# Patient Record
Sex: MALE | Race: BLACK OR AFRICAN AMERICAN | NOT HISPANIC OR LATINO | Employment: FULL TIME | URBAN - METROPOLITAN AREA
[De-identification: names, ages, dates, MRNs, and addresses within clinical notes are randomized per-mention and may not be internally consistent; named-entity substitution may affect disease eponyms.]

---

## 2017-03-29 RX ORDER — GLYBURIDE 5 MG/1
TABLET ORAL
Qty: 180 TABLET | Refills: 0 | Status: SHIPPED | OUTPATIENT
Start: 2017-03-29 | End: 2017-08-23

## 2017-03-29 RX ORDER — METFORMIN HYDROCHLORIDE 1000 MG/1
TABLET ORAL
Qty: 180 TABLET | Refills: 0 | Status: SHIPPED | OUTPATIENT
Start: 2017-03-29 | End: 2017-10-13 | Stop reason: SDUPTHER

## 2017-03-29 NOTE — TELEPHONE ENCOUNTER
Please call pt. He is overdue for follow up visit and lab work for his diabetes. Labs are already ordered ([facility]) - please schedule labs and appt. Refills sent. Thanks!

## 2017-08-23 ENCOUNTER — OFFICE VISIT (OUTPATIENT)
Dept: FAMILY MEDICINE | Facility: CLINIC | Age: 62
End: 2017-08-23
Payer: MEDICARE

## 2017-08-23 ENCOUNTER — HOSPITAL ENCOUNTER (OUTPATIENT)
Dept: RADIOLOGY | Facility: CLINIC | Age: 62
Discharge: HOME | End: 2017-08-23
Attending: NURSE PRACTITIONER
Payer: MEDICARE

## 2017-08-23 VITALS
TEMPERATURE: 99 F | DIASTOLIC BLOOD PRESSURE: 60 MMHG | HEIGHT: 67 IN | SYSTOLIC BLOOD PRESSURE: 148 MMHG | WEIGHT: 248.88 LBS | HEART RATE: 75 BPM | BODY MASS INDEX: 39.06 KG/M2

## 2017-08-23 DIAGNOSIS — M25.511 BILATERAL SHOULDER PAIN, UNSPECIFIED CHRONICITY: Primary | ICD-10-CM

## 2017-08-23 DIAGNOSIS — M25.511 BILATERAL SHOULDER PAIN, UNSPECIFIED CHRONICITY: ICD-10-CM

## 2017-08-23 DIAGNOSIS — I10 UNCONTROLLED HYPERTENSION: ICD-10-CM

## 2017-08-23 DIAGNOSIS — G62.9 NEUROPATHY: ICD-10-CM

## 2017-08-23 DIAGNOSIS — M25.512 BILATERAL SHOULDER PAIN, UNSPECIFIED CHRONICITY: Primary | ICD-10-CM

## 2017-08-23 DIAGNOSIS — M25.512 BILATERAL SHOULDER PAIN, UNSPECIFIED CHRONICITY: ICD-10-CM

## 2017-08-23 PROCEDURE — 3078F DIAST BP <80 MM HG: CPT | Mod: ,,, | Performed by: NURSE PRACTITIONER

## 2017-08-23 PROCEDURE — 99204 OFFICE O/P NEW MOD 45 MIN: CPT | Mod: S$PBB,,, | Performed by: NURSE PRACTITIONER

## 2017-08-23 PROCEDURE — 73030 X-RAY EXAM OF SHOULDER: CPT | Mod: TC,50,PO

## 2017-08-23 PROCEDURE — 99999 PR PBB SHADOW E&M-EST. PATIENT-LVL V: CPT | Mod: PBBFAC,,, | Performed by: NURSE PRACTITIONER

## 2017-08-23 PROCEDURE — 3077F SYST BP >= 140 MM HG: CPT | Mod: ,,, | Performed by: NURSE PRACTITIONER

## 2017-08-23 PROCEDURE — 73030 X-RAY EXAM OF SHOULDER: CPT | Mod: 26,50,S$GLB, | Performed by: RADIOLOGY

## 2017-08-23 RX ORDER — LOVASTATIN 40 MG/1
40 TABLET ORAL NIGHTLY
COMMUNITY
End: 2018-11-14 | Stop reason: SDUPTHER

## 2017-08-23 RX ORDER — POTASSIUM CHLORIDE 750 MG/1
10 TABLET, EXTENDED RELEASE ORAL DAILY
COMMUNITY
End: 2017-09-07 | Stop reason: ALTCHOICE

## 2017-08-23 RX ORDER — NYSTATIN 100000 U/G
CREAM TOPICAL 2 TIMES DAILY PRN
COMMUNITY
End: 2019-01-16

## 2017-08-23 RX ORDER — AMITRIPTYLINE HYDROCHLORIDE 10 MG/1
10 TABLET, FILM COATED ORAL NIGHTLY PRN
COMMUNITY
End: 2019-02-22

## 2017-08-23 RX ORDER — GABAPENTIN 300 MG/1
300 CAPSULE ORAL 2 TIMES DAILY
COMMUNITY
End: 2017-08-23 | Stop reason: DRUGHIGH

## 2017-08-23 RX ORDER — DICLOFENAC SODIUM 10 MG/G
2 GEL TOPICAL 4 TIMES DAILY PRN
Qty: 100 G | Refills: 3 | Status: SHIPPED | OUTPATIENT
Start: 2017-08-23 | End: 2018-11-14

## 2017-08-23 RX ORDER — GABAPENTIN 100 MG/1
100 CAPSULE ORAL 3 TIMES DAILY
Qty: 90 CAPSULE | Refills: 11 | Status: SHIPPED | OUTPATIENT
Start: 2017-08-23 | End: 2019-02-22 | Stop reason: SDUPTHER

## 2017-08-23 RX ORDER — FLUCONAZOLE 150 MG/1
150 TABLET ORAL
COMMUNITY
End: 2019-01-16

## 2017-08-23 RX ORDER — HYDROCORTISONE 25 MG/G
CREAM TOPICAL 2 TIMES DAILY
COMMUNITY
End: 2018-11-14

## 2017-08-23 RX ORDER — FUROSEMIDE 40 MG/1
40 TABLET ORAL DAILY
COMMUNITY
End: 2017-09-07 | Stop reason: ALTCHOICE

## 2017-08-23 NOTE — PROGRESS NOTES
"Subjective:       Patient ID: Kilo Lam Sr. is a 61 y.o. male.    Chief Complaint: Shoulder Pain (right more then left)    Mr. Lam presents to the clinic today for complaint of bilateral shoulder pain which began one month ago.  Pain began in the right shoulder, and after favoring the left shoulder for weeks he began having left shoulder pain as well.  He has history of left shoulder surgery although he is unsure exactly what type.  He does have bilateral hand numbness and states this is chronic due to diabetic neuropathy.  He took gabapentin 300 mg but this caused too much drowsiness to he stopped.  Blood pressure is uncontrolled and he states he has had high blood pressure since age 15.  He states it is "always high".  Denies ever seeing a nephrologist.      Shoulder Pain    The pain is present in the left shoulder and right shoulder. This is a new problem. The current episode started 1 to 4 weeks ago. There has been no history of extremity trauma. The problem occurs daily. The problem has been gradually worsening. The pain is at a severity of 8/10. Associated symptoms include a limited range of motion and numbness (both hands and legs (states due to diabetic neuropathy)). Pertinent negatives include no fever, headaches, inability to bear weight, joint swelling, stiffness, tingling or visual symptoms. He has tried heat, cold and acetaminophen for the symptoms. The treatment provided no relief. His past medical history is significant for diabetes.     Review of Systems   Constitutional: Negative for fever.   Respiratory: Negative for cough and shortness of breath.    Cardiovascular: Negative for chest pain.   Musculoskeletal: Positive for arthralgias and neck pain. Negative for back pain, joint swelling, neck stiffness and stiffness.   Neurological: Positive for numbness (both hands and legs (states due to diabetic neuropathy)). Negative for tingling, weakness and headaches.       Objective:      Physical " Exam   Constitutional: He is oriented to person, place, and time. He appears well-developed and well-nourished. No distress.   HENT:   Head: Normocephalic and atraumatic.   Right Ear: External ear normal.   Left Ear: External ear normal.   Mouth/Throat: Oropharynx is clear and moist. No oropharyngeal exudate.   Eyes: Pupils are equal, round, and reactive to light. Right eye exhibits no discharge. Left eye exhibits no discharge.   Neck: Neck supple.   Cardiovascular: Normal rate.    Pulmonary/Chest: Effort normal.   Abdominal: Soft.   Musculoskeletal:        Right shoulder: He exhibits decreased range of motion and tenderness. He exhibits no bony tenderness, no swelling, no crepitus and normal strength.        Left shoulder: He exhibits tenderness. He exhibits normal range of motion, no bony tenderness and no swelling.   TTP bilateral subacromial   Neurological: He is alert and oriented to person, place, and time. Coordination normal.   Skin: Skin is warm and dry.   Psychiatric: He has a normal mood and affect. His behavior is normal. Thought content normal.   Vitals reviewed.          Current Outpatient Prescriptions:     alprazolam (XANAX) 0.5 MG tablet, Take 0.5 mg by mouth daily as needed. , Disp: , Rfl:     amitriptyline (ELAVIL) 10 MG tablet, Take 10 mg by mouth nightly as needed for Insomnia., Disp: , Rfl:     amlodipine (NORVASC) 10 MG tablet, TAKE 1 TABLET ONE TIME DAILY, Disp: 90 tablet, Rfl: 3    aspirin (ECOTRIN) 81 MG EC tablet, Take 81 mg by mouth once daily., Disp: , Rfl:     fluconazole (DIFLUCAN) 150 MG Tab, Take 150 mg by mouth once daily., Disp: , Rfl:     furosemide (LASIX) 40 MG tablet, Take 40 mg by mouth once daily., Disp: , Rfl:     hydrocortisone 2.5 % cream, Apply topically 2 (two) times daily., Disp: , Rfl:     lisinopril (PRINIVIL,ZESTRIL) 40 MG tablet, TAKE 1 TABLET EVERY DAY, Disp: 90 tablet, Rfl: 3    lovastatin (MEVACOR) 40 MG tablet, Take 40 mg by mouth every evening., Disp:  , Rfl:     metformin (GLUCOPHAGE) 1000 MG tablet, TAKE 1 TABLET TWICE DAILY WITH MEALS, Disp: 180 tablet, Rfl: 0    nystatin (MYCOSTATIN) cream, Apply topically 2 (two) times daily., Disp: , Rfl:     potassium chloride (KLOR-CON) 10 MEQ TbSR, Take 10 mEq by mouth once daily., Disp: , Rfl:     spironolactone (ALDACTONE) 25 MG tablet, Take 1 tablet (25 mg total) by mouth once daily., Disp: 30 tablet, Rfl: 11    triamcinolone acetonide 0.1% (KENALOG) 0.1 % cream, , Disp: , Rfl:     diclofenac sodium 1 % Gel, Apply 2 g topically 4 (four) times daily as needed., Disp: 100 g, Rfl: 3    gabapentin (NEURONTIN) 100 MG capsule, Take 1 capsule (100 mg total) by mouth 3 (three) times daily., Disp: 90 capsule, Rfl: 11  Assessment:       1. Bilateral shoulder pain, unspecified chronicity    2. Uncontrolled hypertension    3. Neuropathy        Plan:       Bilateral shoulder pain, unspecified chronicity  Ortho referral if no improvement in 4-6 weeks.  -     diclofenac sodium 1 % Gel; Apply 2 g topically 4 (four) times daily as needed.  Dispense: 100 g; Refill: 3  -     X-Ray Shoulder 2 or More views Bilateral; Future; Expected date: 08/23/2017  -     Ambulatory Referral to Physical/Occupational Therapy    Uncontrolled hypertension  -     Ambulatory referral to Nephrology    Neuropathy  Decrease dosage.  -     gabapentin (NEURONTIN) 100 MG capsule; Take 1 capsule (100 mg total) by mouth 3 (three) times daily.  Dispense: 90 capsule; Refill: 11

## 2017-08-24 ENCOUNTER — TELEPHONE (OUTPATIENT)
Dept: FAMILY MEDICINE | Facility: CLINIC | Age: 62
End: 2017-08-24

## 2017-08-24 NOTE — TELEPHONE ENCOUNTER
----- Message from Lisbeth Pastrana sent at 8/24/2017  2:59 PM CDT -----  Contact: self [phone]  He is calling to follow up on the order to [facility].  They have not received it as yet.  Their phone number is [phone], he does not have the fax nu,jaleel. Please send it.  Thank you!

## 2017-09-07 ENCOUNTER — OFFICE VISIT (OUTPATIENT)
Dept: NEPHROLOGY | Facility: CLINIC | Age: 62
End: 2017-09-07
Payer: MEDICARE

## 2017-09-07 VITALS
HEART RATE: 70 BPM | OXYGEN SATURATION: 97 % | DIASTOLIC BLOOD PRESSURE: 72 MMHG | WEIGHT: 249.81 LBS | SYSTOLIC BLOOD PRESSURE: 156 MMHG | BODY MASS INDEX: 39.12 KG/M2

## 2017-09-07 DIAGNOSIS — I10 ESSENTIAL HYPERTENSION: ICD-10-CM

## 2017-09-07 PROCEDURE — 3078F DIAST BP <80 MM HG: CPT | Mod: ,,, | Performed by: INTERNAL MEDICINE

## 2017-09-07 PROCEDURE — 99204 OFFICE O/P NEW MOD 45 MIN: CPT | Mod: S$PBB,,, | Performed by: INTERNAL MEDICINE

## 2017-09-07 PROCEDURE — 3077F SYST BP >= 140 MM HG: CPT | Mod: ,,, | Performed by: INTERNAL MEDICINE

## 2017-09-07 PROCEDURE — 99999 PR PBB SHADOW E&M-EST. PATIENT-LVL III: CPT | Mod: PBBFAC,,, | Performed by: INTERNAL MEDICINE

## 2017-09-07 PROCEDURE — 99213 OFFICE O/P EST LOW 20 MIN: CPT | Mod: PBBFAC,PO | Performed by: INTERNAL MEDICINE

## 2017-09-07 RX ORDER — TRIAMTERENE AND HYDROCHLOROTHIAZIDE 37.5; 25 MG/1; MG/1
1 CAPSULE ORAL
COMMUNITY
End: 2017-09-07

## 2017-09-07 RX ORDER — SPIRONOLACTONE 50 MG/1
50 TABLET, FILM COATED ORAL DAILY
Qty: 30 TABLET | Refills: 6 | Status: SHIPPED | OUTPATIENT
Start: 2017-09-07 | End: 2018-11-14

## 2017-09-07 RX ORDER — GLYBURIDE 5 MG/1
5 TABLET ORAL
COMMUNITY
End: 2017-10-13 | Stop reason: SDUPTHER

## 2017-09-07 RX ORDER — GABAPENTIN 300 MG/1
300 CAPSULE ORAL
COMMUNITY
End: 2017-09-07

## 2017-09-07 RX ORDER — CETIRIZINE HYDROCHLORIDE 10 MG/1
10 TABLET ORAL
COMMUNITY
End: 2017-09-07

## 2017-09-07 RX ORDER — METHOCARBAMOL 500 MG/1
500 TABLET, FILM COATED ORAL DAILY
COMMUNITY
End: 2017-09-07

## 2017-09-07 RX ORDER — LISINOPRIL 40 MG/1
40 TABLET ORAL
COMMUNITY
End: 2017-09-07

## 2017-09-07 RX ORDER — AMLODIPINE BESYLATE 10 MG/1
10 TABLET ORAL
COMMUNITY
End: 2017-09-07

## 2017-09-07 RX ORDER — METFORMIN HYDROCHLORIDE 1000 MG/1
1000 TABLET ORAL
COMMUNITY
End: 2017-09-07

## 2017-09-07 NOTE — PROGRESS NOTES
Subjective:       Patient ID: Kilo Lam Sr. is a 61 y.o. Black or  male who presents for new patient evaluation for hypertension.    Kilo Lam Sr. is referred by Tonya Murray MD to be evaluated for hypertension.  He has had hypertension since age 15 and has had diabetes for about 23 years.  He is on amlodipine 10 mg, furosemide 40 mg and lisinopril 40 mg.  He also is taking spironolactone 25 mg.  He reports he does not like taking the lasix as it causes frequency and nocturia.      Review of Systems   Constitutional: Negative for appetite change, chills and fever.   Eyes: Negative for visual disturbance.   Respiratory: Negative for cough and shortness of breath.    Cardiovascular: Positive for leg swelling. Negative for chest pain.   Gastrointestinal: Negative for diarrhea, nausea and vomiting.   Genitourinary: Positive for frequency. Negative for difficulty urinating, dysuria and hematuria.   Musculoskeletal: Negative for myalgias.   Skin: Negative for rash.   Neurological: Negative for headaches.   Psychiatric/Behavioral: Negative for sleep disturbance.       The past medical, family and social histories were reviewed for this encounter.     Past Medical History:   Diagnosis Date    Anxiety     Depression     Diabetes mellitus type II     Fatty liver     HTN (hypertension)      Past Surgical History:   Procedure Laterality Date    CARPAL TUNNEL RELEASE Left 11/2011    no relief of hand symptoms    COLONOSCOPY  2004    normal, repeat in 7 yrs    INCISIONAL HERNIA REPAIR  12/2012    inguinal    SHOULDER SURGERY Left      Social History     Social History    Marital status:      Spouse name: N/A    Number of children: N/A    Years of education: N/A     Occupational History    Not on file.     Social History Main Topics    Smoking status: Former Smoker     Quit date: 1/28/2009    Smokeless tobacco: Never Used    Alcohol use No    Drug use: No    Sexual  activity: Not on file     Other Topics Concern    Not on file     Social History Narrative        Retired .     Current Outpatient Prescriptions   Medication Sig    alprazolam (XANAX) 0.5 MG tablet Take 0.5 mg by mouth daily as needed.     amitriptyline (ELAVIL) 10 MG tablet Take 10 mg by mouth nightly as needed for Insomnia.    amlodipine (NORVASC) 10 MG tablet TAKE 1 TABLET ONE TIME DAILY    aspirin (ECOTRIN) 81 MG EC tablet Take 81 mg by mouth once daily.    glyBURIDE (DIABETA) 5 MG tablet Take 5 mg by mouth daily with breakfast.     lisinopril (PRINIVIL,ZESTRIL) 40 MG tablet TAKE 1 TABLET EVERY DAY    lovastatin (MEVACOR) 40 MG tablet Take 40 mg by mouth every evening.    metformin (GLUCOPHAGE) 1000 MG tablet TAKE 1 TABLET TWICE DAILY WITH MEALS    spironolactone (ALDACTONE) 50 MG tablet Take 1 tablet (50 mg total) by mouth once daily.    diclofenac sodium 1 % Gel Apply 2 g topically 4 (four) times daily as needed.    fluconazole (DIFLUCAN) 150 MG Tab Take 150 mg by mouth as needed.     gabapentin (NEURONTIN) 100 MG capsule Take 1 capsule (100 mg total) by mouth 3 (three) times daily.    hydrocortisone 2.5 % cream Apply topically 2 (two) times daily.    nystatin (MYCOSTATIN) cream Apply topically 2 (two) times daily as needed.     triamcinolone acetonide 0.1% (KENALOG) 0.1 % cream      No current facility-administered medications for this visit.        BP (!) 156/72   Pulse 70   Wt 113.3 kg (249 lb 12.5 oz)   SpO2 97%   BMI 39.12 kg/m²     Objective:      Physical Exam   Constitutional: He is oriented to person, place, and time. He appears well-developed and well-nourished. No distress.   HENT:   Head: Normocephalic and atraumatic.   Eyes: Conjunctivae are normal.   Neck: Neck supple. No JVD present.   Cardiovascular: Normal rate, regular rhythm and normal heart sounds.  Exam reveals no gallop and no friction rub.    No murmur heard.  Pulmonary/Chest: Effort normal and breath  sounds normal. No respiratory distress. He has no wheezes. He has no rales.   Abdominal: Soft. Bowel sounds are normal. He exhibits no distension (obese). There is no tenderness.   Musculoskeletal: He exhibits no edema.   Neurological: He is alert and oriented to person, place, and time.   Skin: Skin is warm and dry. No rash noted.   Psychiatric: He has a normal mood and affect.   Vitals reviewed.      Assessment:       1. Essential hypertension        Plan:   Return to clinic in 1 year.  Stop Kcl and lasix.  Increase aldactone to 50 mg daily.  He will contact me in 1 to 2 weeks with his blood pressure control.  Please have patient follow-up with your PCP or Endocrinology regarding the control and management of diabetes.

## 2017-09-07 NOTE — LETTER
September 7, 2017      [facility]  [address] [facility] [address]           [facility]  [address] [facility] [address] [place] [address]  Phone: [phone]          Patient: Kilo Lam Sr.   MR Number: [id number]   YOB: 1955   Date of Visit: 9/7/2017       Dear [facility]ris Hutson:    Thank you for referring Kilo Lam to me for evaluation. Attached you will find relevant portions of my assessment and plan of care.    If you have questions, please do not hesitate to call me. I look forward to following Kilo Lam along with you.    Sincerely,    Esau Cowan MD    Enclosure  CC:  No Recipients    If you would like to receive this communication electronically, please contact [email or web address] or [phone] to request more information on [employer] Link access.    For providers and/or their staff who would like to refer a patient to [facility], please contact us through our one-stop-shop provider referral line, [facility], at [phone].    If you feel you have received this communication in error or would no longer like to receive these types of communications, please e-mail [email or web address]

## 2017-10-10 ENCOUNTER — PATIENT OUTREACH (OUTPATIENT)
Dept: ADMINISTRATIVE | Facility: HOSPITAL | Age: 62
End: 2017-10-10

## 2017-10-10 DIAGNOSIS — Z13.5 SCREENING FOR DIABETIC RETINOPATHY: Primary | ICD-10-CM

## 2017-10-10 NOTE — PROGRESS NOTES
"Spoke with patient RE:scheduling physical w/Dr. Murray. He stated that he has nothing against Dr. Murray, but he is "old fashioned" and would feel more comfortable with a male PCP. I scheduled him to see Dr. Richardson and explained to him that I want him to be seen so we can make sure his DM & HTN are addressed and that Dr. Richardson can assist him in choosing an appropriate PCP to establish care with.   "

## 2017-10-13 RX ORDER — GLYBURIDE 5 MG/1
TABLET ORAL
Qty: 180 TABLET | Refills: 0 | Status: SHIPPED | OUTPATIENT
Start: 2017-10-13 | End: 2018-01-24 | Stop reason: SDUPTHER

## 2017-10-13 RX ORDER — METFORMIN HYDROCHLORIDE 1000 MG/1
TABLET ORAL
Qty: 180 TABLET | Refills: 0 | Status: SHIPPED | OUTPATIENT
Start: 2017-10-13 | End: 2018-11-14 | Stop reason: SDUPTHER

## 2018-01-24 RX ORDER — GLYBURIDE 5 MG/1
TABLET ORAL
Qty: 180 TABLET | Refills: 0 | Status: SHIPPED | OUTPATIENT
Start: 2018-01-24 | End: 2018-11-14 | Stop reason: SDUPTHER

## 2018-03-28 DIAGNOSIS — E11.9 TYPE 2 DIABETES MELLITUS WITHOUT COMPLICATION: ICD-10-CM

## 2018-04-09 ENCOUNTER — TELEPHONE (OUTPATIENT)
Dept: FAMILY MEDICINE | Facility: CLINIC | Age: 63
End: 2018-04-09

## 2018-04-09 NOTE — TELEPHONE ENCOUNTER
Not PT continuation request or note received. Called [facility] and was informed that the therapist had not finished her note yet and the request would be faxed when complete. Left voicemail to inform patient of status.

## 2018-04-09 NOTE — TELEPHONE ENCOUNTER
----- Message from Bipin José sent at 4/9/2018  1:11 PM CDT -----  Contact: same  Patient called in and wanted to see if [facility] would put in new orders for his physical therapy so he can continue.  Patient stated he uses [facility].  Patient stated [facility] was supposed to be faxing over request this morning.  Patient call back number is [phone]

## 2018-04-20 DIAGNOSIS — E11.9 TYPE 2 DIABETES MELLITUS WITHOUT COMPLICATION: ICD-10-CM

## 2018-06-25 ENCOUNTER — PATIENT OUTREACH (OUTPATIENT)
Dept: ADMINISTRATIVE | Facility: HOSPITAL | Age: 63
End: 2018-06-25

## 2018-06-25 NOTE — PROGRESS NOTES
Pt due for annual PCP appointment and labs.  Contacted pt and attempted to make an appointment with Dr. Murray.  Pt stated he would prefer a male provider.  Informed him I would have the appointment team return call to set appointment with a male provider.  Patient verbalized understanding.

## 2018-06-26 ENCOUNTER — DOCUMENTATION ONLY (OUTPATIENT)
Dept: ADMINISTRATIVE | Facility: HOSPITAL | Age: 63
End: 2018-06-26

## 2018-06-26 NOTE — PROGRESS NOTES
Pt due for annual PCP appointment and labs.  Contacted pt and attempted to make an appointment with Dr. Murray.  Pt stated he would prefer a male provider.  Informed him I would have the appointment team return call to set appointment with a male provider.  Patient verbalized understanding.     Will notify appointment team.

## 2018-11-13 NOTE — PROGRESS NOTES
"ANNUAL VISIT NOTE     PRESENTING HISTORY     Reason for Visit:  Annual visit.    No chief complaint on file.      History of Present Illness & ROS: Mr. Kilo Lam Sr. is a 62 y.o. male.  Mr. Lam is here today for Preventative Healthcare and to establish with a new Provider.   Presents today with request to have Medications refilled. States, "have been doctor / clinic hopping over the past 2 years; got depressed about some things, including the death of my daughter from leukemia". He does not endorse any SI or HI.   He has been prescribed medications from different clinics for mgt of his blood pressure and diabetes. He also reports that stopped coming to [facility] due to a non verbalized reason that occurred in the dept of Psychology. He presents today "because I need to take better care of myself if I want to see my grandkids grow up".       Review of Systems:  Eyes: denies visual changes at this time denies floaters   ENT: no nasal congestion or sore throat  Respiratory: no cough or shorness of breath  Cardiovascular: no chest pain or palpitations  Gastrointestinal: no nausea or vomiting, no abdominal pain or change in bowel habits  Genitourinary: no hematuria or dysuria; denies frequency  Hematologic/Lymphatic: no easy bruising or lymphadenopathy  Musculoskeletal: no arthralgias or myalgias  Neurological: no seizures or tremors  Endocrine: no heat or cold intolerance    PAST HISTORY:     Past Medical History:   Diagnosis Date    Anxiety     Carpal tunnel syndrome, bilateral 4/9/2013    S/p Lt Carpal tunnel release 11/2011     Depression     Diabetes mellitus type II     Fatty liver     HTN (hypertension)        Past Surgical History:   Procedure Laterality Date    CARPAL TUNNEL RELEASE Left 11/2011    no relief of hand symptoms    COLONOSCOPY  2004    normal, repeat in 7 yrs    INCISIONAL HERNIA REPAIR  12/2012    inguinal    REPAIR, HERNIA, INGUINAL, WITHOUT HISTORY OF PRIOR REPAIR, AGE 3 YEARS OR " OLDER Left 2012    Performed by Martinez Jones Jr., [facility] OR [facility]    SHOULDER SURGERY Left        Family History   Problem Relation Age of Onset    Diabetes Mother     Diabetes Father     Heart failure Father     Cancer Daughter 14        Leukemia, passed    Cancer Other         niece, Lung cancer    Liver disease Neg Hx     Colon cancer Neg Hx        Social History     Socioeconomic History    Marital status:      Spouse name: Not on file    Number of children: Not on file    Years of education: Not on file    Highest education level: Not on file   Social Needs    Financial resource strain: Not on file    Food insecurity - worry: Not on file    Food insecurity - inability: Not on file    Transportation needs - medical: Not on file    Transportation needs - non-medical: Not on file   Occupational History    Not on file   Tobacco Use    Smoking status: Former Smoker     Last attempt to quit: 2009     Years since quittin.7    Smokeless tobacco: Never Used   Substance and Sexual Activity    Alcohol use: No    Drug use: No    Sexual activity: Not on file   Other Topics Concern    Not on file   Social History Narrative        Retired .       MEDICATIONS & ALLERGIES:     Current Outpatient Medications on File Prior to Visit   Medication Sig Dispense Refill    alprazolam (XANAX) 0.5 MG tablet Take 0.5 mg by mouth daily as needed.       amitriptyline (ELAVIL) 10 MG tablet Take 10 mg by mouth nightly as needed for Insomnia.      amlodipine (NORVASC) 10 MG tablet TAKE 1 TABLET ONE TIME DAILY 90 tablet 3    aspirin (ECOTRIN) 81 MG EC tablet Take 81 mg by mouth once daily.      diclofenac sodium 1 % Gel Apply 2 g topically 4 (four) times daily as needed. 100 g 3    fluconazole (DIFLUCAN) 150 MG Tab Take 150 mg by mouth as needed.       gabapentin (NEURONTIN) 100 MG capsule Take 1 capsule (100 mg total) by mouth 3 (three) times daily. 90 capsule 11     glyBURIDE (DIABETA) 5 MG tablet TAKE 1 TABLET TWICE DAILY WITH MEALS 180 tablet 0    hydrocortisone 2.5 % cream Apply topically 2 (two) times daily.      lisinopril (PRINIVIL,ZESTRIL) 40 MG tablet TAKE 1 TABLET EVERY DAY 90 tablet 3    lovastatin (MEVACOR) 40 MG tablet Take 40 mg by mouth every evening.      metformin (GLUCOPHAGE) 1000 MG tablet TAKE 1 TABLET TWICE DAILY WITH MEALS 180 tablet 0    nystatin (MYCOSTATIN) cream Apply topically 2 (two) times daily as needed.       spironolactone (ALDACTONE) 50 MG tablet Take 1 tablet (50 mg total) by mouth once daily. 30 tablet 6    triamcinolone acetonide 0.1% (KENALOG) 0.1 % cream        No current facility-administered medications on file prior to visit.         Review of patient's allergies indicates:   Allergen Reactions    No known drug allergies        Medications Reconciliation:   I have reconciled the patient's home medications and discharge medications with the patient/family. I have updated all changes.  Refer to After-Visit Medication List.    OBJECTIVE:     Vital Signs:  There were no vitals filed for this visit.  Wt Readings from Last 1 Encounters:   09/07/17 1429 113.3 kg (249 lb 12.5 oz)     There is no height or weight on file to calculate BMI.     Wt Readings from Last 3 Encounters:   11/14/18 112.1 kg (247 lb 2.2 oz)   09/07/17 113.3 kg (249 lb 12.5 oz)   08/23/17 112.9 kg (248 lb 14.4 oz)     Temp Readings from Last 3 Encounters:   08/23/17 98.8 °F (37.1 °C) (Oral)   02/01/13 98.7 °F (37.1 °C)   01/28/13 98.9 °F (37.2 °C)     BP Readings from Last 3 Encounters:   11/14/18 (!) 160/72   09/07/17 (!) 156/72   08/23/17 (!) 148/60     Pulse Readings from Last 3 Encounters:   11/14/18 67   09/07/17 70   08/23/17 75         Physical Exam:  General: Well developed, well nourished. No distress.  HEENT: Head is normocephalic, atraumatic; ears are normal.   Eyes: Clear conjunctiva.  Neck: Supple, symmetrical neck; trachea midline.  Lungs: Clear to  auscultation bilaterally and normal respiratory effort.  Cardiovascular: Heart with regular rate and rhythm. No murmurs, gallops or rubs  Extremities: No LE edema. Pulses 2+ and symmetric.   Abdomen: Abdomen is soft, non-tender non-distended with normal bowel sounds.  Skin: Skin color, texture, turgor normal. No rashes.  Musculoskeletal: Normal gait.   Lymph Nodes: No cervical or supraclavicular adenopathy.  Neurologic: Normal strength and tone. No focal numbness or weakness.   Psychiatric: Not depressed.      Laboratory  Lab Results   Component Value Date    WBC 7.07 12/04/2014    HGB 13.3 (L) 12/04/2014    HCT 41.9 12/04/2014     12/04/2014    CHOL 223 (H) 01/28/2016    TRIG 162 (H) 01/28/2016    HDL 61 01/28/2016    ALT 21 01/28/2016    AST 17 01/28/2016     08/02/2016    K 4.5 08/02/2016     08/02/2016    CREATININE 1.1 08/02/2016    BUN 17 08/02/2016    CO2 27 08/02/2016    TSH 1.329 12/04/2014    PSA 0.45 05/29/2015    INR 0.9 01/30/2013    HGBA1C 11.2 (H) 06/11/2016       ASSESSMENT & PLAN:       Preventative Healthcare / Wellness, Annual Visit:   Labs:   A1c  CMP  CBC  Lipid Panel  PSA    Screenings:   C Scope (scheduled)        Diabetes Mellitus (II):   (? Why not on insulin therapy for mgt in the setting of chronically uncontrolled DM)  A1C: 2016: 11.2%, reflective of POOR OP control   Home Range: (not checking)   ` continue Diabeta 5/5  ` continue Metformin 1000/1000  ` amb referral to Podiatry  ` amb referral to Optometry  ` amb referral to Endocrine  - message has been sent to YANDY Ulloa RN, [place] to schedule patient an apt with her.   *On ACE I therapy  (refills sent)  *On Statin therapy   *Concern: taking HCTZ (prescribed by an outside clinic provider) + Aldactone + max dose Lisinopril = check renal markers today (seen by Dr. MARA Cowan in 2016, with recs to follow up in 1-2 weeks from the visit, but pt failed to follow up); will reconsider when labs have been received.          Hypertension, Essential:   *Goal :< 130/90  Today: 160/72 (uncontrolled)  ` continue Norvasc (refills sent)  ` continue Lisinopril (refills sent)  ` Aldactone 50 (no refills; awaiting labs)  ` HCTZ 50 (received at an outside clinic)        Hyperlipidemia:   Lab Results   Component Value Date    CHOL 223 (H) 01/28/2016    CHOL 217 (H) 12/04/2014    CHOL 190 09/15/2011     Lab Results   Component Value Date    HDL 61 01/28/2016    HDL 55 12/04/2014    HDL 53 09/15/2011     Lab Results   Component Value Date    LDLCALC 129.6 01/28/2016    LDLCALC 126.2 12/04/2014    LDLCALC 114.6 09/15/2011     Lab Results   Component Value Date    TRIG 162 (H) 01/28/2016    TRIG 179 (H) 12/04/2014    TRIG 112 09/15/2011     Lab Results   Component Value Date    CHOLHDL 27.4 01/28/2016    CHOLHDL 25.3 12/04/2014    CHOLHDL 27.9 09/15/2011   - continue Mevacor therapy  - check Lipid Profile panel today         Depressive Disorder:   ` amb refer to Psych (patient provided with the number and is amendable to the plan)  *No SI or HI      Immunizations:  TD: today  Flu Vaccine: today     *Suggest follow up with PCP (Dr. BURAK Fortune) in 3 months. Sooner if indicated.        Medication List           Accurate as of 11/14/18 10:41 AM. If you have any questions, ask your nurse or doctor.               START taking these medications    hydroCHLOROthiazide 25 MG tablet  Commonly known as:  HYDRODIURIL  Take 2 tablets (50 mg total) by mouth once daily.  Started by:  ARABELLA Michaels     meloxicam 15 MG tablet  Commonly known as:  MOBIC  Take 1 tablet (15 mg total) by mouth once daily.  Started by:  ARABELLA Michaels        CHANGE how you take these medications    amLODIPine 10 MG tablet  Commonly known as:  NORVASC  Take 1 tablet (10 mg total) by mouth once daily.  What changed:  See the new instructions.  Changed by:  ARABELLA Michaels        CONTINUE taking these medications    ALPRAZolam 0.5 MG tablet  Commonly  known as:  XANAX     amitriptyline 10 MG tablet  Commonly known as:  ELAVIL     aspirin 81 MG EC tablet  Commonly known as:  ECOTRIN     fluconazole 150 MG Tab  Commonly known as:  DIFLUCAN     gabapentin 100 MG capsule  Commonly known as:  NEURONTIN  Take 1 capsule (100 mg total) by mouth 3 (three) times daily.     glyBURIDE 5 MG tablet  Commonly known as:  DIABETA  Take 1 tablet (5 mg total) by mouth 2 (two) times daily with meals.     lisinopril 40 MG tablet  Commonly known as:  PRINIVIL,ZESTRIL  Take 1 tablet (40 mg total) by mouth once daily.     lovastatin 40 MG tablet  Commonly known as:  MEVACOR  Take 1 tablet (40 mg total) by mouth every evening.     metFORMIN 1000 MG tablet  Commonly known as:  GLUCOPHAGE  Take 1 tablet (1,000 mg total) by mouth 2 (two) times daily with meals.     nystatin cream  Commonly known as:  MYCOSTATIN     spironolactone 50 MG tablet  Commonly known as:  ALDACTONE  Take 1 tablet (50 mg total) by mouth once daily.     triamcinolone acetonide 0.1% 0.1 % cream  Commonly known as:  KENALOG        STOP taking these medications    diclofenac sodium 1 % Gel  Commonly known as:  VOLTAREN  Stopped by:  ARABELLA Michaels     hydrocortisone 2.5 % cream  Stopped by:  ARABELLA Michaels           Where to Get Your Medications      These medications were sent to [facility] [address] [place] [id number]    Phone:  [phone]   · amLODIPine 10 MG tablet  · glyBURIDE 5 MG tablet  · lisinopril 40 MG tablet  · lovastatin 40 MG tablet  · metFORMIN 1000 MG tablet     Information about where to get these medications is not yet available    Ask your nurse or doctor about these medications  · hydroCHLOROthiazide 25 MG tablet  · meloxicam 15 MG tablet         Signing Physician:  ARABELLA Michaels

## 2018-11-14 ENCOUNTER — LAB VISIT (OUTPATIENT)
Dept: LAB | Facility: HOSPITAL | Age: 63
End: 2018-11-14
Payer: MEDICARE

## 2018-11-14 ENCOUNTER — IMMUNIZATION (OUTPATIENT)
Dept: INTERNAL MEDICINE | Facility: CLINIC | Age: 63
End: 2018-11-14
Payer: MEDICARE

## 2018-11-14 ENCOUNTER — OFFICE VISIT (OUTPATIENT)
Dept: INTERNAL MEDICINE | Facility: CLINIC | Age: 63
End: 2018-11-14
Payer: MEDICARE

## 2018-11-14 ENCOUNTER — TELEPHONE (OUTPATIENT)
Dept: INTERNAL MEDICINE | Facility: CLINIC | Age: 63
End: 2018-11-14

## 2018-11-14 VITALS
HEIGHT: 67 IN | BODY MASS INDEX: 38.79 KG/M2 | WEIGHT: 247.13 LBS | OXYGEN SATURATION: 98 % | HEART RATE: 67 BPM | SYSTOLIC BLOOD PRESSURE: 160 MMHG | DIASTOLIC BLOOD PRESSURE: 72 MMHG

## 2018-11-14 DIAGNOSIS — I10 ESSENTIAL HYPERTENSION: ICD-10-CM

## 2018-11-14 DIAGNOSIS — E78.5 HYPERLIPIDEMIA, UNSPECIFIED HYPERLIPIDEMIA TYPE: ICD-10-CM

## 2018-11-14 DIAGNOSIS — F32.A DEPRESSION, UNSPECIFIED DEPRESSION TYPE: ICD-10-CM

## 2018-11-14 DIAGNOSIS — G62.9 NEUROPATHY: ICD-10-CM

## 2018-11-14 LAB
ALBUMIN SERPL BCP-MCNC: 3.8 G/DL
ALP SERPL-CCNC: 53 U/L
ALT SERPL W/O P-5'-P-CCNC: 24 U/L
ANION GAP SERPL CALC-SCNC: 9 MMOL/L
AST SERPL-CCNC: 22 U/L
BASOPHILS # BLD AUTO: 0.02 K/UL
BASOPHILS NFR BLD: 0.2 %
BILIRUB SERPL-MCNC: 0.4 MG/DL
BUN SERPL-MCNC: 14 MG/DL
CALCIUM SERPL-MCNC: 10 MG/DL
CHLORIDE SERPL-SCNC: 102 MMOL/L
CHOLEST SERPL-MCNC: 156 MG/DL
CHOLEST/HDLC SERPL: 3.5 {RATIO}
CO2 SERPL-SCNC: 28 MMOL/L
CREAT SERPL-MCNC: 0.9 MG/DL
DIFFERENTIAL METHOD: ABNORMAL
EOSINOPHIL # BLD AUTO: 0.2 K/UL
EOSINOPHIL NFR BLD: 2.8 %
ERYTHROCYTE [DISTWIDTH] IN BLOOD BY AUTOMATED COUNT: 16.8 %
EST. GFR  (AFRICAN AMERICAN): >60 ML/MIN/1.73 M^2
EST. GFR  (NON AFRICAN AMERICAN): >60 ML/MIN/1.73 M^2
ESTIMATED AVG GLUCOSE: 295 MG/DL
GLUCOSE SERPL-MCNC: 231 MG/DL
HBA1C MFR BLD HPLC: 11.9 %
HCT VFR BLD AUTO: 42.1 %
HDLC SERPL-MCNC: 45 MG/DL
HDLC SERPL: 28.8 %
HGB BLD-MCNC: 12.7 G/DL
LDLC SERPL CALC-MCNC: 75.4 MG/DL
LYMPHOCYTES # BLD AUTO: 2.5 K/UL
LYMPHOCYTES NFR BLD: 30.4 %
MCH RBC QN AUTO: 21.5 PG
MCHC RBC AUTO-ENTMCNC: 30.2 G/DL
MCV RBC AUTO: 71 FL
MONOCYTES # BLD AUTO: 0.6 K/UL
MONOCYTES NFR BLD: 7.4 %
NEUTROPHILS # BLD AUTO: 4.8 K/UL
NEUTROPHILS NFR BLD: 58.8 %
NONHDLC SERPL-MCNC: 111 MG/DL
NRBC BLD-RTO: 0 /100 WBC
PLATELET # BLD AUTO: 257 K/UL
PMV BLD AUTO: 11.8 FL
POTASSIUM SERPL-SCNC: 4.5 MMOL/L
PROT SERPL-MCNC: 7.3 G/DL
RBC # BLD AUTO: 5.91 M/UL
SODIUM SERPL-SCNC: 139 MMOL/L
TRIGL SERPL-MCNC: 178 MG/DL
WBC # BLD AUTO: 8.1 K/UL

## 2018-11-14 PROCEDURE — 80061 LIPID PANEL: CPT

## 2018-11-14 PROCEDURE — 85025 COMPLETE CBC W/AUTO DIFF WBC: CPT

## 2018-11-14 PROCEDURE — 99214 OFFICE O/P EST MOD 30 MIN: CPT | Mod: S$PBB,,, | Performed by: NURSE PRACTITIONER

## 2018-11-14 PROCEDURE — 83036 HEMOGLOBIN GLYCOSYLATED A1C: CPT

## 2018-11-14 PROCEDURE — 99999 PR PBB SHADOW E&M-EST. PATIENT-LVL V: CPT | Mod: PBBFAC,,, | Performed by: NURSE PRACTITIONER

## 2018-11-14 PROCEDURE — 90686 IIV4 VACC NO PRSV 0.5 ML IM: CPT | Mod: PBBFAC

## 2018-11-14 PROCEDURE — 36415 COLL VENOUS BLD VENIPUNCTURE: CPT

## 2018-11-14 PROCEDURE — 99215 OFFICE O/P EST HI 40 MIN: CPT | Mod: PBBFAC | Performed by: NURSE PRACTITIONER

## 2018-11-14 PROCEDURE — 80053 COMPREHEN METABOLIC PANEL: CPT

## 2018-11-14 RX ORDER — METFORMIN HYDROCHLORIDE 1000 MG/1
1000 TABLET ORAL 2 TIMES DAILY WITH MEALS
Qty: 180 TABLET | Refills: 3 | Status: SHIPPED | OUTPATIENT
Start: 2018-11-14 | End: 2019-06-14 | Stop reason: SDUPTHER

## 2018-11-14 RX ORDER — MELOXICAM 15 MG/1
15 TABLET ORAL DAILY
Qty: 30 TABLET | Refills: 0
Start: 2018-11-14 | End: 2019-02-22

## 2018-11-14 RX ORDER — HYDROCHLOROTHIAZIDE 25 MG/1
50 TABLET ORAL DAILY
Qty: 30 TABLET | Refills: 11
Start: 2018-11-14 | End: 2018-11-14

## 2018-11-14 RX ORDER — LOVASTATIN 40 MG/1
40 TABLET ORAL NIGHTLY
Qty: 90 TABLET | Refills: 1 | Status: SHIPPED | OUTPATIENT
Start: 2018-11-14 | End: 2019-02-22

## 2018-11-14 RX ORDER — LISINOPRIL 40 MG/1
40 TABLET ORAL DAILY
Qty: 90 TABLET | Refills: 3 | Status: SHIPPED | OUTPATIENT
Start: 2018-11-14 | End: 2019-02-22

## 2018-11-14 RX ORDER — AMLODIPINE BESYLATE 10 MG/1
10 TABLET ORAL DAILY
Qty: 90 TABLET | Refills: 3 | Status: SHIPPED | OUTPATIENT
Start: 2018-11-14 | End: 2019-11-25 | Stop reason: SDUPTHER

## 2018-11-14 RX ORDER — GLYBURIDE 5 MG/1
5 TABLET ORAL 2 TIMES DAILY WITH MEALS
Qty: 180 TABLET | Refills: 3 | Status: SHIPPED | OUTPATIENT
Start: 2018-11-14 | End: 2019-02-22

## 2018-11-14 RX ORDER — HYDROCHLOROTHIAZIDE 25 MG/1
25 TABLET ORAL DAILY
Qty: 90 TABLET | Refills: 2 | Status: SHIPPED | OUTPATIENT
Start: 2018-11-14 | End: 2019-02-22

## 2018-11-14 NOTE — TELEPHONE ENCOUNTER
CMP  Sodium   Date Value Ref Range Status   11/14/2018 139 136 - 145 mmol/L Final     Potassium   Date Value Ref Range Status   11/14/2018 4.5 3.5 - 5.1 mmol/L Final     Chloride   Date Value Ref Range Status   11/14/2018 102 95 - 110 mmol/L Final     CO2   Date Value Ref Range Status   11/14/2018 28 23 - 29 mmol/L Final     Glucose   Date Value Ref Range Status   11/14/2018 231 (H) 70 - 110 mg/dL Final     BUN, Bld   Date Value Ref Range Status   11/14/2018 14 8 - 23 mg/dL Final     Creatinine   Date Value Ref Range Status   11/14/2018 0.9 0.5 - 1.4 mg/dL Final   05/08/2012 0.9 0.2 - 1.4 mg/dl Final     Calcium   Date Value Ref Range Status   11/14/2018 10.0 8.7 - 10.5 mg/dL Final   05/08/2012 10.0 8.6 - 10.2 mg/dl Final     Total Protein   Date Value Ref Range Status   11/14/2018 7.3 6.0 - 8.4 g/dL Final     Albumin   Date Value Ref Range Status   11/14/2018 3.8 3.5 - 5.2 g/dL Final     Total Bilirubin   Date Value Ref Range Status   11/14/2018 0.4 0.1 - 1.0 mg/dL Final     Comment:     For infants and newborns, interpretation of results should be based  on gestational age, weight and in agreement with clinical  observations.  Premature Infant recommended reference ranges:  Up to 24 hours.............<8.0 mg/dL  Up to 48 hours............<12.0 mg/dL  3-5 days..................<15.0 mg/dL  6-29 days.................<15.0 mg/dL       Alkaline Phosphatase   Date Value Ref Range Status   11/14/2018 53 (L) 55 - 135 U/L Final   05/08/2012 60 23 - 119 UNIT/L Final     AST   Date Value Ref Range Status   11/14/2018 22 10 - 40 U/L Final   05/08/2012 15 10 - 34 UNIT/L Final     ALT   Date Value Ref Range Status   11/14/2018 24 10 - 44 U/L Final     Anion Gap   Date Value Ref Range Status   11/14/2018 9 8 - 16 mmol/L Final   05/08/2012 10 5 - 15 meq/L Final     eGFR if    Date Value Ref Range Status   11/14/2018 >60 >60 mL/min/1.73 m^2 Final     eGFR if non    Date Value Ref Range Status    11/14/2018 >60 >60 mL/min/1.73 m^2 Final     Comment:     Calculation used to obtain the estimated glomerular filtration  rate (eGFR) is the CKD-EPI equation.        Hypertension Mgt:   ` Continue Norvasc 10  ` Continue Lisinopril 40  ` Continue HCTZ (decrease dose to 25 vs 50 ) daily   ` Discontinue the Aldactone        SDJ

## 2018-11-15 ENCOUNTER — TELEPHONE (OUTPATIENT)
Dept: INTERNAL MEDICINE | Facility: CLINIC | Age: 63
End: 2018-11-15

## 2018-11-15 RX ORDER — FERROUS SULFATE 325(65) MG
325 TABLET ORAL
Refills: 0 | COMMUNITY
Start: 2018-11-15 | End: 2021-09-30

## 2018-11-15 RX ORDER — LANCETS
EACH MISCELLANEOUS
Qty: 100 EACH | Refills: 6 | Status: SHIPPED | OUTPATIENT
Start: 2018-11-15 | End: 2019-02-22

## 2018-11-15 RX ORDER — INSULIN PUMP SYRINGE, 3 ML
EACH MISCELLANEOUS
Qty: 1 EACH | Refills: 0 | Status: SHIPPED | OUTPATIENT
Start: 2018-11-15 | End: 2019-02-22

## 2018-11-15 NOTE — TELEPHONE ENCOUNTER
..Patient referred by Mayela Sharma [facility] for  Health coaching (Diabetes, lifestyle changes).  Called patient and gave an explanation about Health Coaching program and invited participation.   Patient states he would like to participate.  Set appointment for 11/19/18 at 1300.   Gave direct contact number to call if he/ she has any questions [phone].   Jordana Gonzalez RN  Health

## 2018-11-15 NOTE — TELEPHONE ENCOUNTER
Lab Results   Component Value Date    CHOL 156 11/14/2018    CHOL 223 (H) 01/28/2016    CHOL 217 (H) 12/04/2014     Lab Results   Component Value Date    HDL 45 11/14/2018    HDL 61 01/28/2016    HDL 55 12/04/2014     Lab Results   Component Value Date    LDLCALC 75.4 11/14/2018    LDLCALC 129.6 01/28/2016    LDLCALC 126.2 12/04/2014     Lab Results   Component Value Date    TRIG 178 (H) 11/14/2018    TRIG 162 (H) 01/28/2016    TRIG 179 (H) 12/04/2014     Lab Results   Component Value Date    CHOLHDL 28.8 11/14/2018    CHOLHDL 27.4 01/28/2016    CHOLHDL 25.3 12/04/2014         CMP  Sodium   Date Value Ref Range Status   11/14/2018 139 136 - 145 mmol/L Final     Potassium   Date Value Ref Range Status   11/14/2018 4.5 3.5 - 5.1 mmol/L Final     Chloride   Date Value Ref Range Status   11/14/2018 102 95 - 110 mmol/L Final     CO2   Date Value Ref Range Status   11/14/2018 28 23 - 29 mmol/L Final     Glucose   Date Value Ref Range Status   11/14/2018 231 (H) 70 - 110 mg/dL Final     BUN, Bld   Date Value Ref Range Status   11/14/2018 14 8 - 23 mg/dL Final     Creatinine   Date Value Ref Range Status   11/14/2018 0.9 0.5 - 1.4 mg/dL Final   05/08/2012 0.9 0.2 - 1.4 mg/dl Final     Calcium   Date Value Ref Range Status   11/14/2018 10.0 8.7 - 10.5 mg/dL Final   05/08/2012 10.0 8.6 - 10.2 mg/dl Final     Total Protein   Date Value Ref Range Status   11/14/2018 7.3 6.0 - 8.4 g/dL Final     Albumin   Date Value Ref Range Status   11/14/2018 3.8 3.5 - 5.2 g/dL Final     Total Bilirubin   Date Value Ref Range Status   11/14/2018 0.4 0.1 - 1.0 mg/dL Final     Comment:     For infants and newborns, interpretation of results should be based  on gestational age, weight and in agreement with clinical  observations.  Premature Infant recommended reference ranges:  Up to 24 hours.............<8.0 mg/dL  Up to 48 hours............<12.0 mg/dL  3-5 days..................<15.0 mg/dL  6-29 days.................<15.0 mg/dL       Alkaline  "Phosphatase   Date Value Ref Range Status   11/14/2018 53 (L) 55 - 135 U/L Final   05/08/2012 60 23 - 119 UNIT/L Final     AST   Date Value Ref Range Status   11/14/2018 22 10 - 40 U/L Final   05/08/2012 15 10 - 34 UNIT/L Final     ALT   Date Value Ref Range Status   11/14/2018 24 10 - 44 U/L Final     Anion Gap   Date Value Ref Range Status   11/14/2018 9 8 - 16 mmol/L Final   05/08/2012 10 5 - 15 meq/L Final     eGFR if    Date Value Ref Range Status   11/14/2018 >60 >60 mL/min/1.73 m^2 Final     eGFR if non    Date Value Ref Range Status   11/14/2018 >60 >60 mL/min/1.73 m^2 Final     Comment:     Calculation used to obtain the estimated glomerular filtration  rate (eGFR) is the CKD-EPI equation.      *Continue Mevacor 40 nightly       CBC  Lab Results   Component Value Date    WBC 8.10 11/14/2018    HGB 12.7 (L) 11/14/2018    HCT 42.1 11/14/2018    MCV 71 (L) 11/14/2018     11/14/2018   *Microcytic Anemia  ` referral has been placed for Colonoscopy and # to call and schedule.   ` start Iron supplements (ferrous sulfate 325 daily)      A1c: 11.9 (<< 11.2); chronically uncontrolled for the past 3 years (range: 9.6-11.9)  ` scheduled follow up with Endocrine (apt 1/2019); needs to start insulin; in opposition; has been on Diabeta (5/5) and Metformin (1000 / 1000) for years and has not been compliant with follow ups. He is "new" to this team of providers.   Have scheduled with Diabetic Educator for 12/7.     *Will schedule to follow up with our team of providers in 1 month (w/ Dr. BURAK Fortune). He has been instructed to bring his diabetic logs.         SDJ  "

## 2018-11-15 NOTE — TELEPHONE ENCOUNTER
----- Message from ARABELLA Rico sent at 11/14/2018 11:03 AM CST -----  Manuel Silveira,   Can you please get this gentleman in with you timely?   Saw him today in clinic to [facility] care. Please reference my note. Needs lots of education, diabetic machine and supplies (gave him one, but not sure if he can sustain the cost of supplies with this one and his current insurance).    Thanks!  Mayela

## 2018-11-19 ENCOUNTER — TELEPHONE (OUTPATIENT)
Dept: PODIATRY | Facility: CLINIC | Age: 63
End: 2018-11-19

## 2018-11-19 ENCOUNTER — CLINICAL SUPPORT (OUTPATIENT)
Dept: INTERNAL MEDICINE | Facility: CLINIC | Age: 63
End: 2018-11-19
Payer: MEDICARE

## 2018-11-19 ENCOUNTER — OFFICE VISIT (OUTPATIENT)
Dept: PODIATRY | Facility: CLINIC | Age: 63
End: 2018-11-19
Payer: MEDICARE

## 2018-11-19 VITALS
WEIGHT: 243 LBS | HEIGHT: 67 IN | DIASTOLIC BLOOD PRESSURE: 67 MMHG | BODY MASS INDEX: 38.14 KG/M2 | RESPIRATION RATE: 19 BRPM | SYSTOLIC BLOOD PRESSURE: 151 MMHG | HEART RATE: 55 BPM

## 2018-11-19 VITALS — WEIGHT: 243.38 LBS | BODY MASS INDEX: 38.12 KG/M2

## 2018-11-19 DIAGNOSIS — B35.1 ONYCHOMYCOSIS DUE TO DERMATOPHYTE: ICD-10-CM

## 2018-11-19 DIAGNOSIS — L84 CORN OR CALLUS: ICD-10-CM

## 2018-11-19 PROCEDURE — 99999 PR PBB SHADOW E&M-EST. PATIENT-LVL I: CPT | Mod: PBBFAC,,,

## 2018-11-19 PROCEDURE — 11055 PARING/CUTG B9 HYPRKER LES 1: CPT | Mod: Q9,59,PBBFAC | Performed by: PODIATRIST

## 2018-11-19 PROCEDURE — 99999 PR PBB SHADOW E&M-EST. PATIENT-LVL III: CPT | Mod: PBBFAC,,, | Performed by: PODIATRIST

## 2018-11-19 PROCEDURE — 11721 DEBRIDE NAIL 6 OR MORE: CPT | Mod: Q9,59,S$PBB, | Performed by: PODIATRIST

## 2018-11-19 PROCEDURE — 99211 OFF/OP EST MAY X REQ PHY/QHP: CPT | Mod: PBBFAC,25,27

## 2018-11-19 PROCEDURE — 99213 OFFICE O/P EST LOW 20 MIN: CPT | Mod: S$PBB,25,, | Performed by: PODIATRIST

## 2018-11-19 PROCEDURE — 99213 OFFICE O/P EST LOW 20 MIN: CPT | Mod: PBBFAC,25 | Performed by: PODIATRIST

## 2018-11-19 PROCEDURE — 11055 PARING/CUTG B9 HYPRKER LES 1: CPT | Mod: Q9,S$PBB,, | Performed by: PODIATRIST

## 2018-11-19 PROCEDURE — 11721 DEBRIDE NAIL 6 OR MORE: CPT | Mod: Q9,PBBFAC,59 | Performed by: PODIATRIST

## 2018-11-19 RX ORDER — TRIAMTERENE AND HYDROCHLOROTHIAZIDE 37.5; 25 MG/1; MG/1
1 CAPSULE ORAL
COMMUNITY
End: 2019-02-22

## 2018-11-19 RX ORDER — METHOCARBAMOL 500 MG/1
500 TABLET, FILM COATED ORAL
COMMUNITY
End: 2019-02-22

## 2018-11-19 RX ORDER — CETIRIZINE HYDROCHLORIDE 10 MG/1
10 TABLET ORAL
COMMUNITY
End: 2019-02-22

## 2018-11-19 RX ORDER — FLUTICASONE PROPIONATE 50 MCG
1 SPRAY, SUSPENSION (ML) NASAL
COMMUNITY

## 2018-11-19 NOTE — TELEPHONE ENCOUNTER
----- Message from Franny Monsalve sent at 11/19/2018  3:06 PM CST -----  Contact: Jordana Mota left his internal medicine appt and now  on the way

## 2018-11-19 NOTE — PROGRESS NOTES
Subjective:      Patient ID: Kilo Lam Sr. is a 62 y.o. male.    Chief Complaint: PCP (Mayela Nj, ARABELLA 11/14/18); Diabetic Foot Exam (CORN ); Foot Problem (numbness, tingling ); Foot Pain (ARCH left foot ); and Nail Care    Kilo is a 62 y.o. male who presents to the clinic for evaluation and treatment of high risk feet. Kilo has a past medical history of Anxiety, Carpal tunnel syndrome, bilateral (4/9/2013), Depression, Diabetes mellitus type II, Fatty liver, and HTN (hypertension). The patient's chief complaint is long, thick toenails. This patient has documented high risk feet requiring routine maintenance secondary to diabetes mellitis and those secondary complications of diabetes, as mentioned..    PCP: Gwyn Fortune MD    Date Last Seen by PCP:   Chief Complaint   Patient presents with    PCP     Mayela Nj, ARABELLA 11/14/18    Diabetic Foot Exam     CORN     Foot Problem     numbness, tingling     Foot Pain     ARCH left foot     Nail Care           Hemoglobin A1C   Date Value Ref Range Status   11/14/2018 11.9 (H) 4.0 - 5.6 % Final     Comment:     ADA Screening Guidelines:  5.7-6.4%  Consistent with prediabetes  >or=6.5%  Consistent with diabetes  High levels of fetal hemoglobin interfere with the HbA1C  assay. Heterozygous hemoglobin variants (HbS, HgC, etc)do  not significantly interfere with this assay.   However, presence of multiple variants may affect accuracy.     06/11/2016 11.2 (H) 4.5 - 6.2 % Final   01/28/2016 9.6 (H) 4.5 - 6.2 % Final       Review of Systems   Constitution: Negative for chills, decreased appetite and fever.   Cardiovascular: Negative for leg swelling.   Skin: Positive for dry skin and nail changes.   Musculoskeletal: Positive for joint pain. Negative for arthritis, joint swelling and myalgias.   Gastrointestinal: Negative for nausea and vomiting.   Neurological: Negative for loss of balance, numbness and paresthesias.           Objective:     "   Vitals:    11/19/18 1530   BP: (!) 151/67   Pulse: (!) 55   Resp: 19   Weight: 110.2 kg (243 lb)   Height: 5' 7" (1.702 m)   PainSc:   3   PainLoc: Foot        Physical Exam   Constitutional: He is oriented to person, place, and time. He appears well-developed and well-nourished.   Cardiovascular:   Pulses:       Dorsalis pedis pulses are 2+ on the right side, and 2+ on the left side.        Posterior tibial pulses are 1+ on the right side, and 1+ on the left side.   Dorsalis pedis and posterior tibial pulses are decreased Bilaterally. Toes are cool to touch. Feet are warm proximally.There is decreased digital hair. Skin is atrophic, slightly hyperpigmented, and mildly edematous       Musculoskeletal: Normal range of motion. He exhibits no edema or tenderness.   Adequate joint range of motion without pain, limitation, nor crepitation Bilateral feet and ankle joints. Muscle strength is 5/5 in all groups bilaterally.         Neurological: He is alert and oriented to person, place, and time.   [place]-Jennifer 5.07 monofilamant testing is diminished Shiva feet. Sharp/dull sensation diminished Bilaterally. Light touch absent Bilaterally.       Skin: Skin is warm, dry and intact. No ecchymosis and no lesion noted. No erythema.   Nails x 10  are elongated by  2-4mm's, thickened by 2-3 mm's, dystrophic, and are darkened in  coloration . Xerosis Bilaterally. No open lesions noted.    Hyperkeratotic tissue noted to distal L 2nd toe      Psychiatric: He has a normal mood and affect. His behavior is normal.             Assessment:       Encounter Diagnoses   Name Primary?    Diabetes mellitus with neurological manifestations, uncontrolled Yes    Corn or callus     Onychomycosis due to dermatophyte          Plan:       Kilo was seen today for pcp, diabetic foot exam, foot problem, foot pain and nail care.    Diagnoses and all orders for this visit:    Diabetes mellitus with neurological manifestations, " uncontrolled    Corn or callus    Onychomycosis due to dermatophyte      I counseled the patient on his conditions, their implications and medical management.        - Shoe inspection. Diabetic Foot Education. Patient reminded of the importance of good nutrition and blood sugar control to help prevent podiatric complications of diabetes. Patient instructed on proper foot hygeine. We discussed wearing proper shoe gear, daily foot inspections, never walking without protective shoe gear, never putting sharp instruments to feet, routine podiatric nail visits every 2-3 months.      - With patient's permission, nails were aggressively reduced and debrided x 10 to their soft tissue attachment mechanically and with electric , removing all offending nail and debris. Patient relates relief following the procedure. He will continue to monitor the areas daily, inspect his feet, wear protective shoe gear when ambulatory, moisturizer to maintain skin integrity and follow in this office in approximately 2-3 months, sooner p.r.n.    - After cleansing the  area w/ alcohol prep pad the above mentioned hyperkeratosis was trimmed utilizing No 15 scapel, to a smooth base with out incident. Patient tolerated this  well and reported comfort to the area of distal L 2nd toe

## 2018-11-19 NOTE — LETTER
November 19, 2018      Mayela Nj, FNP  [address]  [facility] [address]           [facility] - Podiatry  [address]  [facility] [address]  Phone: [phone]          Patient: Kilo Lam Sr.   MR Number: [id number]   YOB: 1955   Date of Visit: 11/19/2018       Dear Mayela Nj:    Thank you for referring Kilo Lam to me for evaluation. Attached you will find relevant portions of my assessment and plan of care.    If you have questions, please do not hesitate to call me. I look forward to following Kilo Lam along with you.    Sincerely,    Deana Rubio, RASHAAD    Enclosure  CC:  No Recipients    If you would like to receive this communication electronically, please contact [email or web address] or [phone] to request more information on [facility] [employer] access.    For providers and/or their staff who would like to refer a patient to [facility], please contact us through our one-stop-shop provider referral line, [facility], at [phone].    If you feel you have received this communication in error or would no longer like to receive these types of communications, please e-mail [email or web address]

## 2018-11-19 NOTE — PROGRESS NOTES
Date:    11/19/18                  Time: 1300  Initial Health  Contact - [x]Clinic visit  []  Phone Visit  ASSEMENT: Information obtained through combination of chart review prior to seeing patient, patient self-report.   Primary Referral diagnosis/reason for referral:    Diabetes       (See physician progress note for complete list of diagnoses.)  PCP:   Dr. Fortune/Mayela Muniz     Referent :  [x] PCP       [] Transition Navigator    []  E.BURAK    []  APRN  []  Other:     Supports:    Wife      Preferred Learning Style  (may be a combination)  [x]  Visual (pictures/ video)     [x] Auditory/ Listening   [x]  Reading    []  Hands-on/ Demonstration   []  1:1    []  Group        []  Alone       []  No preference   [x]  Combination  []  Other:         Presenting issues from patients point of view:  [x]  Improve nutrition/increase healthy choices.                    []  Improve /maintain current functioning.  [x]  Obtain and maintain healthy blood pressure.                  []  Stop smoking.  [x]  Improve weight management.                                       [x]  Lower cholesterol.  [x]  Improve blood glucose management.                            []  Improve breathing.  [x]  Increase energy level.                                                      []  Increase/maintain independence.   [x]  Improve sleep.                                                                [x]  Decrease stress.  [x]  Improve pain management.                                             [x]  Improve mood.  []  Other:                  Pt. Education Level:     12+ 2     Disease specific education services attended:  Yes Diabetes       Patient Employed:  [x]yes    [] No  Where __[employer]___  Days and Hours:    Part time            Current Self-help Behaviors  []  Checks glucose level        times a day.  [x]  Tries to modify meals so more healthy.  []  Exercises by        []  Takes BP        times a        (insert frequency)  [x]  Weighs self    weekly     (how often)  [x]  Takes all medications as prescribed.  []  Rarely misses health care appointments.  []  Sleeps 8 hours without waking more than twice.  []  Consistently wears/uses functioning aids as recommended  (ie:  Contacts [] , glasses [] , hearing  Aid [] , prosthesis [] ,  Walker [] ,  Wheelchair []  etc.)  [x]  Regularly engages in stress management activities I.e.:  [x]  Quit smoking 10 yrs ago 1 pack a day smoke x 8 yrs  []  Purposefully educates self about health issues.  [x]  Pt did bring glucose log with him/her.  []  Other  (explain)           []  Comments:       []  Reported Blood Sugar Readings:          Health screenings   Last PCP visit:  11/14/18                                                       PSA:    5/29/15       Colon: 2004 order placed   Lipids: 11/14/18   CMP: 11/14/18   HgA1C:11/14/18   Urine Microalbumin-Creatinine:             Eye Exam:   8/18/16  Scheduled 12/10/18   Foot Exam:  6/27/16   Scheduled 11/19/18     Strengths:  [x]  Confident                       []  Determined/persistent           [x]  Enthusiastic         [x]  Good problem solving           []  Good self-control                   [x]  Hard working        []  Hopeful/optimistic                  [x]  Intelligent                                [x]  Self aware  []  Creative                                 [x]  Flexible          [x]  Sense of humor                     [x]  Open/willing          [x]  Spiritual                                  [x] Values health    [x]  Successful overcoming difficult challenges in the past.     []  Pos support network  [x]   Health literate (The degree to which individuals have the capacity to obtain, process, and    understand basic health information and services needed to make appropriate health decisions.- Dept. of Health and Human services.)  []   Other Comments:             Change Markers  Scale 0-10 with 10 representing most  Ready 0 least ready, 10 most important 0 least important 10 most confident 0 least confident.     [] NA at this time.   Readiness: 9    ;  Importance: 10    ;  Confidence:  8      INTERVENTIONS:  [x] Given an explanation about health coaching program and invited participation.  [x] Listened reflectively; provided support, encouragement, and validation; respected  and maintained patient self-direction; explored pros/cons of change; personalized health risks of maintaining the status quo; and facilitated recognition of discrepancy between current behaviors and patients goals and values.  [x] Assessed stage of change and employed appropriate motivational interviewing strategies to facilitate movement toward the next stage of change and healthy behavior modification.  [x] Normalized occurrence of relapse, helped patient recognize outcome of new self-learning as a result of the relapse such as triggers, and helped focus on factors to reduce chances of returning to previous behaviors .  [x] Used readiness scale ratings to guide assessment of importance and confidence of successfully reaching goals.  [x] Assisted patient to develop goal, action plan, and address potential barriers to goal     achievement.  [] Patient was given a new (insert glucose meter or other supplies), taught to use, and      successfully demonstrated ability to carry out essential steps of process.    [] Rx for ( monitor/supplies, pen needles, etc.) was obtained.  [x] Provided educational review, written materials/handouts (Meal Planning Counting Carbs, Healthy Plate, 10 Rules for Eating Safely for Life, 10 Tips to Cutting Your Cravings, Probiotics).   [x] Topics discussed/provided:    GI of foods and how it affects your metabolism and helps you burn fat, carb counting    [x] Facilitated completion of needed exams and screenings by reviewing Diabetic/Health Maintenance Report Card with patient.  [x] Provided pt with my business card.  [x] Informed  of/reviewed how to access health  and after hours assistance.  [x] Discussed, planned, and scheduled future contacts.  [x]Challenges/Barriers identified discussed as identified by patient.  [x] Needs identified by this Health :    Education and support     [] Other:            For additional care management support patient was referred to:        []  Community resources for                        []  Medication assistance programs               []  Dietician              []  Diabetes  [place]                                        []  Mental health services                           []                  []  Diabetes [place]                                              []  Home health services                                []  Complex case management              []  PCP/covering provider due to                 []  Emergency Dept.                                  []  GYN              []  Optometrist/ Ophthalmologist                          []  Podiatrist                                                      [x]  N/A        []  Other:           RESPONSE/IMPRESSION  Behavior is consistent with the following stage of change:  []  Pre-contemplation  []  Contemplation  [x]  Preparation  []  Action  []  Maintenance  []  Relapse    Level of participation:  []  Refused to participate  []  Guarded / resistant  []  Passive participant  [x]  Active participant/interactive  [x]  Interested/receptive  [x]  Self-motivated  []  Other          Goal developed by patient today:   Patient took goal sheet home and will bring back with him at next visit.    Plan (needed actions to accomplish goal):          [x] Pt will work on action plan as stated above.        [x] Pt will follow up with Health  on   11/27/18 @ 230pm     [x] Health  will remain available.  [x] Health  will maintain regular contacts with patient to educate, support, encourage; help problem-solve; assist with development of  self-advocacy/increasing independent health management; and provide resources as needed.  [] Pt has declined Health  Program at this time. Provided pt with Health  Program information should they decide to participate at a later time.        [] Pt to facilitate contact with Health        [] Health  to facilitate contact with patient.        [] Other:          Notes:   Met with patient he is interested in losing weight and getting his blood sugar levels down.  His highest weight was 312 in 2005 and lost down to 240's.   His best reasons are to improve overall health, get off medications and it effects his sex life.   His last A1c was 11.9 -11/14/18. His average CBD 7 day is 215. He quit smoking 10 yrs ago.   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   Goals set by patient and HC will continue to work with patient to assist to reach his goals.    Best Method of Contact:  [x] email:   [] Phone:   [] Mail  [] Office     Jordana Gonzalez RN HC

## 2019-01-15 ENCOUNTER — TELEPHONE (OUTPATIENT)
Dept: INTERNAL MEDICINE | Facility: CLINIC | Age: 64
End: 2019-01-15

## 2019-01-15 NOTE — PROGRESS NOTES
"INTERNAL MEDICINE CLINIC - SAME DAY APPOINTMENT  Progress Note    PRESENTING HISTORY     PCP: Gwyn Fortune MD  Chief Complaint/Reason for Visit:   No chief complaint on file.      History of Present Illness & ROS : Mr. Kilo Lam Sr. is a 63 y.o. male.    Mr. Lam is here today for an  visit.   Seen in November 2018 to .   Here today with complaints of "yeast infection to bilateral groins, below scrotum and to tip of penis".   Has been trialing Nizoral cream, but not improving. Has reportedly been going on the past "week".   This is a recurrent issue that has been going on for years for this gentleman. He understands that he needs to gain better control of his diabetes mgt, remains opposed to starting insulin therapy, despite the negative outcomes.       Review of Systems:  Eyes: denies visual changes at this time denies floaters   ENT: no nasal congestion or sore throat  Respiratory: no cough or shorness of breath  Cardiovascular: no chest pain or palpitations  Gastrointestinal: no nausea or vomiting, no abdominal pain or change in bowel habits  Genitourinary: no hematuria or dysuria; denies frequency  Hematologic/Lymphatic: no easy bruising or lymphadenopathy  Musculoskeletal: no arthralgias or myalgias  Neurological: no seizures or tremors  Endocrine: no heat or cold intolerance      PAST HISTORY:     Past Medical History:   Diagnosis Date    Anxiety     Carpal tunnel syndrome, bilateral 4/9/2013    S/p Lt Carpal tunnel release 11/2011     Chronic LBP 2/9/2015    Depression     Diabetes mellitus type II     DJD (degenerative joint disease) of lumbar spine 2/9/2015    Essential hypertension 11/6/2012    Fatty liver     HTN (hypertension)     Morbid obesity 4/9/2013    BMI= 41.2 on 04/09/2013     Type 2 diabetes mellitus with diabetic neuropathy, without long-term current use of insulin 12/5/2014       Past Surgical History:   Procedure Laterality Date    CARPAL TUNNEL RELEASE Left 11/2011    " no relief of hand symptoms    COLONOSCOPY      normal, repeat in 7 yrs    INCISIONAL HERNIA REPAIR  2012    inguinal    REPAIR, HERNIA, INGUINAL, WITHOUT HISTORY OF PRIOR REPAIR, AGE 3 YEARS OR OLDER Left 2012    Performed by [facility] Jr[facility] at [facility] OR [address] [place] [address]    SHOULDER SURGERY Left        Family History   Problem Relation Age of Onset    Diabetes Mother     Diabetes Father     Heart failure Father     Cancer Daughter 14        Leukemia, passed    Cancer Other         niece, Lung cancer    Liver disease Neg Hx     Colon cancer Neg Hx        Social History     Socioeconomic History    Marital status:      Spouse name: Not on file    Number of children: Not on file    Years of education: Not on file    Highest education level: Not on file   Social Needs    Financial resource strain: Not on file    Food insecurity - worry: Not on file    Food insecurity - inability: Not on file    Transportation needs - medical: Not on file    Transportation needs - non-medical: Not on file   Occupational History    Not on file   Tobacco Use    Smoking status: Former Smoker     Last attempt to quit: 2009     Years since quittin.9    Smokeless tobacco: Never Used   Substance and Sexual Activity    Alcohol use: No    Drug use: No    Sexual activity: Not on file   Other Topics Concern    Not on file   Social History Narrative        Retired .       MEDICATIONS & ALLERGIES:     Current Outpatient Medications on File Prior to Visit   Medication Sig Dispense Refill    alprazolam (XANAX) 0.5 MG tablet Take 0.5 mg by mouth daily as needed.       amitriptyline (ELAVIL) 10 MG tablet Take 10 mg by mouth nightly as needed for Insomnia.      amLODIPine (NORVASC) 10 MG tablet Take 1 tablet (10 mg total) by mouth once daily. 90 tablet 3    aspirin (ECOTRIN) 81 MG EC tablet Take 81 mg by mouth once daily.      blood sugar diagnostic Strp To check BG 3 times daily,  to use with insurance preferred meter 100 strip 6    blood-glucose meter kit To check BG 3 times daily, to use with insurance preferred meter 1 each 0    cetirizine (ZYRTEC) 10 MG tablet Take 10 mg by mouth.      ferrous sulfate (FEOSOL) 325 mg (65 mg iron) Tab tablet Take 1 tablet (325 mg total) by mouth daily with breakfast.  0    fluconazole (DIFLUCAN) 150 MG Tab Take 150 mg by mouth as needed.       fluticasone (FLONASE) 50 mcg/actuation nasal spray 1 spray by Nasal route.      gabapentin (NEURONTIN) 100 MG capsule Take 1 capsule (100 mg total) by mouth 3 (three) times daily. 90 capsule 11    glyBURIDE (DIABETA) 5 MG tablet Take 1 tablet (5 mg total) by mouth 2 (two) times daily with meals. 180 tablet 3    hydroCHLOROthiazide (HYDRODIURIL) 25 MG tablet Take 1 tablet (25 mg total) by mouth once daily. 90 tablet 2    lancets Misc To check BG 3 times daily, to use with insurance preferred meter 100 each 6    lisinopril (PRINIVIL,ZESTRIL) 40 MG tablet Take 1 tablet (40 mg total) by mouth once daily. 90 tablet 3    lovastatin (MEVACOR) 40 MG tablet Take 1 tablet (40 mg total) by mouth every evening. 90 tablet 1    meloxicam (MOBIC) 15 MG tablet Take 1 tablet (15 mg total) by mouth once daily. 30 tablet 0    metFORMIN (GLUCOPHAGE) 1000 MG tablet Take 1 tablet (1,000 mg total) by mouth 2 (two) times daily with meals. 180 tablet 3    methocarbamol (ROBAXIN) 500 MG Tab Take 500 mg by mouth.      nystatin (MYCOSTATIN) cream Apply topically 2 (two) times daily as needed.       triamcinolone acetonide 0.1% (KENALOG) 0.1 % cream       triamterene-hydrochlorothiazide 37.5-25 mg (DYAZIDE) 37.5-25 mg per capsule Take 1 capsule by mouth.       No current facility-administered medications on file prior to visit.         Review of patient's allergies indicates:   Allergen Reactions    No known drug allergies        Medications Reconciliation:   I have reconciled the patient's home medications with the  patient/family. I have updated all changes.  Refer to After-Visit Medication List.    OBJECTIVE:     Vital Signs:  There were no vitals filed for this visit.  Wt Readings from Last 1 Encounters:   11/19/18 1530 110.2 kg (243 lb)     There is no height or weight on file to calculate BMI.     Wt Readings from Last 3 Encounters:   01/16/19 109.9 kg (242 lb 4.6 oz)   11/19/18 110.2 kg (243 lb)   11/19/18 110.4 kg (243 lb 6.2 oz)     Temp Readings from Last 3 Encounters:   08/23/17 98.8 °F (37.1 °C) (Oral)   02/01/13 98.7 °F (37.1 °C)   01/28/13 98.9 °F (37.2 °C)     BP Readings from Last 3 Encounters:   01/16/19 126/74   11/19/18 (!) 151/67   11/14/18 (!) 160/72     Pulse Readings from Last 3 Encounters:   01/16/19 61   11/19/18 (!) 55   11/14/18 67          Physical Exam:  General: Well developed, well nourished. No distress.  HEENT: Head is normocephalic, atraumatic; ears are normal.   Eyes: Clear conjunctiva.  Neck: Supple, symmetrical neck; trachea midline.  Lungs: Clear to auscultation bilaterally and normal respiratory effort.  Cardiovascular: Heart with regular rate and rhythm. No murmurs, gallops or rubs  Extremities: No LE edema. Pulses 2+ and symmetric.   Abdomen: Abdomen is soft, non-tender non-distended with normal bowel sounds.  Skin: Skin color, texture, turgor normal. No rashes.  + erythema and satellite lesions to bilateral groin region and perineum (patent is very modest about having provider exam)  Musculoskeletal: Normal gait.   Lymph Nodes: No cervical or supraclavicular adenopathy.  Neurologic: Normal strength and tone. No focal numbness or weakness.   Psychiatric: Not depressed.        Laboratory  Lab Results   Component Value Date    WBC 8.10 11/14/2018    HGB 12.7 (L) 11/14/2018    HCT 42.1 11/14/2018     11/14/2018    CHOL 156 11/14/2018    TRIG 178 (H) 11/14/2018    HDL 45 11/14/2018    ALT 24 11/14/2018    AST 22 11/14/2018     11/14/2018    K 4.5 11/14/2018     11/14/2018     CREATININE 0.9 11/14/2018    BUN 14 11/14/2018    CO2 28 11/14/2018    TSH 1.329 12/04/2014    PSA 0.45 05/29/2015    INR 0.9 01/30/2013    HGBA1C 11.9 (H) 11/14/2018         ASSESSMENT & PLAN:       Mr. Lam is here today for an UC visit.       Intertrigo / Candidal Cruris:  ` Ketaconazole 2% topically x 4 weeks  ` Diflucan 150 mg tab weekly x 4 weeks    *Discussed the causes and preventative measures to Candidal infections.   Suggested that he wears boxers over briefs over her the next 4 weeks, keep area clean and dry and may apply Zeasorb powder after personal PM care at night, and apply Ketaconazole 2% cream upon awakening in the morning, for the next 4 weeks.   If continues to persist, without improvement, will need to be referred to Dermatology.       *HAve discussed with Mr. Lam in length again, control of Diabetes is imperative. Noted that he is scheduled to be seen by Dr. Nava on 1/22. Have reiterated that he needs to be commenced to Insulin therapy at this time. He is still in opposition despite the negative outcomes.         Pre-Exisiting Medical Issues:     Chronically uncontrolled Diabetes Mellitus, II (for the past 3 years):   POCT in clinic today:   *A1c: 11.9 % from 11/14/2018  Only on Diabeta and Metformin  Needs to be on Insulin; *Have discussed with Mr. Lam in length again, control of Diabetes is imperative. Noted that he is scheduled to be seen by Dr. Nava on 1/22. Have reiterated that he needs to be commenced to Insulin therapy at this time.   When seen, he was arranged follow ups, and didn't keep them....(Diabetic Educator, Collaborator, Optometry. No showed; noted the rescheduleds)  ` apt with Diabetic Educator 1/23  ` apt with Endocrine 1/22  ` apt with Podiatry 2/28        Hypertension:   *Goal: < 130/90  Today: 126/74 (controlled)   ` continue Norvasc, Aldactone, HCTZ and Lisinopril (consider changing to ARB on next clinic visit)          Future Appointments   Date Time Provider  Department Center   1/16/2019  9:00 AM ARABELLA Rico [facility] IM [address]   1/22/2019 10:00 AM Lida Nava MD [facility] ENDODIA [address]   1/23/2019 10:30 AM DIABETES EDUCATOR, INT MED 2 [facility] INTLDIA [address]   2/21/2019  9:00 AM Deana Rubio DPM [facility] POD [address]   2/28/2019  9:00 AM Deana Rubio DPM [facility] POD [address]       After Visit Medication List :     Medication List           Accurate as of 1/15/19 12:50 PM. If you have any questions, ask your nurse or doctor.               CONTINUE taking these medications    ALPRAZolam 0.5 MG tablet  Commonly known as:  XANAX     amitriptyline 10 MG tablet  Commonly known as:  ELAVIL     amLODIPine 10 MG tablet  Commonly known as:  NORVASC  Take 1 tablet (10 mg total) by mouth once daily.     aspirin 81 MG EC tablet  Commonly known as:  ECOTRIN     blood sugar diagnostic Strp  To check BG 3 times daily, to use with insurance preferred meter     blood-glucose meter kit  To check BG 3 times daily, to use with insurance preferred meter     cetirizine 10 MG tablet  Commonly known as:  ZYRTEC     ferrous sulfate 325 mg (65 mg iron) Tab tablet  Commonly known as:  FEOSOL  Take 1 tablet (325 mg total) by mouth daily with breakfast.     fluconazole 150 MG Tab  Commonly known as:  DIFLUCAN     fluticasone 50 mcg/actuation nasal spray  Commonly known as:  FLONASE     gabapentin 100 MG capsule  Commonly known as:  NEURONTIN  Take 1 capsule (100 mg total) by mouth 3 (three) times daily.     glyBURIDE 5 MG tablet  Commonly known as:  DIABETA  Take 1 tablet (5 mg total) by mouth 2 (two) times daily with meals.     hydroCHLOROthiazide 25 MG tablet  Commonly known as:  HYDRODIURIL  Take 1 tablet (25 mg total) by mouth once daily.     lancets Misc  To check BG 3 times daily, to use with insurance preferred meter     lisinopril 40 MG tablet  Commonly known as:  PRINIVIL,ZESTRIL  Take 1 tablet (40 mg total) by mouth once daily.     lovastatin 40 MG  tablet  Commonly known as:  MEVACOR  Take 1 tablet (40 mg total) by mouth every evening.     meloxicam 15 MG tablet  Commonly known as:  MOBIC  Take 1 tablet (15 mg total) by mouth once daily.     metFORMIN 1000 MG tablet  Commonly known as:  GLUCOPHAGE  Take 1 tablet (1,000 mg total) by mouth 2 (two) times daily with meals.     methocarbamol 500 MG Tab  Commonly known as:  ROBAXIN     nystatin cream  Commonly known as:  MYCOSTATIN     triamcinolone acetonide 0.1% 0.1 % cream  Commonly known as:  KENALOG     triamterene-hydrochlorothiazide 37.5-25 mg 37.5-25 mg per capsule  Commonly known as:  DYAZIDE            Signing Physician:  ARABELLA Michaels

## 2019-01-15 NOTE — TELEPHONE ENCOUNTER
ashley     Spoke with pt. Pt states     he is having a yeast infection. Pt stated he took the medication but its not working.

## 2019-01-16 ENCOUNTER — OFFICE VISIT (OUTPATIENT)
Dept: INTERNAL MEDICINE | Facility: CLINIC | Age: 64
End: 2019-01-16
Payer: MEDICARE

## 2019-01-16 VITALS
HEART RATE: 61 BPM | BODY MASS INDEX: 38.03 KG/M2 | SYSTOLIC BLOOD PRESSURE: 126 MMHG | WEIGHT: 242.31 LBS | DIASTOLIC BLOOD PRESSURE: 74 MMHG | HEIGHT: 67 IN | OXYGEN SATURATION: 98 %

## 2019-01-16 DIAGNOSIS — B35.6 TINEA CRURIS: Primary | ICD-10-CM

## 2019-01-16 DIAGNOSIS — L30.4 INTERTRIGO: ICD-10-CM

## 2019-01-16 PROCEDURE — 99999 PR PBB SHADOW E&M-EST. PATIENT-LVL V: ICD-10-PCS | Mod: PBBFAC,,, | Performed by: NURSE PRACTITIONER

## 2019-01-16 PROCEDURE — 99214 PR OFFICE/OUTPT VISIT, EST, LEVL IV, 30-39 MIN: ICD-10-PCS | Mod: S$PBB,,, | Performed by: NURSE PRACTITIONER

## 2019-01-16 PROCEDURE — 99215 OFFICE O/P EST HI 40 MIN: CPT | Mod: PBBFAC | Performed by: NURSE PRACTITIONER

## 2019-01-16 PROCEDURE — 99999 PR PBB SHADOW E&M-EST. PATIENT-LVL V: CPT | Mod: PBBFAC,,, | Performed by: NURSE PRACTITIONER

## 2019-01-16 PROCEDURE — 99214 OFFICE O/P EST MOD 30 MIN: CPT | Mod: S$PBB,,, | Performed by: NURSE PRACTITIONER

## 2019-01-16 RX ORDER — FLUCONAZOLE 150 MG/1
TABLET ORAL
Qty: 8 TABLET | Refills: 0 | Status: SHIPPED | OUTPATIENT
Start: 2019-01-16 | End: 2019-02-22 | Stop reason: SDUPTHER

## 2019-01-16 RX ORDER — KETOCONAZOLE 20 MG/G
CREAM TOPICAL DAILY
Qty: 60 G | Refills: 2 | Status: SHIPPED | OUTPATIENT
Start: 2019-01-16 | End: 2019-05-08

## 2019-01-25 ENCOUNTER — TELEPHONE (OUTPATIENT)
Dept: ADMINISTRATIVE | Facility: HOSPITAL | Age: 64
End: 2019-01-25

## 2019-01-25 ENCOUNTER — PATIENT MESSAGE (OUTPATIENT)
Dept: INTERNAL MEDICINE | Facility: CLINIC | Age: 64
End: 2019-01-25

## 2019-01-25 NOTE — TELEPHONE ENCOUNTER
"  Message rec'd via the Portal:   Good morning Doctor,   This is your patient Kilo Lam  51, I am not having a good morning today. I awoke this morning in pain again in my genital-groin area. The last medications you prescribed me, the ketoconzole creme 2%  I was able to obtain, however the other medicine (the pills I was not able to obtain) due to restrictions on my Medicare coverage. I am going to try to buy the medicine out of pocket this morning as soon as the pharmacy opens.Can you send in another prescription for something for pain? Please I would appreciate any help or suggestions you may have. I will keep you posted.     Message Reply to Patient (also called and left VM in regards):  Mr. Lam,   We don't treat Candida with "pain medications". Please try to start the medication, or have your pharmacy call my clinic to address.   Now if you are having uncontrollable  "groin pain", which sounds like a separate issue, you should go the Emergency Room.     Mayela IBANEZ"

## 2019-02-20 PROBLEM — F41.9 ANXIETY AND DEPRESSION: Status: ACTIVE | Noted: 2018-11-14

## 2019-02-22 ENCOUNTER — HOSPITAL ENCOUNTER (OUTPATIENT)
Dept: RADIOLOGY | Facility: HOSPITAL | Age: 64
Discharge: HOME | End: 2019-02-22
Attending: INTERNAL MEDICINE
Payer: MEDICARE

## 2019-02-22 ENCOUNTER — OFFICE VISIT (OUTPATIENT)
Dept: INTERNAL MEDICINE | Facility: CLINIC | Age: 64
End: 2019-02-22
Payer: MEDICARE

## 2019-02-22 VITALS
HEIGHT: 66 IN | HEART RATE: 87 BPM | WEIGHT: 240.31 LBS | DIASTOLIC BLOOD PRESSURE: 80 MMHG | SYSTOLIC BLOOD PRESSURE: 157 MMHG | OXYGEN SATURATION: 95 % | BODY MASS INDEX: 38.62 KG/M2

## 2019-02-22 DIAGNOSIS — Z98.890 HISTORY OF INGUINAL HERNIA REPAIR: ICD-10-CM

## 2019-02-22 DIAGNOSIS — B35.6 TINEA CRURIS: ICD-10-CM

## 2019-02-22 DIAGNOSIS — E78.2 MIXED HYPERLIPIDEMIA: ICD-10-CM

## 2019-02-22 DIAGNOSIS — N40.1 BPH ASSOCIATED WITH NOCTURIA: ICD-10-CM

## 2019-02-22 DIAGNOSIS — R35.1 BPH ASSOCIATED WITH NOCTURIA: ICD-10-CM

## 2019-02-22 DIAGNOSIS — E11.40 TYPE 2 DIABETES MELLITUS WITH DIABETIC NEUROPATHY, WITHOUT LONG-TERM CURRENT USE OF INSULIN: Primary | ICD-10-CM

## 2019-02-22 DIAGNOSIS — E66.01 MORBID OBESITY: ICD-10-CM

## 2019-02-22 DIAGNOSIS — F41.9 ANXIETY AND DEPRESSION: ICD-10-CM

## 2019-02-22 DIAGNOSIS — R10.30 LOWER ABDOMINAL PAIN: ICD-10-CM

## 2019-02-22 DIAGNOSIS — Z12.11 SCREENING FOR COLON CANCER: ICD-10-CM

## 2019-02-22 DIAGNOSIS — I10 ESSENTIAL HYPERTENSION: ICD-10-CM

## 2019-02-22 DIAGNOSIS — Z87.19 HISTORY OF INGUINAL HERNIA REPAIR: ICD-10-CM

## 2019-02-22 DIAGNOSIS — K76.0 FATTY LIVER: ICD-10-CM

## 2019-02-22 DIAGNOSIS — D50.9 MICROCYTIC ANEMIA: ICD-10-CM

## 2019-02-22 DIAGNOSIS — M89.9 DISORDER OF BONE: ICD-10-CM

## 2019-02-22 DIAGNOSIS — Z12.5 PROSTATE CANCER SCREENING: ICD-10-CM

## 2019-02-22 DIAGNOSIS — F32.A ANXIETY AND DEPRESSION: ICD-10-CM

## 2019-02-22 PROCEDURE — 99215 OFFICE O/P EST HI 40 MIN: CPT | Mod: PBBFAC | Performed by: INTERNAL MEDICINE

## 2019-02-22 PROCEDURE — 99999 PR PBB SHADOW E&M-EST. PATIENT-LVL V: ICD-10-PCS | Mod: PBBFAC,,, | Performed by: INTERNAL MEDICINE

## 2019-02-22 PROCEDURE — 99215 PR OFFICE/OUTPT VISIT, EST, LEVL V, 40-54 MIN: ICD-10-PCS | Mod: S$PBB,,, | Performed by: INTERNAL MEDICINE

## 2019-02-22 PROCEDURE — 25500020 PHARM REV CODE 255: Performed by: INTERNAL MEDICINE

## 2019-02-22 PROCEDURE — 74176 CT ABDOMEN PELVIS WITHOUT CONTRAST: ICD-10-PCS | Mod: 26,,, | Performed by: RADIOLOGY

## 2019-02-22 PROCEDURE — 99999 PR PBB SHADOW E&M-EST. PATIENT-LVL V: CPT | Mod: PBBFAC,,, | Performed by: INTERNAL MEDICINE

## 2019-02-22 PROCEDURE — 74176 CT ABD & PELVIS W/O CONTRAST: CPT | Mod: TC

## 2019-02-22 PROCEDURE — 99215 OFFICE O/P EST HI 40 MIN: CPT | Mod: S$PBB,,, | Performed by: INTERNAL MEDICINE

## 2019-02-22 PROCEDURE — 74176 CT ABD & PELVIS W/O CONTRAST: CPT | Mod: 26,,, | Performed by: RADIOLOGY

## 2019-02-22 RX ORDER — ROSUVASTATIN CALCIUM 10 MG/1
10 TABLET, COATED ORAL DAILY
Qty: 90 TABLET | Refills: 3 | Status: SHIPPED | OUTPATIENT
Start: 2019-02-22 | End: 2020-05-25 | Stop reason: SDUPTHER

## 2019-02-22 RX ORDER — GABAPENTIN 300 MG/1
600 CAPSULE ORAL NIGHTLY
Qty: 180 CAPSULE | Refills: 3 | Status: SHIPPED | OUTPATIENT
Start: 2019-02-22 | End: 2019-06-14

## 2019-02-22 RX ORDER — FLUCONAZOLE 200 MG/1
200 TABLET ORAL DAILY
Qty: 10 TABLET | Refills: 0 | Status: SHIPPED | OUTPATIENT
Start: 2019-02-22 | End: 2019-03-04

## 2019-02-22 RX ORDER — DAPAGLIFLOZIN 10 MG/1
1 TABLET, FILM COATED ORAL DAILY
Qty: 90 TABLET | Refills: 3 | Status: SHIPPED | OUTPATIENT
Start: 2019-02-22 | End: 2019-05-09

## 2019-02-22 RX ORDER — INSULIN PUMP SYRINGE, 3 ML
EACH MISCELLANEOUS
Qty: 1 EACH | Refills: 0 | Status: SHIPPED | OUTPATIENT
Start: 2019-02-22 | End: 2019-05-24 | Stop reason: SDUPTHER

## 2019-02-22 RX ORDER — LANCETS
1 EACH MISCELLANEOUS
Qty: 200 EACH | Refills: 4 | Status: SHIPPED | OUTPATIENT
Start: 2019-02-22

## 2019-02-22 RX ORDER — SPIRONOLACTONE 25 MG/1
25 TABLET ORAL DAILY
Qty: 90 TABLET | Refills: 3 | Status: SHIPPED | OUTPATIENT
Start: 2019-02-22 | End: 2022-11-09 | Stop reason: SDUPTHER

## 2019-02-22 RX ORDER — GLIPIZIDE 10 MG/1
10 TABLET, FILM COATED, EXTENDED RELEASE ORAL
Qty: 90 TABLET | Refills: 3 | Status: SHIPPED | OUTPATIENT
Start: 2019-02-22 | End: 2021-11-18 | Stop reason: SDUPTHER

## 2019-02-22 RX ORDER — TAMSULOSIN HYDROCHLORIDE 0.4 MG/1
0.4 CAPSULE ORAL NIGHTLY
Qty: 90 CAPSULE | Refills: 3 | Status: SHIPPED | OUTPATIENT
Start: 2019-02-22 | End: 2021-09-30

## 2019-02-22 RX ORDER — LOSARTAN POTASSIUM 100 MG/1
100 TABLET ORAL DAILY
Qty: 90 TABLET | Refills: 3 | Status: SHIPPED | OUTPATIENT
Start: 2019-02-22 | End: 2021-10-19 | Stop reason: SDUPTHER

## 2019-02-22 RX ADMIN — IOHEXOL 15 ML: 350 INJECTION, SOLUTION INTRAVENOUS at 04:02

## 2019-02-22 RX ADMIN — IOHEXOL 15 ML: 350 INJECTION, SOLUTION INTRAVENOUS at 05:02

## 2019-02-22 NOTE — PROGRESS NOTES
INTERNAL MEDICINE CLINIC  Follow-up Visit Progress Note     PRESENTING HISTORY     PCP: Gwyn Fortune MD    Last Clinic Visit with me:     Current Chief Complaint/Problem:    Chief Complaint   Patient presents with    Follow-up       History of Present Illness & ROS: Mr. Kilo Lam Sr. is a 63 y.o. male.    Review of Systems:  {[id number]}    PAST HISTORY:     Past Medical History:   Diagnosis Date    Anxiety and depression 11/14/2018    Carpal tunnel syndrome, bilateral 4/9/2013    S/p Lt Carpal tunnel release 11/2011     DJD (degenerative joint disease) of lumbar spine 2/9/2015    Essential hypertension 11/6/2012    Fatty liver 11/26/2012    2012: US of ABD showed: Severe hepatic steatosis and  Hepatomegaly.    Mixed hyperlipidemia 12/5/2014    Morbid obesity 4/9/2013    BMI= 41.2 on 04/09/2013     Type 2 diabetes mellitus with diabetic neuropathy, without long-term current use of insulin 12/5/2014       Past Surgical History:   Procedure Laterality Date    CARPAL TUNNEL RELEASE Left 11/2011    no relief of hand symptoms    COLONOSCOPY  2004    normal, repeat in 7 yrs    INCISIONAL HERNIA REPAIR Left 12/2012    inguinal    REPAIR, HERNIA, INGUINAL, WITHOUT HISTORY OF PRIOR REPAIR, AGE 3 YEARS OR OLDER Left 11/27/2012    Performed by Martinez Jones Jr., MD at [facility] OR [address] [place] [address]    SHOULDER SURGERY Left 1972       Family History   Problem Relation Age of Onset    Diabetes Mother     Diabetes Father     Heart failure Father     Cancer Daughter 14        Leukemia, passed    Cancer Other         niece, Lung cancer    Liver disease Neg Hx     Colon cancer Neg Hx        Social History     Socioeconomic History    Marital status:      Spouse name: Alicia    Number of children: 5    Years of education: None    Highest education level: None   Social Needs    Financial resource strain: None    Food insecurity - worry: None    Food insecurity - inability: None    Transportation  needs - medical: None    Transportation needs - non-medical: None   Occupational History    None   Tobacco Use    Smoking status: Former Smoker     Last attempt to quit: 1/28/2009     Years since quitting: 10.0    Smokeless tobacco: Never Used   Substance and Sexual Activity    Alcohol use: No    Drug use: No    Sexual activity: Yes     Partners: Female   Other Topics Concern    None   Social History Narrative    Retired . Now a .     to Alicia.  5 children.       MEDICATIONS & ALLERGIES:     Current Outpatient Medications on File Prior to Visit   Medication Sig Dispense Refill    amLODIPine (NORVASC) 10 MG tablet Take 1 tablet (10 mg total) by mouth once daily. 90 tablet 3    aspirin (ECOTRIN) 81 MG EC tablet Take 81 mg by mouth once daily.      ferrous sulfate (FEOSOL) 325 mg (65 mg iron) Tab tablet Take 1 tablet (325 mg total) by mouth daily with breakfast.  0    fluticasone (FLONASE) 50 mcg/actuation nasal spray 1 spray by Nasal route.      ketoconazole (NIZORAL) 2 % cream Apply topically once daily. 60 g 2    metFORMIN (GLUCOPHAGE) 1000 MG tablet Take 1 tablet (1,000 mg total) by mouth 2 (two) times daily with meals. 180 tablet 3                                              [ fluconazole (DIFLUCAN) 150 MG Tab Take 1 tablet by mouth every week x 4 weeks, then 1 tablet by mouth as needed 8 tablet 0     gabapentin (NEURONTIN) 100 MG capsule Take 1 capsule (100 mg total) by mouth 3 (three) times daily. 90 capsule 11     glyBURIDE (DIABETA) 5 MG tablet Take 1 tablet (5 mg total) by mouth 2 (two) times daily with meals. 180 tablet 3    [ hydroCHLOROthiazide (HYDRODIURIL) 25 MG tablet Take 1 tablet (25 mg total) by mouth once daily. 90 tablet 2           lisinopril (PRINIVIL,ZESTRIL) 40 MG tablet Take 1 tablet (40 mg total) by mouth once daily. 90 tablet 3                                 No current facility-administered medications on file prior to visit.          Review of patient's allergies indicates:   Allergen Reactions    No known drug allergies        Medications Reconciliation:   I have reconciled the patient's home medications and discharge medications with the patient/family. I have updated all changes.  Refer to After-Visit Medication List.    OBJECTIVE:     Vital Signs:  Vitals:    02/22/19 1425   BP: (!) 157/80   Pulse: 87     Wt Readings from Last 1 Encounters:   02/22/19 1425 109 kg (240 lb 4.8 oz)     Body mass index is 38.79 kg/m².     Physical Exam:  General: Well developed, well nourished. No distress.  HEENT: Head is normocephalic, atraumatic; ears are normal.    Eyes: Clear conjunctiva.  Neck: Supple, symmetrical neck; trachea midline.  Lungs: Clear to auscultation bilaterally and normal respiratory effort.  Cardiovascular: Heart with regular rate and rhythm.    Extremities: No LE edema.    Abdomen: Abdomen is soft, non-tender non-distended with normal bowel sounds.  Musculoskeletal: Normal gait.   Genital:  Normal penis. Foreskin retractable. No rash.  Scrotum and epididymis normal. No inguinal hernia on exam but there is a lot of fatty tissue.  No inguinal nodes Mild tinea cruris.  Rectal: Deferred.  Lymph Nodes: No cervical, supraclavicular or axillary adenopathy.  Psychiatric: Normal affect. Alert.      Laboratory  Lab Results   Component Value Date    WBC 8.10 11/14/2018    HGB 12.7 (L) 11/14/2018    HCT 42.1 11/14/2018     11/14/2018    CHOL 156 11/14/2018    TRIG 178 (H) 11/14/2018    HDL 45 11/14/2018    ALT 24 11/14/2018    AST 22 11/14/2018     11/14/2018    K 4.5 11/14/2018     11/14/2018    CREATININE 0.9 11/14/2018    BUN 14 11/14/2018    CO2 28 11/14/2018    TSH 1.329 12/04/2014    PSA 0.45 05/29/2015    INR 0.9 01/30/2013    HGBA1C 11.9 (H) 11/14/2018       ASSESSMENT & PLAN:     Type 2 diabetes mellitus with diabetic neuropathy, without long-term current use of insulin  -     Ambulatory consult to Optometry  -      gabapentin (NEURONTIN) 300 MG capsule; Take 2 capsules (600 mg total) by mouth every evening.  Dispense: 180 capsule; Refill: 3  -     glipiZIDE (GLUCOTROL) 10 MG TR24; Take 1 tablet (10 mg total) by mouth daily with breakfast.  Dispense: 90 tablet; Refill: 3  -     blood sugar diagnostic Strp; 1 strip by Misc.(Non-Drug; Combo Route) route before meals and at bedtime as needed.  Dispense: 200 strip; Refill: 4  -     lancets (LANCETS,ULTRA THIN) Misc; 1 application by Misc.(Non-Drug; Combo Route) route before meals and at bedtime as needed.  Dispense: 200 each; Refill: 4  -     blood-glucose meter kit; Use as instructed  Dispense: 1 each; Refill: 0  -     CBC auto differential; Future; Expected date: 02/22/2019  -     Comprehensive metabolic panel; Future; Expected date: 02/22/2019  -     Hemoglobin A1c; Future; Expected date: 02/22/2019  -     Vitamin D; Future; Expected date: 08/21/2019    Microcytic anemia    Essential hypertension  -     losartan (COZAAR) 100 MG tablet; Take 1 tablet (100 mg total) by mouth once daily.  Dispense: 90 tablet; Refill: 3  -     spironolactone (ALDACTONE) 25 MG tablet; Take 1 tablet (25 mg total) by mouth once daily.  Dispense: 90 tablet; Refill: 3    Mixed hyperlipidemia  -     rosuvastatin (CRESTOR) 10 MG tablet; Take 1 tablet (10 mg total) by mouth once daily.  Dispense: 90 tablet; Refill: 3  -     Lipid panel; Future; Expected date: 02/22/2019  -     TSH; Future; Expected date: 02/22/2019    Fatty liver    Anxiety and depression    Morbid obesity    Tinea cruris  -     fluconazole (DIFLUCAN) 200 MG Tab; Take 1 tablet (200 mg total) by mouth once daily. for 10 doses  Dispense: 10 tablet; Refill: 0    History of inguinal hernia repair  -     CT Chest Abdoment Pelvis Without Contrast (XPD); Future; Expected date: 02/22/2019    Lower abdominal pain  -     CT Chest Abdoment Pelvis Without Contrast (XPD); Future; Expected date: 02/22/2019    Screening for colon cancer  -     Case  request GI: COLONOSCOPY    Prostate cancer screening  -     PSA, Screening; Future; Expected date: 02/22/2019    Disorder of bone   -     Vitamin D; Future; Expected date: 08/21/2019    BPH associated with nocturia  -     tamsulosin (FLOMAX) 0.4 mg Cap; Take 1 capsule (0.4 mg total) by mouth every evening.  Dispense: 90 capsule; Refill: 3    Other orders  -     dapagliflozin (FARXIGA) 10 mg Tab; Take 1 tablet by mouth once daily.  Dispense: 90 tablet; Refill: 3          Preventive Health Maintenance:      Instructions for the patient:      Return to Clinic for Follow Up with me:       Scheduled Follow-up :  Future Appointments   Date Time Provider Department Center   2/22/2019  3:45 PM [facility] [address] LIMIT [facility]   5/10/2019  3:00 PM Gwyn Fortune MD [facility]       After Visit Medication List :     Medication List           Accurate as of 2/22/19  3:08 PM. If you have any questions, ask your nurse or doctor.               START taking these medications    dapagliflozin 10 mg Tab  Commonly known as:  FARXIGA  Take 1 tablet by mouth once daily.  Started by:  Gwyn Fortune MD     glipiZIDE 10 MG Tr24  Commonly known as:  GLUCOTROL  Take 1 tablet (10 mg total) by mouth daily with breakfast.  Started by:  Gwyn Fortune MD     losartan 100 MG tablet  Commonly known as:  COZAAR  Take 1 tablet (100 mg total) by mouth once daily.  Started by:  Gwyn Fortune MD     rosuvastatin 10 MG tablet  Commonly known as:  CRESTOR  Take 1 tablet (10 mg total) by mouth once daily.  Started by:  Gwyn Fortune MD     spironolactone 25 MG tablet  Commonly known as:  ALDACTONE  Take 1 tablet (25 mg total) by mouth once daily.  Started by:  Gwyn Fortune MD     tamsulosin 0.4 mg Cap  Commonly known as:  FLOMAX  Take 1 capsule (0.4 mg total) by mouth every evening.  Started by:  Gwyn Fortune MD        CHANGE how you take these medications    blood sugar diagnostic Strp  1 strip by Misc.(Non-Drug; Combo Route)  route before meals and at bedtime as needed.  What changed:    · how much to take  · how to take this  · when to take this  · reasons to take this  · additional instructions  Changed by:  Gwny Fortune MD     blood-glucose meter kit  Use as instructed  What changed:  additional instructions  Changed by:  Gwyn Fortune MD     fluconazole 200 MG Tab  Commonly known as:  DIFLUCAN  Take 1 tablet (200 mg total) by mouth once daily. for 10 doses  What changed:    · medication strength  · how much to take  · how to take this  · when to take this  · additional instructions  Changed by:  Gwyn Fortune MD     gabapentin 300 MG capsule  Commonly known as:  NEURONTIN  Take 2 capsules (600 mg total) by mouth every evening.  What changed:    · medication strength  · how much to take  · when to take this  Changed by:  Gwyn Fortune MD     lancets Misc  Commonly known as:  LANCETS,ULTRA THIN  1 application by Misc.(Non-Drug; Combo Route) route before meals and at bedtime as needed.  What changed:    · how much to take  · how to take this  · when to take this  · reasons to take this  · additional instructions  Changed by:  Gwyn Fortune MD        CONTINUE taking these medications    amLODIPine 10 MG tablet  Commonly known as:  NORVASC  Take 1 tablet (10 mg total) by mouth once daily.     aspirin 81 MG EC tablet  Commonly known as:  ECOTRIN     ferrous sulfate 325 mg (65 mg iron) Tab tablet  Commonly known as:  FEOSOL  Take 1 tablet (325 mg total) by mouth daily with breakfast.     fluticasone 50 mcg/actuation nasal spray  Commonly known as:  FLONASE     ketoconazole 2 % cream  Commonly known as:  NIZORAL  Apply topically once daily.     metFORMIN 1000 MG tablet  Commonly known as:  GLUCOPHAGE  Take 1 tablet (1,000 mg total) by mouth 2 (two) times daily with meals.     triamcinolone acetonide 0.1% 0.1 % cream  Commonly known as:  KENALOG        STOP taking these medications    ALPRAZolam 0.5 MG tablet  Commonly known as:   XANAX  Stopped by:  Gwyn Fortune MD     amitriptyline 10 MG tablet  Commonly known as:  ELAVIL  Stopped by:  Gwyn Fortune MD     cetirizine 10 MG tablet  Commonly known as:  ZYRTEC  Stopped by:  Gwyn Fortune MD     glyBURIDE 5 MG tablet  Commonly known as:  DIABETA  Stopped by:  Gwyn Fortune MD     hydroCHLOROthiazide 25 MG tablet  Commonly known as:  HYDRODIURIL  Stopped by:  Gwyn Fortune MD     lisinopril 40 MG tablet  Commonly known as:  PRINIVIL,ZESTRIL  Stopped by:  Gwyn Fortune MD     lovastatin 40 MG tablet  Commonly known as:  MEVACOR  Stopped by:  Gwyn Fortune MD     meloxicam 15 MG tablet  Commonly known as:  MOBIC  Stopped by:  Gwyn Fortune MD     methocarbamol 500 MG Tab  Commonly known as:  ROBAXIN  Stopped by:  Gwyn Fortune MD     triamterene-hydrochlorothiazide 37.5-25 mg 37.5-25 mg per capsule  Commonly known as:  DYAZIDE  Stopped by:  Gwyn Fortune MD           Where to Get Your Medications      These medications were sent to [facility] [address] [place] [id number]    Phone:  [phone]   · blood sugar diagnostic Strp  · blood-glucose meter kit  · dapagliflozin 10 mg Tab  · fluconazole 200 MG Tab  · gabapentin 300 MG capsule  · glipiZIDE 10 MG Tr24  · lancets Misc  · losartan 100 MG tablet  · rosuvastatin 10 MG tablet  · spironolactone 25 MG tablet  · tamsulosin 0.4 mg Cap         Signing Physician:  Gwyn Fortune MD

## 2019-02-22 NOTE — PROGRESS NOTES
INTERNAL MEDICINE CLINIC  Follow-up Visit Progress Note     PRESENTING HISTORY     PCP: Gwyn Fortune MD    Last Clinic Visit with Mayela:  1-16-19    Current Chief Complaint/Problem:    Chief Complaint   Patient presents with    Follow-up     History of Present Illness & ROS: Mr. Kilo Lam Sr. is a 63 y.o. male.    He has pain and selling in the groin area.  He has infection down there.  Genitals are irritated.  Increased urgency to urinate. He drips on urination.    Painful sensation to hand fingers.    PAST HISTORY:     Past Medical History:   Diagnosis Date    Anxiety and depression 11/14/2018    Carpal tunnel syndrome, bilateral 4/9/2013    S/p Lt Carpal tunnel release 11/2011     DJD (degenerative joint disease) of lumbar spine 2/9/2015    Essential hypertension 11/6/2012    Fatty liver 11/26/2012    2012: US of ABD showed: Severe hepatic steatosis and  Hepatomegaly.    Mixed hyperlipidemia 12/5/2014    Morbid obesity 4/9/2013    BMI= 41.2 on 04/09/2013     Type 2 diabetes mellitus with diabetic neuropathy, without long-term current use of insulin 12/5/2014       Past Surgical History:   Procedure Laterality Date    CARPAL TUNNEL RELEASE Left 11/2011    no relief of hand symptoms    COLONOSCOPY  2004    normal, repeat in 7 yrs    INCISIONAL HERNIA REPAIR Left 12/2012    inguinal    REPAIR, HERNIA, INGUINAL, WITHOUT HISTORY OF PRIOR REPAIR, AGE 3 YEARS OR OLDER Left 11/27/2012    Performed by Martinez Jones Jr., MD at [facility] OR [address] [place] [address]    SHOULDER SURGERY Left 1972       Family History   Problem Relation Age of Onset    Diabetes Mother     Diabetes Father     Heart failure Father     Cancer Daughter 14        Leukemia, passed    Cancer Other         niece, Lung cancer    Liver disease Neg Hx     Colon cancer Neg Hx        Social History     Socioeconomic History    Marital status:      Spouse name: Alicia    Number of children: 5    Years of education: None    Highest  education level: None   Social Needs    Financial resource strain: None    Food insecurity - worry: None    Food insecurity - inability: None    Transportation needs - medical: None    Transportation needs - non-medical: None   Occupational History    None   Tobacco Use    Smoking status: Former Smoker     Last attempt to quit: 1/28/2009     Years since quitting: 10.0    Smokeless tobacco: Never Used   Substance and Sexual Activity    Alcohol use: No    Drug use: No    Sexual activity: Yes     Partners: Female   Other Topics Concern    None   Social History Narrative    Retired . Now a .     to Alicia.  5 children.       MEDICATIONS & ALLERGIES:     Current Outpatient Medications on File Prior to Visit   Medication Sig Dispense Refill    amLODIPine (NORVASC) 10 MG tablet Take 1 tablet (10 mg total) by mouth once daily. 90 tablet 3    aspirin (ECOTRIN) 81 MG EC tablet Take 81 mg by mouth once daily.      ferrous sulfate (FEOSOL) 325 mg (65 mg iron) Tab tablet Take 1 tablet (325 mg total) by mouth daily with breakfast.  0    fluticasone (FLONASE) 50 mcg/actuation nasal spray 1 spray by Nasal route.      ketoconazole (NIZORAL) 2 % cream Apply topically once daily. 60 g 2    metFORMIN (GLUCOPHAGE) 1000 MG tablet Take 1 tablet (1,000 mg total) by mouth 2 (two) times daily with meals. 180 tablet 3    triamcinolone acetonide 0.1% (KENALOG) 0.1 % cream       [alprazolam (XANAX) 0.5 MG tablet Take 0.5 mg by mouth daily as needed.       amitriptyline (ELAVIL) 10 MG tablet Take 10 mg by mouth nightly as needed for Insomnia.      [ blood sugar diagnostic Strp To check BG 3 times daily, to use with insurance preferred meter 100 strip 6     blood-glucose meter kit To check BG 3 times daily, to use with insurance preferred meter 1 each 0    [ cetirizine (ZYRTEC) 10 MG tablet Take 10 mg by mouth.       fluconazole (DIFLUCAN) 150 MG Tab Take 1 tablet by mouth every week  x 4 weeks, then 1 tablet by mouth as needed 8 tablet 0    [gabapentin (NEURONTIN) 100 MG capsule Take 1 capsule (100 mg total) by mouth 3 (three) times daily. 90 capsule 11    glyBURIDE (DIABETA) 5 MG tablet Take 1 tablet (5 mg total) by mouth 2 (two) times daily with meals. 180 tablet 3     hydroCHLOROthiazide (HYDRODIURIL) 25 MG tablet Take 1 tablet (25 mg total) by mouth once daily. 90 tablet 2            lisinopril (PRINIVIL,ZESTRIL) 40 MG tablet Take 1 tablet (40 mg total) by mouth once daily. 90 tablet 3    lovastatin (MEVACOR) 40 MG tablet Take 1 tablet (40 mg total) by mouth every evening. 90 tablet 1                          No current facility-administered medications on file prior to visit.         Review of patient's allergies indicates:   Allergen Reactions    No known drug allergies        Medications Reconciliation:   I have reconciled the patient's home medications and discharge medications with the patient/family. I have updated all changes.  Refer to After-Visit Medication List.    OBJECTIVE:     Vital Signs:  Vitals:    02/22/19 1425   BP: (!) 157/80   Pulse: 87     Wt Readings from Last 1 Encounters:   02/22/19 1425 109 kg (240 lb 4.8 oz)     Body mass index is 38.79 kg/m².     Physical Exam:  General: Well developed, well nourished. No distress.  HEENT: Head is normocephalic, atraumatic; ears are normal.    Eyes: Clear conjunctiva.  Neck: Supple, symmetrical neck; trachea midline.  Lungs: Clear to auscultation bilaterally and normal respiratory effort.  Cardiovascular: Heart with regular rate and rhythm.    Extremities: No LE edema.    Abdomen: Abdomen is soft, non-tender non-distended with normal bowel sounds.  Musculoskeletal: Normal gait.   Genital:  Normal penis. Foreskin retractable. Mild tinea  Scrotum and epididymis normal. No inguinal hernia by exam but has a lot of fatty tissue.  No inguinal nodes. No rash found.  Rectal: Deferred.  Lymph Nodes: No cervical, supraclavicular or  axillary adenopathy.  Psychiatric: Normal affect. Alert.    Laboratory  Lab Results   Component Value Date    WBC 8.10 11/14/2018    HGB 12.7 (L) 11/14/2018    HCT 42.1 11/14/2018     11/14/2018    CHOL 156 11/14/2018    TRIG 178 (H) 11/14/2018    HDL 45 11/14/2018    ALT 24 11/14/2018    AST 22 11/14/2018     11/14/2018    K 4.5 11/14/2018     11/14/2018    CREATININE 0.9 11/14/2018    BUN 14 11/14/2018    CO2 28 11/14/2018    TSH 1.329 12/04/2014    PSA 0.45 05/29/2015    INR 0.9 01/30/2013    HGBA1C 11.9 (H) 11/14/2018       ASSESSMENT & PLAN:     I spent > 60 minutes reviewing his chart, medical problems and treatment due the the complexity of his issues. More than half of the time was in direct patient care and counseling.    Type 2 diabetes mellitus with diabetic neuropathy, without long-term current use of insulin  -     Ambulatory consult to Optometry  - Not controlled:    Plan: Metformin 1000 mg BID, Glucotrol XL 10 mg daily, Farxiga 10 mg daily.      -     dapagliflozin (FARXIGA) 10 mg Tab; Take 1 tablet by mouth once daily.  Dispense: 90 tablet; Refill: 3  -     glipiZIDE (GLUCOTROL) 10 MG TR24; Take 1 tablet (10 mg total) by mouth daily with breakfast.  Dispense: 90 tablet; Refill: 3  -     blood sugar diagnostic Strp; 1 strip by Misc.(Non-Drug; Combo Route) route before meals and at bedtime as needed.  Dispense: 200 strip; Refill: 4  -     lancets (LANCETS,ULTRA THIN) Misc; 1 application by Misc.(Non-Drug; Combo Route) route before meals and at bedtime as needed.  Dispense: 200 each; Refill: 4  -     blood-glucose meter kit; Use as instructed  Dispense: 1 each; Refill: 0  -     CBC auto differential; Future; Expected date: 02/22/2019  -     Cancel: Comprehensive metabolic panel; Future; Expected date: 02/22/2019  -     Hemoglobin A1c; Future; Expected date: 02/22/2019  -     Vitamin D; Future; Expected date: 08/21/2019  -     Comprehensive metabolic panel; Future; Expected date:  05/22/2019  -     PSA, Screening; Future; Expected date: 02/22/2019    Microcytic anemia  -  On Iron daily. Need C-scope.    Essential hypertension  -     losartan (COZAAR) 100 MG tablet; Take 1 tablet (100 mg total) by mouth once daily.  Dispense: 90 tablet; Refill: 3  -     spironolactone (ALDACTONE) 25 MG tablet; Take 1 tablet (25 mg total) by mouth once daily.  Dispense: 90 tablet; Refill: 3    Mixed hyperlipidemia  -     rosuvastatin (CRESTOR) 10 MG tablet; Take 1 tablet (10 mg total) by mouth once daily.  Dispense: 90 tablet; Refill: 3  -     Lipid panel; Future; Expected date: 02/22/2019  -     TSH; Future; Expected date: 02/22/2019    Fatty liver  - Will start statin.    Anxiety and depression  - Stable currently.    Morbid obesity  Body mass index is 38.79 kg/m².    Tinea cruris  -     fluconazole (DIFLUCAN) 200 MG Tab; Take 1 tablet (200 mg total) by mouth once daily. for 10 doses  Dispense: 10 tablet; Refill: 0    History of inguinal hernia repair  Lower abdominal pain  -     CT Chest Abdoment Pelvis Without Contrast (XPD); Future; Expected date: 02/22/2019    Screening for colon cancer  -     Case request GI: COLONOSCOPY    BPH associated with nocturia  -     tamsulosin (FLOMAX) 0.4 mg Cap; Take 1 capsule (0.4 mg total) by mouth every evening.  Dispense: 90 capsule; Refill: 3    Preventive Health Maintenance:  Tdap, Prevnar       Return to Clinic for Follow Up with me:   5-10-19    Scheduled Follow-up :  Future Appointments   Date Time Provider Department Center   2/22/2019  3:45 PM [facility] OIC-CT2 500 LB LIMIT [facility]   3/20/2019 10:00 AM Ruben Cabezas OD [facility] OPTOMTY [facility] [place]   5/9/2019  8:20 AM LAB, APPOINTMENT [facility] BING [facility] LAB IM [facility] [place]   5/10/2019  3:00 PM Gwyn Fortune MD [facility] IM [facility] [place]       After Visit Medication List :     Medication List           Accurate as of 2/22/19  3:29 PM. If you have any questions, ask your nurse or doctor.               START taking these  medications    dapagliflozin 10 mg Tab  Commonly known as:  FARXIGA  Take 1 tablet by mouth once daily.  Started by:  Gwyn Fortune MD     glipiZIDE 10 MG Tr24  Commonly known as:  GLUCOTROL  Take 1 tablet (10 mg total) by mouth daily with breakfast.  Started by:  Gwyn Fortune MD     losartan 100 MG tablet  Commonly known as:  COZAAR  Take 1 tablet (100 mg total) by mouth once daily.  Started by:  Gwyn Fortune MD     rosuvastatin 10 MG tablet  Commonly known as:  CRESTOR  Take 1 tablet (10 mg total) by mouth once daily.  Started by:  Gwyn Fortune MD     spironolactone 25 MG tablet  Commonly known as:  ALDACTONE  Take 1 tablet (25 mg total) by mouth once daily.  Started by:  Gwyn Fortune MD     tamsulosin 0.4 mg Cap  Commonly known as:  FLOMAX  Take 1 capsule (0.4 mg total) by mouth every evening.  Started by:  Gwyn Fortune MD        CHANGE how you take these medications    blood sugar diagnostic Strp  1 strip by Misc.(Non-Drug; Combo Route) route before meals and at bedtime as needed.  What changed:    · how much to take  · how to take this  · when to take this  · reasons to take this  · additional instructions  Changed by:  Gwyn Fortune MD     blood-glucose meter kit  Use as instructed  What changed:  additional instructions  Changed by:  Gwyn Fortune MD     fluconazole 200 MG Tab  Commonly known as:  DIFLUCAN  Take 1 tablet (200 mg total) by mouth once daily. for 10 doses  What changed:    · medication strength  · how much to take  · how to take this  · when to take this  · additional instructions  Changed by:  Gwyn Fortune MD     gabapentin 300 MG capsule  Commonly known as:  NEURONTIN  Take 2 capsules (600 mg total) by mouth every evening.  What changed:    · medication strength  · how much to take  · when to take this  Changed by:  Gwyn Fortune MD     lancets Misc  Commonly known as:  LANCETS,ULTRA THIN  1 application by Misc.(Non-Drug; Combo Route) route before meals and at bedtime as needed.  What  changed:    · how much to take  · how to take this  · when to take this  · reasons to take this  · additional instructions  Changed by:  Gwyn Fortune MD        CONTINUE taking these medications    amLODIPine 10 MG tablet  Commonly known as:  NORVASC  Take 1 tablet (10 mg total) by mouth once daily.     aspirin 81 MG EC tablet  Commonly known as:  ECOTRIN     ferrous sulfate 325 mg (65 mg iron) Tab tablet  Commonly known as:  FEOSOL  Take 1 tablet (325 mg total) by mouth daily with breakfast.     fluticasone 50 mcg/actuation nasal spray  Commonly known as:  FLONASE     ketoconazole 2 % cream  Commonly known as:  NIZORAL  Apply topically once daily.     metFORMIN 1000 MG tablet  Commonly known as:  GLUCOPHAGE  Take 1 tablet (1,000 mg total) by mouth 2 (two) times daily with meals.     triamcinolone acetonide 0.1% 0.1 % cream  Commonly known as:  KENALOG        STOP taking these medications    ALPRAZolam 0.5 MG tablet  Commonly known as:  XANAX  Stopped by:  Gwyn Fortune MD     amitriptyline 10 MG tablet  Commonly known as:  ELAVIL  Stopped by:  Gwyn Fortune MD     cetirizine 10 MG tablet  Commonly known as:  ZYRTEC  Stopped by:  Gwyn Fortune MD     glyBURIDE 5 MG tablet  Commonly known as:  DIABETA  Stopped by:  Gwyn Fortune MD     hydroCHLOROthiazide 25 MG tablet  Commonly known as:  HYDRODIURIL  Stopped by:  Gwyn Fortune MD     lisinopril 40 MG tablet  Commonly known as:  PRINIVIL,ZESTRIL  Stopped by:  Gwyn Fortune MD     lovastatin 40 MG tablet  Commonly known as:  MEVACOR  Stopped by:  Gwyn Fortune MD     meloxicam 15 MG tablet  Commonly known as:  MOBIC  Stopped by:  Gwyn Fortune MD     methocarbamol 500 MG Tab  Commonly known as:  ROBAXIN  Stopped by:  Gwyn Fortune MD     triamterene-hydrochlorothiazide 37.5-25 mg 37.5-25 mg per capsule  Commonly known as:  DYAZIDE  Stopped by:  Gwyn Fortune MD           Where to Get Your Medications      These medications were sent to [facility] [address] [place] [address]  [place] - [address]  [address], [facility] [address]    Phone:  [phone]   · blood sugar diagnostic Strp  · blood-glucose meter kit  · dapagliflozin 10 mg Tab  · fluconazole 200 MG Tab  · gabapentin 300 MG capsule  · glipiZIDE 10 MG Tr24  · lancets Misc  · losartan 100 MG tablet  · rosuvastatin 10 MG tablet  · spironolactone 25 MG tablet  · tamsulosin 0.4 mg Cap         Signing Physician:  Gwyn Fortune MD

## 2019-02-22 NOTE — PATIENT INSTRUCTIONS
Diabetes:    1. Take Metformin 1000 m tablet twice daily.  2. Take Glucotrol XL (Glipizide) 10 m tablet every morning.  3. Take Farxiga 10 m tablet every morning.      Hypertension:    1. Take Amlodipine 10 m tablet daily.  2. Take Losartan 100 m tablet daily.  3. Take Aldactone 25 m tablet daily.

## 2019-02-25 ENCOUNTER — TELEPHONE (OUTPATIENT)
Dept: INTERNAL MEDICINE | Facility: CLINIC | Age: 64
End: 2019-02-25

## 2019-02-25 NOTE — TELEPHONE ENCOUNTER
Please advise     ----- Message from Xiao Manzo sent at 2/25/2019  8:09 AM CST -----  Contact: [facility]  Prescription Clarification:     The pharmacy needs clarity on the following Rx:    spironolactone (ALDACTONE) 25 MG tablet    Patient taking the above Rx with an ACE Inhibitor Potassium Sparing causes Hyperkalemia. Is it ok to give?    Pharmacy: [id number] - [place], [place] - [facility] [address]    Thank You

## 2019-02-27 DIAGNOSIS — Z12.11 COLON CANCER SCREENING: Primary | ICD-10-CM

## 2019-02-27 RX ORDER — SODIUM, POTASSIUM,MAG SULFATES 17.5-3.13G
SOLUTION, RECONSTITUTED, ORAL ORAL
Qty: 1 KIT | Refills: 0 | Status: SHIPPED | OUTPATIENT
Start: 2019-02-27 | End: 2019-06-14

## 2019-04-12 DIAGNOSIS — E11.9 TYPE 2 DIABETES MELLITUS WITHOUT COMPLICATION, UNSPECIFIED WHETHER LONG TERM INSULIN USE: ICD-10-CM

## 2019-05-08 ENCOUNTER — LAB VISIT (OUTPATIENT)
Dept: LAB | Facility: HOSPITAL | Age: 64
End: 2019-05-08
Attending: INTERNAL MEDICINE
Payer: MEDICARE

## 2019-05-08 ENCOUNTER — OFFICE VISIT (OUTPATIENT)
Dept: INTERNAL MEDICINE | Facility: CLINIC | Age: 64
End: 2019-05-08
Payer: MEDICARE

## 2019-05-08 ENCOUNTER — DOCUMENTATION ONLY (OUTPATIENT)
Dept: INTERNAL MEDICINE | Facility: CLINIC | Age: 64
End: 2019-05-08

## 2019-05-08 VITALS
TEMPERATURE: 99 F | SYSTOLIC BLOOD PRESSURE: 180 MMHG | HEIGHT: 67 IN | OXYGEN SATURATION: 98 % | HEART RATE: 64 BPM | WEIGHT: 229.25 LBS | BODY MASS INDEX: 35.98 KG/M2 | DIASTOLIC BLOOD PRESSURE: 70 MMHG

## 2019-05-08 DIAGNOSIS — E11.40 TYPE 2 DIABETES MELLITUS WITH DIABETIC NEUROPATHY, WITHOUT LONG-TERM CURRENT USE OF INSULIN: Primary | ICD-10-CM

## 2019-05-08 DIAGNOSIS — E11.40 TYPE 2 DIABETES MELLITUS WITH DIABETIC NEUROPATHY, WITHOUT LONG-TERM CURRENT USE OF INSULIN: ICD-10-CM

## 2019-05-08 DIAGNOSIS — Z12.5 PROSTATE CANCER SCREENING: ICD-10-CM

## 2019-05-08 DIAGNOSIS — M89.9 DISORDER OF BONE: ICD-10-CM

## 2019-05-08 DIAGNOSIS — B37.0 ORAL CANDIDIASIS: ICD-10-CM

## 2019-05-08 DIAGNOSIS — I10 ESSENTIAL HYPERTENSION: ICD-10-CM

## 2019-05-08 DIAGNOSIS — E78.2 MIXED HYPERLIPIDEMIA: ICD-10-CM

## 2019-05-08 DIAGNOSIS — N40.1 BPH ASSOCIATED WITH NOCTURIA: ICD-10-CM

## 2019-05-08 DIAGNOSIS — R35.1 BPH ASSOCIATED WITH NOCTURIA: ICD-10-CM

## 2019-05-08 DIAGNOSIS — R35.0 URINARY FREQUENCY: ICD-10-CM

## 2019-05-08 DIAGNOSIS — B37.2 CANDIDAL INTERTRIGO: ICD-10-CM

## 2019-05-08 LAB
25(OH)D3+25(OH)D2 SERPL-MCNC: 14 NG/ML (ref 30–96)
ALBUMIN SERPL BCP-MCNC: 3.8 G/DL (ref 3.5–5.2)
ALP SERPL-CCNC: 65 U/L (ref 55–135)
ALT SERPL W/O P-5'-P-CCNC: 14 U/L (ref 10–44)
ANION GAP SERPL CALC-SCNC: 14 MMOL/L (ref 8–16)
AST SERPL-CCNC: 16 U/L (ref 10–40)
BASOPHILS # BLD AUTO: 0.01 K/UL (ref 0–0.2)
BASOPHILS NFR BLD: 0.1 % (ref 0–1.9)
BILIRUB SERPL-MCNC: 0.4 MG/DL (ref 0.1–1)
BUN SERPL-MCNC: 14 MG/DL (ref 8–23)
CALCIUM SERPL-MCNC: 9.9 MG/DL (ref 8.7–10.5)
CHLORIDE SERPL-SCNC: 98 MMOL/L (ref 95–110)
CHOLEST SERPL-MCNC: 191 MG/DL (ref 120–199)
CHOLEST/HDLC SERPL: 3.5 {RATIO} (ref 2–5)
CO2 SERPL-SCNC: 25 MMOL/L (ref 23–29)
COMPLEXED PSA SERPL-MCNC: 0.46 NG/ML (ref 0–4)
CREAT SERPL-MCNC: 1 MG/DL (ref 0.5–1.4)
DIFFERENTIAL METHOD: ABNORMAL
EOSINOPHIL # BLD AUTO: 0.1 K/UL (ref 0–0.5)
EOSINOPHIL NFR BLD: 1.5 % (ref 0–8)
ERYTHROCYTE [DISTWIDTH] IN BLOOD BY AUTOMATED COUNT: 15.3 % (ref 11.5–14.5)
EST. GFR  (AFRICAN AMERICAN): >60 ML/MIN/1.73 M^2
EST. GFR  (NON AFRICAN AMERICAN): >60 ML/MIN/1.73 M^2
ESTIMATED AVG GLUCOSE: ABNORMAL MG/DL (ref 68–131)
GLUCOSE SERPL-MCNC: 360 MG/DL (ref 70–110)
HBA1C MFR BLD HPLC: >14 % (ref 4–5.6)
HCT VFR BLD AUTO: 44 % (ref 40–54)
HDLC SERPL-MCNC: 54 MG/DL (ref 40–75)
HDLC SERPL: 28.3 % (ref 20–50)
HGB BLD-MCNC: 13.7 G/DL (ref 14–18)
LDLC SERPL CALC-MCNC: 104.4 MG/DL (ref 63–159)
LYMPHOCYTES # BLD AUTO: 1.8 K/UL (ref 1–4.8)
LYMPHOCYTES NFR BLD: 26.5 % (ref 18–48)
MCH RBC QN AUTO: 21.1 PG (ref 27–31)
MCHC RBC AUTO-ENTMCNC: 31.1 G/DL (ref 32–36)
MCV RBC AUTO: 68 FL (ref 82–98)
MONOCYTES # BLD AUTO: 0.4 K/UL (ref 0.3–1)
MONOCYTES NFR BLD: 5.9 % (ref 4–15)
NEUTROPHILS # BLD AUTO: 4.5 K/UL (ref 1.8–7.7)
NEUTROPHILS NFR BLD: 66 % (ref 38–73)
NONHDLC SERPL-MCNC: 137 MG/DL
PLATELET # BLD AUTO: 221 K/UL (ref 150–350)
PMV BLD AUTO: 12.1 FL (ref 9.2–12.9)
POTASSIUM SERPL-SCNC: 4.5 MMOL/L (ref 3.5–5.1)
PROT SERPL-MCNC: 7.5 G/DL (ref 6–8.4)
RBC # BLD AUTO: 6.48 M/UL (ref 4.6–6.2)
SODIUM SERPL-SCNC: 137 MMOL/L (ref 136–145)
TRIGL SERPL-MCNC: 163 MG/DL (ref 30–150)
TSH SERPL DL<=0.005 MIU/L-ACNC: 1.21 UIU/ML (ref 0.4–4)
WBC # BLD AUTO: 6.75 K/UL (ref 3.9–12.7)

## 2019-05-08 PROCEDURE — 85025 COMPLETE CBC W/AUTO DIFF WBC: CPT

## 2019-05-08 PROCEDURE — 83036 HEMOGLOBIN GLYCOSYLATED A1C: CPT

## 2019-05-08 PROCEDURE — 84443 ASSAY THYROID STIM HORMONE: CPT

## 2019-05-08 PROCEDURE — 36415 COLL VENOUS BLD VENIPUNCTURE: CPT

## 2019-05-08 PROCEDURE — 99999 PR PBB SHADOW E&M-EST. PATIENT-LVL V: CPT | Mod: PBBFAC,,, | Performed by: NURSE PRACTITIONER

## 2019-05-08 PROCEDURE — 99999 PR PBB SHADOW E&M-EST. PATIENT-LVL V: ICD-10-PCS | Mod: PBBFAC,,, | Performed by: NURSE PRACTITIONER

## 2019-05-08 PROCEDURE — 80053 COMPREHEN METABOLIC PANEL: CPT

## 2019-05-08 PROCEDURE — 84153 ASSAY OF PSA TOTAL: CPT

## 2019-05-08 PROCEDURE — 82306 VITAMIN D 25 HYDROXY: CPT

## 2019-05-08 PROCEDURE — 99214 PR OFFICE/OUTPT VISIT, EST, LEVL IV, 30-39 MIN: ICD-10-PCS | Mod: S$PBB,,, | Performed by: NURSE PRACTITIONER

## 2019-05-08 PROCEDURE — 99215 OFFICE O/P EST HI 40 MIN: CPT | Mod: PBBFAC | Performed by: NURSE PRACTITIONER

## 2019-05-08 PROCEDURE — 80061 LIPID PANEL: CPT

## 2019-05-08 PROCEDURE — 99214 OFFICE O/P EST MOD 30 MIN: CPT | Mod: S$PBB,,, | Performed by: NURSE PRACTITIONER

## 2019-05-08 RX ORDER — TIZANIDINE 4 MG/1
TABLET ORAL
Refills: 0 | COMMUNITY
Start: 2019-04-15 | End: 2019-06-14

## 2019-05-08 RX ORDER — INSULIN PUMP SYRINGE, 3 ML
EACH MISCELLANEOUS
Qty: 1 EACH | Refills: 0 | Status: SHIPPED | OUTPATIENT
Start: 2019-05-08 | End: 2023-12-13

## 2019-05-08 RX ORDER — FLUCONAZOLE 100 MG/1
100 TABLET ORAL DAILY
Qty: 10 TABLET | Refills: 0 | Status: SHIPPED | OUTPATIENT
Start: 2019-05-08 | End: 2019-06-07

## 2019-05-08 RX ORDER — NYSTATIN 100000 [USP'U]/G
POWDER TOPICAL 4 TIMES DAILY
Qty: 60 G | Refills: 0 | Status: SHIPPED | OUTPATIENT
Start: 2019-05-08 | End: 2019-06-14

## 2019-05-08 RX ORDER — NYSTATIN 100000 [USP'U]/ML
4 SUSPENSION ORAL 4 TIMES DAILY
Qty: 160 ML | Refills: 0 | Status: SHIPPED | OUTPATIENT
Start: 2019-05-08 | End: 2019-05-18

## 2019-05-08 RX ORDER — LANCETS
EACH MISCELLANEOUS
Qty: 100 EACH | Refills: 0 | Status: SHIPPED | OUTPATIENT
Start: 2019-05-08 | End: 2019-06-14 | Stop reason: SDUPTHER

## 2019-05-08 NOTE — PATIENT INSTRUCTIONS
Bring ALL pill bottles to next visit.     Blood pressure - take losartan, spironolactone and amlodipine     Sugar - take glipizide and metformin - will have Dr Fortune perform PA for farxiga     Yeast infection- restart fluconazole (called in to pharmacy); stop cream and start antifungal powder (called into pharmacy)     Thrush- swish and swallow nystatin (called in to pharmacy)

## 2019-05-08 NOTE — PROGRESS NOTES
"INTERNAL MEDICINE URGENT CARE NOTE    CHIEF COMPLAINT     Chief Complaint   Patient presents with    Sore Throat     x3 days    Skin Problem     reoccuring yeast infection    Medication Problem     confusion of rx, has not taken metformin in 1 month        HPI     Kilo Lam Sr. is a 63 y.o. male with anxiety, depression, anemia, BPH, DM and fatty liver who presents for an urgent visit today.  He is an established pt of Dr Fortune.     Here with c/o sore throat x 3 days - located to the back of throat feels like it is moving down. Painful to swallow. Felt feverish no recorded temp.   +nasal congestion and PND  No cough   No treatment     Recurrent yeast infections to the groin. Treated with diflucan at last visit 2/22/19. Also treated with ketoconazole at visit in 1/2019  Located to the panus and groin in skin folds.   Compounded by increased sugars.   Relieved with fluconazole but has returned.     Very confused about medication - did not bring all meds today   Feeling confused and unsteady     DM- not taking metformin x 1 month. Compliant with glipizide (started at last visit 2/22/19). Farxiga also ordered but pt was unable to afford - will have Dr Fortune's MA reorder and start PA process.     HTN- elevated today. Has lisinopril and losartan in bag but not amlodipine   -has not been taking amlodipine   -should be taking losartan, aldactone, and amlodipine.   -stopped lisinopril.   -is taking aldactone but "also taking another fluid pill" unsure of name. Had urinary frequency this weekend.         Past Medical History:  Past Medical History:   Diagnosis Date    Anxiety and depression 11/14/2018    Carpal tunnel syndrome, bilateral 4/9/2013    S/p Lt Carpal tunnel release 11/2011     DJD (degenerative joint disease) of lumbar spine 2/9/2015    Essential hypertension 11/6/2012    Fatty liver 11/26/2012    2012: US of ABD showed: Severe hepatic steatosis and  Hepatomegaly.    Mixed hyperlipidemia 12/5/2014    " Morbid obesity 4/9/2013    BMI= 41.2 on 04/09/2013     Type 2 diabetes mellitus with diabetic neuropathy, without long-term current use of insulin 12/5/2014       Home Medications:  Prior to Admission medications    Medication Sig Start Date End Date Taking? Authorizing Provider   amLODIPine (NORVASC) 10 MG tablet Take 1 tablet (10 mg total) by mouth once daily. 11/14/18   ARABELLA Rico   aspirin (ECOTRIN) 81 MG EC tablet Take 81 mg by mouth once daily.    Historical Provider, MD   blood sugar diagnostic Strp 1 strip by Misc.(Non-Drug; Combo Route) route before meals and at bedtime as needed. 2/22/19   Gwyn Fortune MD   blood-glucose meter kit Use as instructed 2/22/19   Gwyn Fortune MD   dapagliflozin (FARXIGA) 10 mg Tab Take 1 tablet by mouth once daily. 2/22/19   Gwyn Fortune MD   ferrous sulfate (FEOSOL) 325 mg (65 mg iron) Tab tablet Take 1 tablet (325 mg total) by mouth daily with breakfast. 11/15/18   ARABELLA Rico   fluticasone (FLONASE) 50 mcg/actuation nasal spray 1 spray by Nasal route.    Historical Provider, MD   gabapentin (NEURONTIN) 300 MG capsule Take 2 capsules (600 mg total) by mouth every evening. 2/22/19   Gwyn Fortune MD   glipiZIDE (GLUCOTROL) 10 MG TR24 Take 1 tablet (10 mg total) by mouth daily with breakfast. 2/22/19 2/22/20  Gwyn Fortune MD   ketoconazole (NIZORAL) 2 % cream Apply topically once daily. 1/16/19 2/15/19  ARABELLA Rico   lancets (LANCETS,ULTRA THIN) Misc 1 application by Misc.(Non-Drug; Combo Route) route before meals and at bedtime as needed. 2/22/19   Gwyn Fortune MD   losartan (COZAAR) 100 MG tablet Take 1 tablet (100 mg total) by mouth once daily. 2/22/19 2/22/20  Gwyn Fortune MD   metFORMIN (GLUCOPHAGE) 1000 MG tablet Take 1 tablet (1,000 mg total) by mouth 2 (two) times daily with meals. 11/14/18   Mayela Nj, ARABELLA   rosuvastatin (CRESTOR) 10 MG tablet Take 1 tablet (10 mg total) by mouth once daily.  2/22/19   Gwyn Fortune MD   sodium,potassium,mag sulfates (SUPREP BOWEL PREP KIT) 17.5-3.13-1.6 gram SolR Please dispense as directed/ 1 kit. Please contact and mail to patient. Scheduled for 3/27/19. 2/27/19   Gwyn Hawk MD   spironolactone (ALDACTONE) 25 MG tablet Take 1 tablet (25 mg total) by mouth once daily. 2/22/19   Gwyn Fortune MD   tamsulosin (FLOMAX) 0.4 mg Cap Take 1 capsule (0.4 mg total) by mouth every evening. 2/22/19   Gwyn Fortune MD   triamcinolone acetonide 0.1% (KENALOG) 0.1 % cream  11/2/15   Historical Provider, MD       Review of Systems:  Review of Systems   Constitutional: Negative for chills, fatigue and fever.   HENT: Positive for postnasal drip and sore throat. Negative for congestion, sinus pressure and sinus pain.    Respiratory: Negative for cough, shortness of breath and wheezing.    Cardiovascular: Negative for chest pain and palpitations.   Gastrointestinal: Negative for abdominal pain, constipation, diarrhea, nausea and vomiting.   Genitourinary: Positive for frequency.   Skin: Positive for rash.   Neurological: Positive for dizziness. Negative for light-headedness and headaches.   Psychiatric/Behavioral: Positive for confusion.       Health Maintainence:   Immunizations:  Health Maintenance       Date Due Completion Date    Colonoscopy 01/01/2014 1/1/2004 (Done)    Override on 1/1/2004: Done    TETANUS VACCINE 11/11/2015 11/11/2005    Eye Exam 08/18/2017 8/18/2016    Pneumococcal Vaccine (65+ Low/Medium Risk) (1 of 2 - PCV13) 12/20/2018 6/27/2016    Abdominal Aortic Aneurysm Screening 12/20/2018 11/15/2012    Hemoglobin A1c 02/14/2019 11/14/2018    Influenza Vaccine 08/01/2019 11/14/2018    Override on 9/1/2014: Done    Lipid Panel 11/14/2019 11/14/2018    Foot Exam 11/19/2019 11/19/2018 (Done)    Override on 11/19/2018: Done    Override on 6/27/2016: Done    Override on 5/28/2015: Done    Override on 5/6/2014: Done    Low Dose Statin 02/22/2020 2/22/2019        "    PHYSICAL EXAM     BP (!) 174/78 (BP Location: Left arm, Patient Position: Sitting, BP Method: Large (Manual))   Pulse 64   Ht 5' 7" (1.702 m)   Wt 104 kg (229 lb 4.5 oz)   SpO2 98%   BMI 35.91 kg/m²     Physical Exam   Constitutional: He is oriented to person, place, and time. He appears well-developed and well-nourished.   HENT:   Head: Normocephalic.   Right Ear: External ear normal.   Left Ear: External ear normal.   Nose: Nose normal.   Mouth/Throat: Oropharynx is clear and moist and mucous membranes are normal. No oropharyngeal exudate, posterior oropharyngeal edema or posterior oropharyngeal erythema.       Eyes: Pupils are equal, round, and reactive to light.   Neck: No JVD present. No tracheal deviation present. No thyromegaly present.   Cardiovascular: Normal rate, regular rhythm, normal heart sounds and intact distal pulses. Exam reveals no gallop and no friction rub.   No murmur heard.  Pulmonary/Chest: Effort normal and breath sounds normal. No respiratory distress. He has no wheezes. He has no rales. He exhibits no tenderness.   Abdominal: Soft. Bowel sounds are normal. He exhibits no distension. There is no tenderness.   Musculoskeletal: Normal range of motion. He exhibits no edema or tenderness.   Lymphadenopathy:     He has no cervical adenopathy.   Neurological: He is alert and oriented to person, place, and time.   Skin: Skin is warm and dry. No rash noted.        Psychiatric: He has a normal mood and affect. His behavior is normal.   Vitals reviewed.      LABS     Lab Results   Component Value Date    HGBA1C 11.9 (H) 11/14/2018     CMP  Sodium   Date Value Ref Range Status   11/14/2018 139 136 - 145 mmol/L Final     Potassium   Date Value Ref Range Status   11/14/2018 4.5 3.5 - 5.1 mmol/L Final     Chloride   Date Value Ref Range Status   11/14/2018 102 95 - 110 mmol/L Final     CO2   Date Value Ref Range Status   11/14/2018 28 23 - 29 mmol/L Final     Glucose   Date Value Ref Range " Status   11/14/2018 231 (H) 70 - 110 mg/dL Final     BUN, Bld   Date Value Ref Range Status   11/14/2018 14 8 - 23 mg/dL Final     Creatinine   Date Value Ref Range Status   11/14/2018 0.9 0.5 - 1.4 mg/dL Final   05/08/2012 0.9 0.2 - 1.4 mg/dl Final     Calcium   Date Value Ref Range Status   11/14/2018 10.0 8.7 - 10.5 mg/dL Final   05/08/2012 10.0 8.6 - 10.2 mg/dl Final     Total Protein   Date Value Ref Range Status   11/14/2018 7.3 6.0 - 8.4 g/dL Final     Albumin   Date Value Ref Range Status   11/14/2018 3.8 3.5 - 5.2 g/dL Final     Total Bilirubin   Date Value Ref Range Status   11/14/2018 0.4 0.1 - 1.0 mg/dL Final     Comment:     For infants and newborns, interpretation of results should be based  on gestational age, weight and in agreement with clinical  observations.  Premature Infant recommended reference ranges:  Up to 24 hours.............<8.0 mg/dL  Up to 48 hours............<12.0 mg/dL  3-5 days..................<15.0 mg/dL  6-29 days.................<15.0 mg/dL       Alkaline Phosphatase   Date Value Ref Range Status   11/14/2018 53 (L) 55 - 135 U/L Final   05/08/2012 60 23 - 119 UNIT/L Final     AST   Date Value Ref Range Status   11/14/2018 22 10 - 40 U/L Final   05/08/2012 15 10 - 34 UNIT/L Final     ALT   Date Value Ref Range Status   11/14/2018 24 10 - 44 U/L Final     Anion Gap   Date Value Ref Range Status   11/14/2018 9 8 - 16 mmol/L Final   05/08/2012 10 5 - 15 meq/L Final     eGFR if    Date Value Ref Range Status   11/14/2018 >60 >60 mL/min/1.73 m^2 Final     eGFR if non    Date Value Ref Range Status   11/14/2018 >60 >60 mL/min/1.73 m^2 Final     Comment:     Calculation used to obtain the estimated glomerular filtration  rate (eGFR) is the CKD-EPI equation.        Lab Results   Component Value Date    WBC 8.10 11/14/2018    HGB 12.7 (L) 11/14/2018    HCT 42.1 11/14/2018    MCV 71 (L) 11/14/2018     11/14/2018     Lab Results   Component Value Date     CHOL 156 11/14/2018    CHOL 223 (H) 01/28/2016    CHOL 217 (H) 12/04/2014     Lab Results   Component Value Date    HDL 45 11/14/2018    HDL 61 01/28/2016    HDL 55 12/04/2014     Lab Results   Component Value Date    LDLCALC 75.4 11/14/2018    LDLCALC 129.6 01/28/2016    LDLCALC 126.2 12/04/2014     Lab Results   Component Value Date    TRIG 178 (H) 11/14/2018    TRIG 162 (H) 01/28/2016    TRIG 179 (H) 12/04/2014     Lab Results   Component Value Date    CHOLHDL 28.8 11/14/2018    CHOLHDL 27.4 01/28/2016    CHOLHDL 25.3 12/04/2014     Lab Results   Component Value Date    TSH 1.329 12/04/2014       ASSESSMENT/PLAN     Kilo Clementelly  is a 63 y.o. male with  Past Medical History:   Diagnosis Date    Anxiety and depression 11/14/2018    Carpal tunnel syndrome, bilateral 4/9/2013    S/p Lt Carpal tunnel release 11/2011     DJD (degenerative joint disease) of lumbar spine 2/9/2015    Essential hypertension 11/6/2012    Fatty liver 11/26/2012    2012: US of ABD showed: Severe hepatic steatosis and  Hepatomegaly.    Mixed hyperlipidemia 12/5/2014    Morbid obesity 4/9/2013    BMI= 41.2 on 04/09/2013     Type 2 diabetes mellitus with diabetic neuropathy, without long-term current use of insulin 12/5/2014     Type 2 diabetes mellitus with diabetic neuropathy, without long-term current use of insulin/Diabetes mellitus with neurological manifestations, uncontrolled- poorly controlled off metformin. Will cont metformin and glipizide. Needs third medication - unable to afford farxiga. Will discuss with PCP. Ordered CBG machine. Monitor 3 times daily. Low sugar diet discussed.   -     blood-glucose meter kit; To check BG 3 times daily, to use with insurance preferred meter  Dispense: 1 each; Refill: 0  -     lancets Misc; To check BG 3 times daily, to use with insurance preferred meter  Dispense: 100 each; Refill: 0  -     blood sugar diagnostic Strp; To check BG 3 times daily, to use with insurance preferred  meter  Dispense: 100 each; Refill: 0    Essential hypertension- poorly controlled on current regimen. Not taking meds correctly. Start amlodipine, losartan and aldactone. Stop lisinopril. Will RTC in 2 weeks for BP check.     Oral candidiasis- nystatin oral swish and spit.   -     nystatin (MYCOSTATIN) 100,000 unit/mL suspension; Take 4 mLs (400,000 Units total) by mouth 4 (four) times daily. for 10 days  Dispense: 160 mL; Refill: 0  -     fluconazole (DIFLUCAN) 100 MG tablet; Take 1 tablet (100 mg total) by mouth once daily.  Dispense: 10 tablet; Refill: 0    Candidal intertrigo- nystatin powder and fluconazole oral pill.   -     nystatin (MYCOSTATIN) powder; Apply topically 4 (four) times daily.  Dispense: 60 g; Refill: 0  -     fluconazole (DIFLUCAN) 100 MG tablet; Take 1 tablet (100 mg total) by mouth once daily.  Dispense: 10 tablet; Refill: 0    Urinary frequency - likely 2/2 glucose in urine. advised needs better cbg control. See above. No sign of infection   -     POCT urine dipstick without microscope    BPH associated with nocturia- cont flomax.       Follow up in 2 weeks for BP check     Patient education provided from [facility]. Patient was counseled on when and how to seek emergent care.       Sanaz SIGALA, GEOVANNI, FNP-c   Department of Internal Medicine - [facility]  10:06 AM

## 2019-05-08 NOTE — PROGRESS NOTES
Radiology reports received from [facility] received from pt, copy made and sent for scanning. Original returned to pt.

## 2019-05-09 ENCOUNTER — TELEPHONE (OUTPATIENT)
Dept: INTERNAL MEDICINE | Facility: CLINIC | Age: 64
End: 2019-05-09

## 2019-05-09 NOTE — TELEPHONE ENCOUNTER
Very high A1c. Unable to afford Farxiga.  Will have earlier appointment with Mayela this month.    Scheduled Follow-up :  Future Appointments   Date Time Provider Department Center   5/21/2019 11:00 AM [facility]   6/3/2019 10:15 AM [facility] [place]   6/14/2019  4:00 PM Gwyn Fortune MD [facility]

## 2019-05-14 ENCOUNTER — PATIENT OUTREACH (OUTPATIENT)
Dept: ADMINISTRATIVE | Facility: HOSPITAL | Age: 64
End: 2019-05-14

## 2019-05-24 DIAGNOSIS — E11.40 TYPE 2 DIABETES MELLITUS WITH DIABETIC NEUROPATHY, WITHOUT LONG-TERM CURRENT USE OF INSULIN: ICD-10-CM

## 2019-05-24 RX ORDER — INSULIN PUMP SYRINGE, 3 ML
EACH MISCELLANEOUS
Qty: 1 EACH | Refills: 0 | Status: SHIPPED | OUTPATIENT
Start: 2019-05-24 | End: 2019-06-14

## 2019-05-24 NOTE — TELEPHONE ENCOUNTER
----- Message from Kiya Bocanegra sent at 5/24/2019  3:49 PM CDT -----  Contact: [facility]/[phone]  Pharmacy did not receive the meter to go with test strips and lancets. They state that they only received strips and lancets. Please advise,        Thank You

## 2019-06-14 ENCOUNTER — OFFICE VISIT (OUTPATIENT)
Dept: INTERNAL MEDICINE | Facility: CLINIC | Age: 64
End: 2019-06-14
Payer: MEDICARE

## 2019-06-14 VITALS
DIASTOLIC BLOOD PRESSURE: 80 MMHG | BODY MASS INDEX: 36.5 KG/M2 | OXYGEN SATURATION: 98 % | WEIGHT: 232.56 LBS | HEIGHT: 67 IN | HEART RATE: 48 BPM | SYSTOLIC BLOOD PRESSURE: 130 MMHG

## 2019-06-14 DIAGNOSIS — R07.89 ATYPICAL CHEST PAIN: ICD-10-CM

## 2019-06-14 DIAGNOSIS — E66.01 MORBID OBESITY: ICD-10-CM

## 2019-06-14 DIAGNOSIS — R35.1 BPH ASSOCIATED WITH NOCTURIA: ICD-10-CM

## 2019-06-14 DIAGNOSIS — N40.1 BPH ASSOCIATED WITH NOCTURIA: ICD-10-CM

## 2019-06-14 DIAGNOSIS — I10 ESSENTIAL HYPERTENSION: Primary | ICD-10-CM

## 2019-06-14 DIAGNOSIS — K76.0 FATTY LIVER: ICD-10-CM

## 2019-06-14 DIAGNOSIS — E55.9 VITAMIN D INSUFFICIENCY: ICD-10-CM

## 2019-06-14 DIAGNOSIS — E11.40 TYPE 2 DIABETES MELLITUS WITH DIABETIC NEUROPATHY, WITHOUT LONG-TERM CURRENT USE OF INSULIN: ICD-10-CM

## 2019-06-14 DIAGNOSIS — E78.2 MIXED HYPERLIPIDEMIA: ICD-10-CM

## 2019-06-14 DIAGNOSIS — D50.9 MICROCYTIC ANEMIA: ICD-10-CM

## 2019-06-14 PROCEDURE — 99999 PR PBB SHADOW E&M-EST. PATIENT-LVL IV: CPT | Mod: PBBFAC,,, | Performed by: INTERNAL MEDICINE

## 2019-06-14 PROCEDURE — 99999 PR PBB SHADOW E&M-EST. PATIENT-LVL IV: ICD-10-PCS | Mod: PBBFAC,,, | Performed by: INTERNAL MEDICINE

## 2019-06-14 PROCEDURE — 99214 PR OFFICE/OUTPT VISIT, EST, LEVL IV, 30-39 MIN: ICD-10-PCS | Mod: S$PBB,,, | Performed by: INTERNAL MEDICINE

## 2019-06-14 PROCEDURE — 99214 OFFICE O/P EST MOD 30 MIN: CPT | Mod: S$PBB,,, | Performed by: INTERNAL MEDICINE

## 2019-06-14 PROCEDURE — 99214 OFFICE O/P EST MOD 30 MIN: CPT | Mod: PBBFAC | Performed by: INTERNAL MEDICINE

## 2019-06-14 RX ORDER — METFORMIN HYDROCHLORIDE 1000 MG/1
1000 TABLET ORAL 2 TIMES DAILY WITH MEALS
Qty: 180 TABLET | Refills: 3 | Status: SHIPPED | OUTPATIENT
Start: 2019-06-14 | End: 2022-07-11 | Stop reason: SDUPTHER

## 2019-06-14 RX ORDER — ASPIRIN 325 MG
50000 TABLET, DELAYED RELEASE (ENTERIC COATED) ORAL WEEKLY
Qty: 12 CAPSULE | Refills: 4 | Status: SHIPPED | OUTPATIENT
Start: 2019-06-14 | End: 2021-09-30

## 2019-06-14 NOTE — PROGRESS NOTES
INTERNAL MEDICINE CLINIC  Follow-up Visit Progress Note     PRESENTING HISTORY     PCP: Gwyn Fortune MD    Last Clinic Visit with me: 2-22-19    Current Chief Complaint/Problem:    Chief Complaint   Patient presents with    Follow-up    Hip Pain     left hip pain     History of Present Illness & ROS: Mr. Kilo Lam Sr. is a 63 y.o. male.    He has been struggling with taking his meds. May not be taking DM meds.  Now he is back on meds.    Has some atypical chest pain to lower neck on and off.  No chest pain on exertion. No SOB.    PAST HISTORY:     Past Medical History:   Diagnosis Date    Anxiety and depression 11/14/2018    Carpal tunnel syndrome, bilateral 4/9/2013    S/p Lt Carpal tunnel release 11/2011     DJD (degenerative joint disease) of lumbar spine 2/9/2015    Essential hypertension 11/6/2012    Fatty liver 11/26/2012    2012: US of ABD showed: Severe hepatic steatosis and  Hepatomegaly.    Mixed hyperlipidemia 12/5/2014    Morbid obesity 4/9/2013    BMI= 41.2 on 04/09/2013     Type 2 diabetes mellitus with diabetic neuropathy, without long-term current use of insulin 12/5/2014       Past Surgical History:   Procedure Laterality Date    CARPAL TUNNEL RELEASE Left 11/2011    no relief of hand symptoms    COLONOSCOPY  2004    normal, repeat in 7 yrs    COLONOSCOPY N/A 3/27/2019    Performed [facility] Smooth German [facility] (4TH FLR)    INCISIONAL HERNIA REPAIR Left 12/2012    inguinal    REPAIR, HERNIA, INGUINAL, WITHOUT HISTORY OF PRIOR REPAIR, AGE 3 YEARS OR OLDER Left 11/27/2012    Performed by Martinez Jones Jr., MD at [facility] OR 2ND FLR    SHOULDER SURGERY Left 1972       Family History   Problem Relation Age of Onset    Diabetes Mother     Diabetes Father     Heart failure Father     Cancer Daughter 14        Leukemia, passed    Cancer Other         niece, Lung cancer    Liver disease Neg Hx     Colon cancer Neg Hx        Social History     Socioeconomic History     Marital status:      Spouse name: Alicia    Number of children: 5    Years of education: Not on file    Highest education level: Not on file   Occupational History    Not on file   Social Needs    Financial resource strain: Not on file    Food insecurity:     Worry: Not on file     Inability: Not on file    Transportation needs:     Medical: Not on file     Non-medical: Not on file   Tobacco Use    Smoking status: Former Smoker     Last attempt to quit: 1/28/2009     Years since quitting: 10.3    Smokeless tobacco: Never Used   Substance and Sexual Activity    Alcohol use: No    Drug use: No    Sexual activity: Yes     Partners: Female   Lifestyle    Physical activity:     Days per week: Not on file     Minutes per session: Not on file    Stress: Not on file   Relationships    Social connections:     Talks on phone: Not on file     Gets together: Not on file     Attends Jainism service: Not on file     Active member of club or organization: Not on file     Attends meetings of clubs or organizations: Not on file     Relationship status: Not on file   Other Topics Concern    Not on file   Social History Narrative    Retired . Now a .     to Alicia.  5 children.       MEDICATIONS & ALLERGIES:     Current Outpatient Medications on File Prior to Visit   Medication Sig Dispense Refill    amLODIPine (NORVASC) 10 MG tablet Take 1 tablet (10 mg total) by mouth once daily. 90 tablet 3    aspirin (ECOTRIN) 81 MG EC tablet Take 81 mg by mouth once daily.      blood sugar diagnostic Strp 1 strip by Misc.(Non-Drug; Combo Route) route before meals and at bedtime as needed. 200 strip 4    blood-glucose meter kit To check BG 3 times daily, to use with insurance preferred meter 1 each 0    ferrous sulfate (FEOSOL) 325 mg (65 mg iron) Tab tablet Take 1 tablet (325 mg total) by mouth daily with breakfast.  0    fluticasone (FLONASE) 50 mcg/actuation nasal spray 1  spray by Nasal route.      glipiZIDE (GLUCOTROL) 10 MG TR24 Take 1 tablet (10 mg total) by mouth daily with breakfast. 90 tablet 3    lancets (LANCETS,ULTRA THIN) Misc 1 application by Misc.(Non-Drug; Combo Route) route before meals and at bedtime as needed. 200 each 4    losartan (COZAAR) 100 MG tablet Take 1 tablet (100 mg total) by mouth once daily. 90 tablet 3    rosuvastatin (CRESTOR) 10 MG tablet Take 1 tablet (10 mg total) by mouth once daily. 90 tablet 3    spironolactone (ALDACTONE) 25 MG tablet Take 1 tablet (25 mg total) by mouth once daily. 90 tablet 3    tamsulosin (FLOMAX) 0.4 mg Cap Take 1 capsule (0.4 mg total) by mouth every evening. 90 capsule 3    blood sugar diagnostic Strp To check BG 3 times daily, to use with insurance preferred meter 100 each 0    [ blood-glucose meter kit Use as instructed 1 each 0            lancets Misc To check BG 3 times daily, to use with insurance preferred meter 100 each 0           metFORMIN (GLUCOPHAGE) 1000 MG tablet Take 1 tablet (1,000 mg total) by mouth 2 (two) times daily with meals. 180 tablet 3                                 No current facility-administered medications on file prior to visit.         Review of patient's allergies indicates:   Allergen Reactions    No known drug allergies        Medications Reconciliation:   I have reconciled the patient's home medications and discharge medications with the patient/family. I have updated all changes.  Refer to After-Visit Medication List.    OBJECTIVE:     Vital Signs:  Vitals:    06/14/19 1603   BP: 130/80   Pulse: (!) 48     Wt Readings from Last 1 Encounters:   06/14/19 1603 105.5 kg (232 lb 9.4 oz)     Body mass index is 36.43 kg/m².     Physical Exam:  General: Well developed, well nourished. No distress.  HEENT: Head is normocephalic, atraumatic  Eyes: Clear conjunctiva.  Neck: Supple, symmetrical neck; trachea midline.  Lungs: Clear to auscultation bilaterally and normal respiratory  effort.  Cardiovascular: Heart with regular rate and rhythm.    Extremities: No LE edema.  Abdomen: Abdomen is soft, non-tender non-distended with normal bowel sounds.  Skin: Skin color, texture, turgor normal. No rashes.  Musculoskeletal: Normal gait.   Psychiatric: Normal affect. Alert.    Laboratory  Lab Results   Component Value Date    WBC 6.75 05/08/2019    HGB 13.7 (L) 05/08/2019    HCT 44.0 05/08/2019     05/08/2019    CHOL 191 05/08/2019    TRIG 163 (H) 05/08/2019    HDL 54 05/08/2019    ALT 14 05/08/2019    AST 16 05/08/2019     05/08/2019    K 4.5 05/08/2019    CL 98 05/08/2019    CREATININE 1.0 05/08/2019    BUN 14 05/08/2019    CO2 25 05/08/2019    TSH 1.207 05/08/2019    PSA 0.46 05/08/2019    INR 0.9 01/30/2013    HGBA1C >14.0 (H) 05/08/2019     ASSESSMENT & PLAN:     Type 2 diabetes mellitus with diabetic neuropathy, without long-term current use of insulin  - Not controlled: A1C >14. He was confused and did not take meds. Now back on routine.     Plan: Metformin 1000 mg BID, Glucotrol XL 10 mg daily.           RTC 1 month.    Atypical Chest Pain  - High risk for CAD    Will schedule Stress Echo.    Microcytic anemia  -  On Iron daily. HH has improved.  Need C-scope. Will discuss next visit.     Essential hypertension  - Controlled on:  -     losartan (COZAAR) 100 MG tablet; Take 1 tablet (100 mg total) by mouth once daily.    -     spironolactone (ALDACTONE) 25 MG tablet; Take 1 tablet (25 mg total) by mouth once daily.       Mixed hyperlipidemia  Fatty liver  - Good control on:  -     rosuvastatin (CRESTOR) 10 MG tablet; Take 1 tablet (10 mg total) by mouth once daily.      Anxiety and depression  - Stable currently. No meds.     Vitamin D insufficiency  -     cholecalciferol, vitamin D3, 50,000 unit capsule; Take 1 capsule (50,000 Units total) by mouth once a week.  Dispense: 12 capsule; Refill: 4    Morbid obesity   Body mass index is 36.43 kg/m².    BPH associated with nocturia  -      tamsulosin (FLOMAX) 0.4 mg Cap; Take 1 capsule (0.4 mg total) by mouth every evening.      Preventive Health Maintenance:  Will review next visit.     Return to Clinic for Follow Up with me: 1 month.    Scheduled Follow-up :  Future Appointments   Date Time Provider Department Center   7/16/2019  4:30 PM Gwyn Fortune MD [facility]       After Visit Medication List :     Medication List           Accurate as of 6/14/19  4:38 PM. If you have any questions, ask your nurse or doctor.               START taking these medications    cholecalciferol (vitamin D3) 50,000 unit capsule  Take 1 capsule (50,000 Units total) by mouth once a week.  Started by:  Gwyn Fortune MD        CHANGE how you take these medications    blood sugar diagnostic Strp  1 strip by Misc.(Non-Drug; Combo Route) route before meals and at bedtime as needed.  What changed:  Another medication with the same name was removed. Continue taking this medication, and follow the directions you see here.  Changed by:  Gwyn Fortune MD     blood-glucose meter kit  To check BG 3 times daily, to use with insurance preferred meter  What changed:  Another medication with the same name was removed. Continue taking this medication, and follow the directions you see here.  Changed by:  Gwyn Fortune MD        CONTINUE taking these medications    amLODIPine 10 MG tablet  Commonly known as:  NORVASC  Take 1 tablet (10 mg total) by mouth once daily.     aspirin 81 MG EC tablet  Commonly known as:  ECOTRIN     ferrous sulfate 325 mg (65 mg iron) Tab tablet  Commonly known as:  FEOSOL  Take 1 tablet (325 mg total) by mouth daily with breakfast.     fluticasone propionate 50 mcg/actuation nasal spray  Commonly known as:  FLONASE     glipiZIDE 10 MG Tr24  Commonly known as:  GLUCOTROL  Take 1 tablet (10 mg total) by mouth daily with breakfast.     lancets Misc  Commonly known as:  LANCETS,ULTRA THIN  1 application by Misc.(Non-Drug; Combo Route) route before  meals and at bedtime as needed.     losartan 100 MG tablet  Commonly known as:  COZAAR  Take 1 tablet (100 mg total) by mouth once daily.     metFORMIN 1000 MG tablet  Commonly known as:  GLUCOPHAGE  Take 1 tablet (1,000 mg total) by mouth 2 (two) times daily with meals.     rosuvastatin 10 MG tablet  Commonly known as:  CRESTOR  Take 1 tablet (10 mg total) by mouth once daily.     spironolactone 25 MG tablet  Commonly known as:  ALDACTONE  Take 1 tablet (25 mg total) by mouth once daily.     tamsulosin 0.4 mg Cap  Commonly known as:  FLOMAX  Take 1 capsule (0.4 mg total) by mouth every evening.        STOP taking these medications    gabapentin 300 MG capsule  Commonly known as:  NEURONTIN  Stopped by:  Gwyn Fortune MD     linaGLIPtin 5 mg Tab tablet  Commonly known as:  TRADJENTA  Stopped by:  Gwyn Fortune MD     nystatin powder  Commonly known as:  MYCOSTATIN  Stopped by:  Gwyn Fortune MD     sodium,potassium,mag sulfates 17.5-3.13-1.6 gram Solr  Commonly known as:  SUPREP BOWEL PREP KIT  Stopped by:  Gwyn Fortune MD     tiZANidine 4 MG tablet  Commonly known as:  ZANAFLEX  Stopped by:  Gwyn Fortune MD     triamcinolone acetonide 0.1% 0.1 % cream  Commonly known as:  KENALOG  Stopped by:  Gwyn Fortune MD           Where to Get Your Medications      These medications were sent to [facility] [address] [place] [address] [address] [facility]  [address] [facility] [address]    Phone:  [phone]   · cholecalciferol (vitamin D3) 50,000 unit capsule  · metFORMIN 1000 MG tablet         Signing Physician:  Gwyn Fortune MD

## 2019-11-12 ENCOUNTER — TELEPHONE (OUTPATIENT)
Dept: INTERNAL MEDICINE | Facility: CLINIC | Age: 64
End: 2019-11-12

## 2019-11-12 DIAGNOSIS — M79.643 PAIN OF HAND, UNSPECIFIED LATERALITY: Primary | ICD-10-CM

## 2019-11-12 NOTE — TELEPHONE ENCOUNTER
----- Message from Jeremie Sosa sent at 11/12/2019  2:19 PM CST -----  Contact: pt @ [phone]  Hello,    Pt requesting to see Dr. Ritter in PMR for pain in hands, pt hasn't seen the doctor since 2015 so pt would be considered a new pt. PMR requires a referral for new patients. Pt is asking Dr. Fortune put referral in chart. Pt asking for call back if he needs to see Dr. Fortune before referral can be created. Pls contact pt once referral has been placed.    Thank you,    Jeremie Sosa

## 2019-11-13 ENCOUNTER — TELEPHONE (OUTPATIENT)
Dept: PHYSICAL MEDICINE AND REHAB | Facility: CLINIC | Age: 64
End: 2019-11-13

## 2019-11-13 NOTE — TELEPHONE ENCOUNTER
----- Message from Vivien Lewis sent at 11/13/2019 12:30 PM CST -----  Contact: self  [phone]  Pt is calling to schedule an appt for NP, visit. Pt was a former pt of the doctor just move back from out of town.    Pt is seeking a call back to be scheduled.      Thank you!

## 2019-11-14 ENCOUNTER — TELEPHONE (OUTPATIENT)
Dept: PHYSICAL MEDICINE AND REHAB | Facility: CLINIC | Age: 64
End: 2019-11-14

## 2019-11-14 NOTE — TELEPHONE ENCOUNTER
----- Message from Azra Brown sent at 11/14/2019 12:21 PM CST -----  Contact: pt   Type:  Sooner Appoointment Request    Caller is requesting a sooner appointment.    Name of Caller:Kilo  When is the first available appointment? 12/6  Symptoms:pain in lower back and hands  Would the patient rather a call back or a response via [facility]? Call back   Best Call Back Number:[phone]   Additional Information: n/a

## 2019-11-25 DIAGNOSIS — I10 ESSENTIAL HYPERTENSION: ICD-10-CM

## 2019-11-25 RX ORDER — AMLODIPINE BESYLATE 10 MG/1
TABLET ORAL
Qty: 90 TABLET | Refills: 3 | Status: SHIPPED | OUTPATIENT
Start: 2019-11-25 | End: 2020-11-10

## 2019-12-05 ENCOUNTER — PATIENT OUTREACH (OUTPATIENT)
Dept: ADMINISTRATIVE | Facility: OTHER | Age: 64
End: 2019-12-05

## 2020-01-08 ENCOUNTER — CLINICAL SUPPORT (OUTPATIENT)
Dept: URGENT CARE | Facility: CLINIC | Age: 65
End: 2020-01-08
Payer: MEDICARE

## 2020-01-08 VITALS
OXYGEN SATURATION: 98 % | SYSTOLIC BLOOD PRESSURE: 183 MMHG | DIASTOLIC BLOOD PRESSURE: 76 MMHG | BODY MASS INDEX: 37.4 KG/M2 | WEIGHT: 238.81 LBS | TEMPERATURE: 98 F | HEART RATE: 79 BPM

## 2020-01-08 DIAGNOSIS — M25.512 ACUTE PAIN OF LEFT SHOULDER: Primary | ICD-10-CM

## 2020-01-08 DIAGNOSIS — M15.9 OSTEOARTHRITIS OF MULTIPLE JOINTS, UNSPECIFIED OSTEOARTHRITIS TYPE: ICD-10-CM

## 2020-01-08 PROCEDURE — 99204 PR OFFICE/OUTPT VISIT, NEW, LEVL IV, 45-59 MIN: ICD-10-PCS | Mod: 25,S$GLB,, | Performed by: NURSE PRACTITIONER

## 2020-01-08 PROCEDURE — 99204 OFFICE O/P NEW MOD 45 MIN: CPT | Mod: 25,S$GLB,, | Performed by: NURSE PRACTITIONER

## 2020-01-08 RX ORDER — KETOROLAC TROMETHAMINE 30 MG/ML
30 INJECTION, SOLUTION INTRAMUSCULAR; INTRAVENOUS
Status: SHIPPED | OUTPATIENT
Start: 2020-01-08 | End: 2020-01-13

## 2020-01-08 RX ORDER — METHOCARBAMOL 500 MG/1
500 TABLET, FILM COATED ORAL 4 TIMES DAILY
Qty: 40 TABLET | Refills: 0 | Status: SHIPPED | OUTPATIENT
Start: 2020-01-08 | End: 2020-01-18

## 2020-01-08 RX ORDER — HYDROCODONE BITARTRATE AND ACETAMINOPHEN 5; 325 MG/1; MG/1
1 TABLET ORAL EVERY 6 HOURS PRN
Qty: 20 TABLET | Refills: 0 | Status: SHIPPED | OUTPATIENT
Start: 2020-01-08 | End: 2021-09-30

## 2020-01-08 NOTE — LETTER
January 8, 2020      [facility]  [address]  [place] [address]  Phone: [phone]       Patient: Kilo Lam   YOB: 1955  Date of Visit: 01/08/2020    To Whom It May Concern:    Kilo Lam  was at [facility] on 01/08/2020. He may return to work/school on 01/13/2020 with no restrictions. If you have any questions or concerns, or if I can be of further assistance, please do not hesitate to contact me.    Sincerely,    Roseanne Jeffries MA

## 2020-01-08 NOTE — PROGRESS NOTES
Subjective:       Patient ID: Kilo Lam Sr. is a 64 y.o. male.    Vitals:  weight is 108.3 kg (238 lb 12.8 oz). His oral temperature is 97.8 °F (36.6 °C). His blood pressure is 183/76 (abnormal) and his pulse is 79. His oxygen saturation is 98%.     Chief Complaint: Shoulder Pain    Pt presents for left shoulder pain x 2 days. Pt reports h/o osteoarthritis to back, shoulder and bilat hands. He states he has done PT in the past with improvement in pain. He went to the PT office today and was told he would need a referral before being eval and tx. Pt states he is unable to get in with PCP and is needing to find a new one. He states the shoulder pain started 2 days ago and is gradually worsening. He describes the pain as throbbing quality, constant timing, worsens with movement. He denies trauma. He also reports chronic pain to his lower back and bilat hands. He is unable to get in with his pain management until march.     Shoulder Pain    The pain is present in the left shoulder. This is a new problem. The current episode started in the past 7 days. The problem occurs constantly. The problem has been gradually worsening. The quality of the pain is described as sharp and aching. The pain is moderate. Associated symptoms include an inability to bear weight and stiffness. Pertinent negatives include no fever, headaches or limited range of motion. He has tried acetaminophen for the symptoms. The treatment provided no relief.       Constitution: Negative for chills, fatigue and fever.   HENT: Negative for congestion and sore throat.    Neck: Negative for painful lymph nodes.   Cardiovascular: Negative for chest pain and leg swelling.   Eyes: Negative for double vision and blurred vision.   Respiratory: Negative for cough and shortness of breath.    Gastrointestinal: Negative for nausea, vomiting and diarrhea.   Genitourinary: Negative for dysuria, frequency and urgency.   Musculoskeletal: Positive for joint pain and  back pain. Negative for joint swelling, abnormal ROM of joint, muscle cramps and muscle ache.   Skin: Negative for color change, pale and rash.   Allergic/Immunologic: Negative for seasonal allergies.   Neurological: Negative for dizziness, history of vertigo, light-headedness, passing out and headaches.   Hematologic/Lymphatic: Negative for swollen lymph nodes, easy bruising/bleeding and history of blood clots. Does not bruise/bleed easily.   Psychiatric/Behavioral: Negative for nervous/anxious, sleep disturbance and depression. The patient is not nervous/anxious.        Objective:      Physical Exam   Constitutional: He is oriented to person, place, and time. He appears well-developed and well-nourished. He is cooperative.  Non-toxic appearance. He does not appear ill. No distress.   HENT:   Head: Normocephalic and atraumatic.   Right Ear: Hearing, tympanic membrane, external ear and ear canal normal.   Left Ear: Hearing, tympanic membrane, external ear and ear canal normal.   Nose: Nose normal. No mucosal edema, rhinorrhea or nasal deformity. No epistaxis. Right sinus exhibits no maxillary sinus tenderness and no frontal sinus tenderness. Left sinus exhibits no maxillary sinus tenderness and no frontal sinus tenderness.   Mouth/Throat: Uvula is midline, oropharynx is clear and moist and mucous membranes are normal. No trismus in the jaw. Normal dentition. No uvula swelling. No posterior oropharyngeal erythema.   Eyes: Pupils are equal, round, and reactive to light. Conjunctivae, EOM and lids are normal. Right eye exhibits no discharge. Left eye exhibits no discharge. No scleral icterus.   Neck: Trachea normal, normal range of motion, full passive range of motion without pain and phonation normal. Neck supple.   Cardiovascular: Normal rate, regular rhythm, normal heart sounds, intact distal pulses and normal pulses.   Pulmonary/Chest: Effort normal and breath sounds normal. No stridor. No respiratory distress. He  has no wheezes. He has no rales. He exhibits no tenderness.   Abdominal: Soft. Normal appearance and bowel sounds are normal. He exhibits no distension, no pulsatile midline mass and no mass. There is no tenderness.   Musculoskeletal: Normal range of motion. He exhibits tenderness. He exhibits no edema or deformity.   Pain to left shoulder with ROM, no crepitus or edema. No erythema or rash.    Neurological: He is alert and oriented to person, place, and time. He exhibits normal muscle tone. Coordination normal.   Skin: Skin is warm, dry, intact, not diaphoretic and not pale.   Psychiatric: He has a normal mood and affect. His speech is normal and behavior is normal. Judgment and thought content normal. Cognition and memory are normal.   Nursing note and vitals reviewed.        Assessment:       1. Acute pain of left shoulder    2. Osteoarthritis of multiple joints, unspecified osteoarthritis type        Plan:         Acute pain of left shoulder  -     Ambulatory referral to Internal Medicine    Osteoarthritis of multiple joints, unspecified osteoarthritis type    Other orders  -     ketorolac injection 30 mg  -     HYDROcodone-acetaminophen (NORCO) 5-325 mg per tablet; Take 1 tablet by mouth every 6 (six) hours as needed.  Dispense: 20 tablet; Refill: 0  -     methocarbamol (ROBAXIN) 500 MG Tab; Take 1 tablet (500 mg total) by mouth 4 (four) times daily. for 10 days  Dispense: 40 tablet; Refill: 0    pt given rx for PT also. Advised to f/u with PCP and pain management.

## 2020-01-08 NOTE — PATIENT INSTRUCTIONS
Living with Osteoarthritis    Osteoarthritis is a chronic disease and the most common type of arthritis. But it doesnt have to keep you from leading an active life. You can help control symptoms by exercising and losing weight if you are overweight. Using special tools also helps make life easier. Be sure to see your healthcare provider for scheduled checkups and lab work. If you have questions or concerns between office visits, call your healthcare provider's office.  Make exercise part of your life  Gentle exercise can help lessen your pain. Keep the following in mind:  · Choose exercises that improve joint motion and make your muscles stronger. Your healthcare provider or a physical therapist may suggest a few.  · Stretching and flexibility activities such as yoga and dewayne chi may improve pain and joint motion.  · Try low-impact sports, such as walking, biking, or doing exercises in a warm pool.  · Most people should exercise for at least 30 minutes a day on most days of the week. This can be broken up into shorter periods throughout the day.  · Dont push yourself too hard at first. Slowly build up over time.  · Make sure you warm up for 5 to 10 minutes before you exercise.  · If pain and stiffness increase, don't exercise as hard or as long.  Watch your weight  If you weigh more than you should, your weight-bearing joints are under extra pressure. This makes your symptoms worse. To reduce pain and stiffness, try shedding a few of those extra pounds. The tips below may help:  · Start a weight-loss program with the help of your healthcare provider.  · Ask your friends and family for support.  · Join a weight-loss group.  Use special tools  Even simple tasks can be hard to do when your joints hurt. Special tools called assistive devices can make things easier by reducing strain and protecting your joints. Ask your healthcare provider where to find these and other helpful tools:  · Long-handled reachers or  grabbers  · Jar openers and button threaders  · Large  for pencils, garden tools, and other hand-held objects  Use mobility and other aids  People with arthritis and other joint problems often use mobility aids to help with walking. For example, they may use canes or walkers. They may also use splints or braces to support joints. Talk with your healthcare provider or physical therapist about these aids:  · A cane to reduce knee or hip pain and help prevent falls  · Splints for your wrists or other joints  · A brace to support a weak knee joint  · Orthotics for toe and foot involvement  Medical and surgical treatments  Discuss medical treatments with your healthcare provider to help reduce your pain and improve joint mobility.  · Topical medicines such as lidocaine, capsaicin, and diclofenac gel  · Oral medicines such as acetaminophen, NSAIDs (nonsteroidal anti-inflammatory medicines) such as ibuprofen and naproxen, or opioid narcotics  · Injections in affected joints such as corticosteroids in various joints, or hyaluronic acid in the knee joints  · Surgical repair or surgical joint replacement with artificial joints  · Complementary therapies such as heat and cold treatment, massage, acupuncture, supplements, cognitive training, meditation, and others. Discuss these options with your healthcare provider.  Date Last Reviewed: 2/14/2016  © 4833-4104 [employer]. [address] [place] [address], [place] [address]. All rights reserved. This information is not intended as a substitute for professional medical care. Always follow your healthcare professional's instructions.

## 2020-04-15 ENCOUNTER — PATIENT OUTREACH (OUTPATIENT)
Dept: ADMINISTRATIVE | Facility: OTHER | Age: 65
End: 2020-04-15

## 2020-04-15 DIAGNOSIS — Z12.11 ENCOUNTER FOR FIT (FECAL IMMUNOCHEMICAL TEST) SCREENING: ICD-10-CM

## 2020-04-15 DIAGNOSIS — E11.40 TYPE 2 DIABETES MELLITUS WITH DIABETIC NEUROPATHY, WITHOUT LONG-TERM CURRENT USE OF INSULIN: Primary | ICD-10-CM

## 2020-04-15 NOTE — PROGRESS NOTES
Chart reviewed.   Immunizations: Reconciled  Orders placed: Diabetic eye cam  Upcoming appts to satisfy RAD[facility] topics: n/a  Open case request for Colonoscopy.

## 2020-04-16 ENCOUNTER — TELEPHONE (OUTPATIENT)
Dept: PHYSICAL MEDICINE AND REHAB | Facility: CLINIC | Age: 65
End: 2020-04-16

## 2020-04-16 NOTE — TELEPHONE ENCOUNTER
Called patient number,still no answer.  Patient has an appointment today, called patient to schedule him for Virtual Visit.  Patient to contact the office back.

## 2020-04-16 NOTE — TELEPHONE ENCOUNTER
Another message left on patient answer machine in regards to his  Appointment for today @ 3:00.  Office number provided for a call back.  Due to COVID-19,  Audio and Virtual Visit appointments at this time.

## 2020-05-25 DIAGNOSIS — E78.2 MIXED HYPERLIPIDEMIA: ICD-10-CM

## 2020-05-25 RX ORDER — ROSUVASTATIN CALCIUM 10 MG/1
10 TABLET, COATED ORAL DAILY
Qty: 90 TABLET | Refills: 3 | Status: SHIPPED | OUTPATIENT
Start: 2020-05-25 | End: 2021-10-19 | Stop reason: SDUPTHER

## 2020-07-15 DIAGNOSIS — Z71.89 COMPLEX CARE COORDINATION: ICD-10-CM

## 2020-08-05 ENCOUNTER — PATIENT OUTREACH (OUTPATIENT)
Dept: ADMINISTRATIVE | Facility: OTHER | Age: 65
End: 2020-08-05

## 2020-08-06 ENCOUNTER — OFFICE VISIT (OUTPATIENT)
Dept: UROLOGY | Facility: CLINIC | Age: 65
End: 2020-08-06
Attending: UROLOGY
Payer: MEDICARE

## 2020-08-06 VITALS
HEART RATE: 71 BPM | BODY MASS INDEX: 37.47 KG/M2 | DIASTOLIC BLOOD PRESSURE: 62 MMHG | WEIGHT: 238.75 LBS | SYSTOLIC BLOOD PRESSURE: 149 MMHG | HEIGHT: 67 IN

## 2020-08-06 DIAGNOSIS — N52.9 ERECTILE DYSFUNCTION, UNSPECIFIED ERECTILE DYSFUNCTION TYPE: ICD-10-CM

## 2020-08-06 DIAGNOSIS — Z87.438 HISTORY OF BALANITIS: ICD-10-CM

## 2020-08-06 DIAGNOSIS — N47.1 PHIMOSIS OF PENIS: ICD-10-CM

## 2020-08-06 DIAGNOSIS — N47.1 PHIMOSIS: Primary | ICD-10-CM

## 2020-08-06 PROCEDURE — 99203 PR OFFICE/OUTPT VISIT, NEW, LEVL III, 30-44 MIN: ICD-10-PCS | Mod: S$GLB,,, | Performed by: UROLOGY

## 2020-08-06 PROCEDURE — 3077F PR MOST RECENT SYSTOLIC BLOOD PRESSURE >= 140 MM HG: ICD-10-PCS | Mod: CPTII,S$GLB,, | Performed by: UROLOGY

## 2020-08-06 PROCEDURE — 1125F PR PAIN SEVERITY QUANTIFIED, PAIN PRESENT: ICD-10-PCS | Mod: S$GLB,,, | Performed by: UROLOGY

## 2020-08-06 PROCEDURE — 1159F PR MEDICATION LIST DOCUMENTED IN MEDICAL RECORD: ICD-10-PCS | Mod: S$GLB,,, | Performed by: UROLOGY

## 2020-08-06 PROCEDURE — 3078F PR MOST RECENT DIASTOLIC BLOOD PRESSURE < 80 MM HG: ICD-10-PCS | Mod: CPTII,S$GLB,, | Performed by: UROLOGY

## 2020-08-06 PROCEDURE — 1101F PR PT FALLS ASSESS DOC 0-1 FALLS W/OUT INJ PAST YR: ICD-10-PCS | Mod: CPTII,S$GLB,, | Performed by: UROLOGY

## 2020-08-06 PROCEDURE — 3008F PR BODY MASS INDEX (BMI) DOCUMENTED: ICD-10-PCS | Mod: CPTII,S$GLB,, | Performed by: UROLOGY

## 2020-08-06 PROCEDURE — 1125F AMNT PAIN NOTED PAIN PRSNT: CPT | Mod: S$GLB,,, | Performed by: UROLOGY

## 2020-08-06 PROCEDURE — 3008F BODY MASS INDEX DOCD: CPT | Mod: CPTII,S$GLB,, | Performed by: UROLOGY

## 2020-08-06 PROCEDURE — 99203 OFFICE O/P NEW LOW 30 MIN: CPT | Mod: S$GLB,,, | Performed by: UROLOGY

## 2020-08-06 PROCEDURE — 1101F PT FALLS ASSESS-DOCD LE1/YR: CPT | Mod: CPTII,S$GLB,, | Performed by: UROLOGY

## 2020-08-06 PROCEDURE — 1159F MED LIST DOCD IN RCRD: CPT | Mod: S$GLB,,, | Performed by: UROLOGY

## 2020-08-06 PROCEDURE — 3078F DIAST BP <80 MM HG: CPT | Mod: CPTII,S$GLB,, | Performed by: UROLOGY

## 2020-08-06 PROCEDURE — 3077F SYST BP >= 140 MM HG: CPT | Mod: CPTII,S$GLB,, | Performed by: UROLOGY

## 2020-08-06 RX ORDER — BETAMETHASONE VALERATE 1.2 MG/G
OINTMENT TOPICAL 2 TIMES DAILY
Qty: 30 G | Refills: 3 | Status: SHIPPED | OUTPATIENT
Start: 2020-08-06 | End: 2021-09-22 | Stop reason: SDUPTHER

## 2020-08-06 RX ORDER — FLUCONAZOLE 150 MG/1
TABLET ORAL
COMMUNITY
Start: 2020-07-23 | End: 2021-09-30

## 2020-08-06 RX ORDER — KETOCONAZOLE 20 MG/G
CREAM TOPICAL
COMMUNITY
Start: 2020-07-23 | End: 2021-09-30

## 2020-08-06 NOTE — PROGRESS NOTES
"Subjective:      Kilo Lam Sr. is a 64 y.o. male who was self-referred for evaluation of balanitis and phimosis    He reports recurrent infections under his foreskin, and swelling of his foreskin with urinating. He has issues with his urinary stream spraying and getting his clothes wet due to phimosis and this has resulted in recurrent candidal infections in his intertriginous areas.    He reports ED, and has not had an erection for some time, but would like to address his phimosis first.    He also reports testicular pain today in his left testicle. This has been intermittently present since his hernia repair in 2012    The following portions of the patient's history were reviewed and updated as appropriate: allergies, current medications, past family history, past medical history, past social history, past surgical history and problem list.    Review of Systems  Constitutional: no fever or chills  ENT: no nasal congestion or sore throat  Respiratory: no cough or shortness of breath  Cardiovascular: no chest pain or palpitations  Gastrointestinal: no nausea or vomiting, tolerating diet  Genitourinary: as per HPI  Hematologic/Lymphatic: no easy bruising or lymphadenopathy  Musculoskeletal: no arthralgias or myalgias  Neurological: no seizures or tremors  Behavioral/Psych: no auditory or visual hallucinations     Objective:   Vitals: BP (!) 149/62 (BP Location: Left arm, Patient Position: Sitting, BP Method: Large (Automatic))   Pulse 71   Ht 5' 7" (1.702 m)   Wt 108.3 kg (238 lb 12.1 oz)   BMI 37.39 kg/m²     Physical Exam   General: alert and oriented, no acute distress  Head: normocephalic, atraumatic  Neck: supple, no lymphadenopathy, normal ROM, no masses  Respiratory: Symmetric expansion, non-labored breathing  Cardiovascular: regular rate and rhythm, nomal pulses, no peripheral edema  Abdomen: soft, non tender, non distended, no palpable masses, no hernias, no hepatomegaly or " splenomegaly  Genitourinary:   Penis: tight phimosis, barely able to see meatus, not entire glans no lesions, patent orthotopic meatus, no plaques  Scrotum: no rashes or skin changes;   Testes: descended bilaterally, no masses, nontender, normal epididymides bilaterally, no hydroceles  Prostate: Patient decline ТАТЬЯНА  Lymphatic: no inguinal nodes  Skin: normal coloration and turgor, no rashes, no suspicious skin lesions noted  Neuro: alert and oriented x3, no gross deficits  Psych: normal judgment and insight, normal mood/affect and non-anxious    Lab Review   Urinalysis demonstrates positive for red blood cells  Lab Results   Component Value Date    WBC 6.75 05/08/2019    HGB 13.7 (L) 05/08/2019    HGB 13.7 (L) 05/08/2012    HCT 44.0 05/08/2019    MCV 68 (L) 05/08/2019     05/08/2019     Lab Results   Component Value Date    CREATININE 1.0 05/08/2019    CREATININE 0.9 05/08/2012    BUN 14 05/08/2019     Lab Results   Component Value Date    PSA 0.46 05/08/2019     Imaging none    Assessment:     1. Phimosis    2. History of balanitis    3. Erectile dysfunction, unspecified erectile dysfunction type        Plan:     Will try betamethasone cream to see if this will relax his phimosis  Discussed that he may require circumcision, he will discuss with his wife  Follow up in 6 weeks

## 2020-08-06 NOTE — PROGRESS NOTES
Chart reviewed.   Immunizations: Triggered Imm Registry     Orders placed: n/a  Upcoming appts to satisfy [facility] topics: n/a

## 2020-09-16 ENCOUNTER — PATIENT OUTREACH (OUTPATIENT)
Dept: ADMINISTRATIVE | Facility: OTHER | Age: 65
End: 2020-09-16

## 2020-09-16 NOTE — PROGRESS NOTES
Health Maintenance Due   Topic Date Due    Shingles Vaccine (1 of 2) 12/20/2003    Colorectal Cancer Screening  08/21/2015    TETANUS VACCINE  11/11/2015    Eye Exam  08/18/2017    Pneumococcal Vaccine (65+ Low/Medium Risk) (1 of 2 - PCV13) 12/20/2018    Abdominal Aortic Aneurysm Screening  12/20/2018    Urine Microalbumin  02/26/2019    Hemoglobin A1c  08/08/2019    Foot Exam  11/19/2019    Lipid Panel  05/08/2020    Influenza Vaccine (1) 08/01/2020     Updates were requested from care everywhere.  Chart was reviewed for overdue Proactive [facility] Encounters ([facility]) topics (CRS, Breast Cancer Screening, Eye exam)  Health Maintenance has been updated.  LINKS immunization registry triggered.  Immunizations were reconciled.

## 2020-10-05 ENCOUNTER — PATIENT MESSAGE (OUTPATIENT)
Dept: ADMINISTRATIVE | Facility: HOSPITAL | Age: 65
End: 2020-10-05

## 2021-01-04 ENCOUNTER — PATIENT MESSAGE (OUTPATIENT)
Dept: ADMINISTRATIVE | Facility: HOSPITAL | Age: 66
End: 2021-01-04

## 2021-03-26 DIAGNOSIS — I10 ESSENTIAL HYPERTENSION: ICD-10-CM

## 2021-03-26 RX ORDER — AMLODIPINE BESYLATE 10 MG/1
TABLET ORAL
Qty: 90 TABLET | Refills: 0 | OUTPATIENT
Start: 2021-03-26

## 2021-04-05 ENCOUNTER — PATIENT MESSAGE (OUTPATIENT)
Dept: ADMINISTRATIVE | Facility: HOSPITAL | Age: 66
End: 2021-04-05

## 2021-04-10 ENCOUNTER — PATIENT MESSAGE (OUTPATIENT)
Dept: INTERNAL MEDICINE | Facility: CLINIC | Age: 66
End: 2021-04-10

## 2021-08-24 DIAGNOSIS — E78.2 MIXED HYPERLIPIDEMIA: ICD-10-CM

## 2021-08-24 RX ORDER — ROSUVASTATIN CALCIUM 10 MG/1
10 TABLET, COATED ORAL DAILY
Qty: 90 TABLET | Refills: 3 | OUTPATIENT
Start: 2021-08-24

## 2021-09-16 ENCOUNTER — PATIENT OUTREACH (OUTPATIENT)
Dept: ADMINISTRATIVE | Facility: HOSPITAL | Age: 66
End: 2021-09-16

## 2021-09-22 ENCOUNTER — OFFICE VISIT (OUTPATIENT)
Dept: PRIMARY CARE CLINIC | Facility: CLINIC | Age: 66
End: 2021-09-22
Payer: MEDICARE

## 2021-09-22 VITALS
HEART RATE: 75 BPM | WEIGHT: 219.56 LBS | BODY MASS INDEX: 34.46 KG/M2 | HEIGHT: 67 IN | SYSTOLIC BLOOD PRESSURE: 142 MMHG | TEMPERATURE: 98 F | OXYGEN SATURATION: 98 % | DIASTOLIC BLOOD PRESSURE: 68 MMHG

## 2021-09-22 DIAGNOSIS — N47.1 PHIMOSIS OF PENIS: ICD-10-CM

## 2021-09-22 DIAGNOSIS — G89.29 CHRONIC LEFT-SIDED LOW BACK PAIN WITH LEFT-SIDED SCIATICA: ICD-10-CM

## 2021-09-22 DIAGNOSIS — M79.641 BILATERAL HAND PAIN: ICD-10-CM

## 2021-09-22 DIAGNOSIS — M54.42 CHRONIC LEFT-SIDED LOW BACK PAIN WITH LEFT-SIDED SCIATICA: ICD-10-CM

## 2021-09-22 DIAGNOSIS — G62.9 NEUROPATHY: Primary | ICD-10-CM

## 2021-09-22 DIAGNOSIS — M79.642 BILATERAL HAND PAIN: ICD-10-CM

## 2021-09-22 DIAGNOSIS — H10.33 ACUTE BACTERIAL CONJUNCTIVITIS OF BOTH EYES: ICD-10-CM

## 2021-09-22 PROCEDURE — 99999 PR PBB SHADOW E&M-EST. PATIENT-LVL V: ICD-10-PCS | Mod: PBBFAC,,, | Performed by: NURSE PRACTITIONER

## 2021-09-22 PROCEDURE — 1101F PR PT FALLS ASSESS DOC 0-1 FALLS W/OUT INJ PAST YR: ICD-10-PCS | Mod: CPTII,S$GLB,, | Performed by: NURSE PRACTITIONER

## 2021-09-22 PROCEDURE — 1160F PR REVIEW ALL MEDS BY PRESCRIBER/CLIN PHARMACIST DOCUMENTED: ICD-10-PCS | Mod: CPTII,S$GLB,, | Performed by: NURSE PRACTITIONER

## 2021-09-22 PROCEDURE — 4010F ACE/ARB THERAPY RXD/TAKEN: CPT | Mod: CPTII,S$GLB,, | Performed by: NURSE PRACTITIONER

## 2021-09-22 PROCEDURE — 1101F PT FALLS ASSESS-DOCD LE1/YR: CPT | Mod: CPTII,S$GLB,, | Performed by: NURSE PRACTITIONER

## 2021-09-22 PROCEDURE — 1159F MED LIST DOCD IN RCRD: CPT | Mod: CPTII,S$GLB,, | Performed by: NURSE PRACTITIONER

## 2021-09-22 PROCEDURE — 1125F PR PAIN SEVERITY QUANTIFIED, PAIN PRESENT: ICD-10-PCS | Mod: CPTII,S$GLB,, | Performed by: NURSE PRACTITIONER

## 2021-09-22 PROCEDURE — 1125F AMNT PAIN NOTED PAIN PRSNT: CPT | Mod: CPTII,S$GLB,, | Performed by: NURSE PRACTITIONER

## 2021-09-22 PROCEDURE — 3077F SYST BP >= 140 MM HG: CPT | Mod: CPTII,S$GLB,, | Performed by: NURSE PRACTITIONER

## 2021-09-22 PROCEDURE — 3008F PR BODY MASS INDEX (BMI) DOCUMENTED: ICD-10-PCS | Mod: CPTII,S$GLB,, | Performed by: NURSE PRACTITIONER

## 2021-09-22 PROCEDURE — 3288F FALL RISK ASSESSMENT DOCD: CPT | Mod: CPTII,S$GLB,, | Performed by: NURSE PRACTITIONER

## 2021-09-22 PROCEDURE — 99214 OFFICE O/P EST MOD 30 MIN: CPT | Mod: S$GLB,,, | Performed by: NURSE PRACTITIONER

## 2021-09-22 PROCEDURE — 3008F BODY MASS INDEX DOCD: CPT | Mod: CPTII,S$GLB,, | Performed by: NURSE PRACTITIONER

## 2021-09-22 PROCEDURE — 99999 PR PBB SHADOW E&M-EST. PATIENT-LVL V: CPT | Mod: PBBFAC,,, | Performed by: NURSE PRACTITIONER

## 2021-09-22 PROCEDURE — 3288F PR FALLS RISK ASSESSMENT DOCUMENTED: ICD-10-PCS | Mod: CPTII,S$GLB,, | Performed by: NURSE PRACTITIONER

## 2021-09-22 PROCEDURE — 1159F PR MEDICATION LIST DOCUMENTED IN MEDICAL RECORD: ICD-10-PCS | Mod: CPTII,S$GLB,, | Performed by: NURSE PRACTITIONER

## 2021-09-22 PROCEDURE — 99214 PR OFFICE/OUTPT VISIT, EST, LEVL IV, 30-39 MIN: ICD-10-PCS | Mod: S$GLB,,, | Performed by: NURSE PRACTITIONER

## 2021-09-22 PROCEDURE — 1157F PR ADVANCE CARE PLAN OR EQUIV PRESENT IN MEDICAL RECORD: ICD-10-PCS | Mod: CPTII,S$GLB,, | Performed by: NURSE PRACTITIONER

## 2021-09-22 PROCEDURE — 4010F PR ACE/ARB THEARPY RXD/TAKEN: ICD-10-PCS | Mod: CPTII,S$GLB,, | Performed by: NURSE PRACTITIONER

## 2021-09-22 PROCEDURE — 1157F ADVNC CARE PLAN IN RCRD: CPT | Mod: CPTII,S$GLB,, | Performed by: NURSE PRACTITIONER

## 2021-09-22 PROCEDURE — 3077F PR MOST RECENT SYSTOLIC BLOOD PRESSURE >= 140 MM HG: ICD-10-PCS | Mod: CPTII,S$GLB,, | Performed by: NURSE PRACTITIONER

## 2021-09-22 PROCEDURE — 1160F RVW MEDS BY RX/DR IN RCRD: CPT | Mod: CPTII,S$GLB,, | Performed by: NURSE PRACTITIONER

## 2021-09-22 PROCEDURE — 3078F DIAST BP <80 MM HG: CPT | Mod: CPTII,S$GLB,, | Performed by: NURSE PRACTITIONER

## 2021-09-22 PROCEDURE — 3078F PR MOST RECENT DIASTOLIC BLOOD PRESSURE < 80 MM HG: ICD-10-PCS | Mod: CPTII,S$GLB,, | Performed by: NURSE PRACTITIONER

## 2021-09-22 RX ORDER — TIZANIDINE 2 MG/1
4 TABLET ORAL NIGHTLY PRN
Qty: 20 TABLET | Refills: 0 | Status: SHIPPED | OUTPATIENT
Start: 2021-09-22 | End: 2021-09-30

## 2021-09-22 RX ORDER — CIPROFLOXACIN HYDROCHLORIDE 3 MG/ML
1 SOLUTION/ DROPS OPHTHALMIC EVERY 4 HOURS
Qty: 20 ML | Refills: 0 | Status: SHIPPED | OUTPATIENT
Start: 2021-09-22 | End: 2021-09-25

## 2021-09-22 RX ORDER — BETAMETHASONE VALERATE 1.2 MG/G
OINTMENT TOPICAL 2 TIMES DAILY
Qty: 30 G | Refills: 3 | Status: SHIPPED | OUTPATIENT
Start: 2021-09-22

## 2021-09-22 RX ORDER — DICLOFENAC SODIUM 10 MG/G
2 GEL TOPICAL DAILY
Qty: 100 G | Refills: 1 | Status: SHIPPED | OUTPATIENT
Start: 2021-09-22 | End: 2023-06-23

## 2021-09-22 RX ORDER — CETIRIZINE HYDROCHLORIDE 10 MG/1
10 TABLET ORAL NIGHTLY
Qty: 30 TABLET | Refills: 0 | Status: ON HOLD | OUTPATIENT
Start: 2021-09-22 | End: 2024-03-08

## 2021-09-22 RX ORDER — DULOXETIN HYDROCHLORIDE 30 MG/1
30 CAPSULE, DELAYED RELEASE ORAL DAILY
Qty: 30 CAPSULE | Refills: 11 | Status: SHIPPED | OUTPATIENT
Start: 2021-09-22 | End: 2022-06-15 | Stop reason: DRUGHIGH

## 2021-09-30 ENCOUNTER — LAB VISIT (OUTPATIENT)
Dept: PRIMARY CARE CLINIC | Facility: CLINIC | Age: 66
End: 2021-09-30
Payer: MEDICARE

## 2021-09-30 ENCOUNTER — OFFICE VISIT (OUTPATIENT)
Dept: PRIMARY CARE CLINIC | Facility: CLINIC | Age: 66
End: 2021-09-30
Payer: MEDICARE

## 2021-09-30 VITALS
OXYGEN SATURATION: 98 % | BODY MASS INDEX: 35.48 KG/M2 | HEART RATE: 72 BPM | RESPIRATION RATE: 18 BRPM | WEIGHT: 226.06 LBS | SYSTOLIC BLOOD PRESSURE: 184 MMHG | DIASTOLIC BLOOD PRESSURE: 77 MMHG | TEMPERATURE: 98 F | HEIGHT: 67 IN

## 2021-09-30 DIAGNOSIS — E11.59 HYPERTENSION ASSOCIATED WITH TYPE 2 DIABETES MELLITUS: ICD-10-CM

## 2021-09-30 DIAGNOSIS — E11.69 HYPERLIPIDEMIA ASSOCIATED WITH TYPE 2 DIABETES MELLITUS: ICD-10-CM

## 2021-09-30 DIAGNOSIS — E11.40 TYPE 2 DIABETES MELLITUS WITH DIABETIC NEUROPATHY, WITHOUT LONG-TERM CURRENT USE OF INSULIN: ICD-10-CM

## 2021-09-30 DIAGNOSIS — G62.9 NEUROPATHY: ICD-10-CM

## 2021-09-30 DIAGNOSIS — Z79.899 MEDICATION MANAGEMENT: ICD-10-CM

## 2021-09-30 DIAGNOSIS — I15.2 HYPERTENSION ASSOCIATED WITH TYPE 2 DIABETES MELLITUS: ICD-10-CM

## 2021-09-30 DIAGNOSIS — E55.9 VITAMIN D INSUFFICIENCY: ICD-10-CM

## 2021-09-30 DIAGNOSIS — K76.0 NAFLD (NONALCOHOLIC FATTY LIVER DISEASE): ICD-10-CM

## 2021-09-30 DIAGNOSIS — E78.5 HYPERLIPIDEMIA ASSOCIATED WITH TYPE 2 DIABETES MELLITUS: ICD-10-CM

## 2021-09-30 DIAGNOSIS — G62.9 NEUROPATHY: Primary | ICD-10-CM

## 2021-09-30 DIAGNOSIS — E66.01 CLASS 2 SEVERE OBESITY WITH SERIOUS COMORBIDITY AND BODY MASS INDEX (BMI) OF 35.0 TO 35.9 IN ADULT, UNSPECIFIED OBESITY TYPE: ICD-10-CM

## 2021-09-30 LAB
25(OH)D3+25(OH)D2 SERPL-MCNC: 24 NG/ML (ref 30–96)
ALBUMIN SERPL BCP-MCNC: 3.8 G/DL (ref 3.5–5.2)
ALP SERPL-CCNC: 62 U/L (ref 55–135)
ALT SERPL W/O P-5'-P-CCNC: 18 U/L (ref 10–44)
ANION GAP SERPL CALC-SCNC: 12 MMOL/L (ref 8–16)
AST SERPL-CCNC: 17 U/L (ref 10–40)
BASOPHILS # BLD AUTO: 0.02 K/UL (ref 0–0.2)
BASOPHILS NFR BLD: 0.3 % (ref 0–1.9)
BILIRUB SERPL-MCNC: 0.5 MG/DL (ref 0.1–1)
BUN SERPL-MCNC: 17 MG/DL (ref 8–23)
CALCIUM SERPL-MCNC: 10 MG/DL (ref 8.7–10.5)
CHLORIDE SERPL-SCNC: 102 MMOL/L (ref 95–110)
CHOLEST SERPL-MCNC: 128 MG/DL (ref 120–199)
CHOLEST/HDLC SERPL: 2.5 {RATIO} (ref 2–5)
CO2 SERPL-SCNC: 21 MMOL/L (ref 23–29)
CREAT SERPL-MCNC: 0.8 MG/DL (ref 0.5–1.4)
DIFFERENTIAL METHOD: ABNORMAL
EOSINOPHIL # BLD AUTO: 0.2 K/UL (ref 0–0.5)
EOSINOPHIL NFR BLD: 2.2 % (ref 0–8)
ERYTHROCYTE [DISTWIDTH] IN BLOOD BY AUTOMATED COUNT: 15.5 % (ref 11.5–14.5)
EST. GFR  (AFRICAN AMERICAN): >60 ML/MIN/1.73 M^2
EST. GFR  (NON AFRICAN AMERICAN): >60 ML/MIN/1.73 M^2
ESTIMATED AVG GLUCOSE: 237 MG/DL (ref 68–131)
FOLATE SERPL-MCNC: 15.3 NG/ML (ref 4–24)
GLUCOSE SERPL-MCNC: 135 MG/DL (ref 70–110)
HBA1C MFR BLD: 9.9 % (ref 4–5.6)
HCT VFR BLD AUTO: 38.9 % (ref 40–54)
HDLC SERPL-MCNC: 52 MG/DL (ref 40–75)
HDLC SERPL: 40.6 % (ref 20–50)
HGB BLD-MCNC: 11.9 G/DL (ref 14–18)
IMM GRANULOCYTES # BLD AUTO: 0.02 K/UL (ref 0–0.04)
IMM GRANULOCYTES NFR BLD AUTO: 0.3 % (ref 0–0.5)
LDLC SERPL CALC-MCNC: 57.2 MG/DL (ref 63–159)
LYMPHOCYTES # BLD AUTO: 1.9 K/UL (ref 1–4.8)
LYMPHOCYTES NFR BLD: 24.7 % (ref 18–48)
MAGNESIUM SERPL-MCNC: 1.6 MG/DL (ref 1.6–2.6)
MCH RBC QN AUTO: 21.8 PG (ref 27–31)
MCHC RBC AUTO-ENTMCNC: 30.6 G/DL (ref 32–36)
MCV RBC AUTO: 71 FL (ref 82–98)
MONOCYTES # BLD AUTO: 0.6 K/UL (ref 0.3–1)
MONOCYTES NFR BLD: 7.5 % (ref 4–15)
NEUTROPHILS # BLD AUTO: 4.9 K/UL (ref 1.8–7.7)
NEUTROPHILS NFR BLD: 65 % (ref 38–73)
NONHDLC SERPL-MCNC: 76 MG/DL
NRBC BLD-RTO: 0 /100 WBC
PLATELET # BLD AUTO: 268 K/UL (ref 150–450)
PMV BLD AUTO: 11.6 FL (ref 9.2–12.9)
POTASSIUM SERPL-SCNC: 4.3 MMOL/L (ref 3.5–5.1)
PROT SERPL-MCNC: 7.2 G/DL (ref 6–8.4)
RBC # BLD AUTO: 5.45 M/UL (ref 4.6–6.2)
SODIUM SERPL-SCNC: 135 MMOL/L (ref 136–145)
TRIGL SERPL-MCNC: 94 MG/DL (ref 30–150)
TSH SERPL DL<=0.005 MIU/L-ACNC: 2.21 UIU/ML (ref 0.4–4)
VIT B12 SERPL-MCNC: 409 PG/ML (ref 210–950)
WBC # BLD AUTO: 7.57 K/UL (ref 3.9–12.7)

## 2021-09-30 PROCEDURE — 82607 VITAMIN B-12: CPT | Performed by: FAMILY MEDICINE

## 2021-09-30 PROCEDURE — 3288F PR FALLS RISK ASSESSMENT DOCUMENTED: ICD-10-PCS | Mod: CPTII,S$GLB,, | Performed by: FAMILY MEDICINE

## 2021-09-30 PROCEDURE — 3046F HEMOGLOBIN A1C LEVEL >9.0%: CPT | Mod: CPTII,S$GLB,, | Performed by: FAMILY MEDICINE

## 2021-09-30 PROCEDURE — 36415 COLL VENOUS BLD VENIPUNCTURE: CPT | Performed by: FAMILY MEDICINE

## 2021-09-30 PROCEDURE — 1125F PR PAIN SEVERITY QUANTIFIED, PAIN PRESENT: ICD-10-PCS | Mod: CPTII,S$GLB,, | Performed by: FAMILY MEDICINE

## 2021-09-30 PROCEDURE — 3046F PR MOST RECENT HEMOGLOBIN A1C LEVEL > 9.0%: ICD-10-PCS | Mod: CPTII,S$GLB,, | Performed by: FAMILY MEDICINE

## 2021-09-30 PROCEDURE — 3078F PR MOST RECENT DIASTOLIC BLOOD PRESSURE < 80 MM HG: ICD-10-PCS | Mod: CPTII,S$GLB,, | Performed by: FAMILY MEDICINE

## 2021-09-30 PROCEDURE — 1101F PR PT FALLS ASSESS DOC 0-1 FALLS W/OUT INJ PAST YR: ICD-10-PCS | Mod: CPTII,S$GLB,, | Performed by: FAMILY MEDICINE

## 2021-09-30 PROCEDURE — 99999 PR PBB SHADOW E&M-EST. PATIENT-LVL V: CPT | Mod: PBBFAC,,, | Performed by: FAMILY MEDICINE

## 2021-09-30 PROCEDURE — 99499 RISK ADDL DX/OHS AUDIT: ICD-10-PCS | Mod: S$GLB,,, | Performed by: FAMILY MEDICINE

## 2021-09-30 PROCEDURE — 99999 PR PBB SHADOW E&M-EST. PATIENT-LVL V: ICD-10-PCS | Mod: PBBFAC,,, | Performed by: FAMILY MEDICINE

## 2021-09-30 PROCEDURE — 84443 ASSAY THYROID STIM HORMONE: CPT | Performed by: FAMILY MEDICINE

## 2021-09-30 PROCEDURE — 1157F PR ADVANCE CARE PLAN OR EQUIV PRESENT IN MEDICAL RECORD: ICD-10-PCS | Mod: CPTII,S$GLB,, | Performed by: FAMILY MEDICINE

## 2021-09-30 PROCEDURE — 3078F DIAST BP <80 MM HG: CPT | Mod: CPTII,S$GLB,, | Performed by: FAMILY MEDICINE

## 2021-09-30 PROCEDURE — 85025 COMPLETE CBC W/AUTO DIFF WBC: CPT | Performed by: FAMILY MEDICINE

## 2021-09-30 PROCEDURE — 83735 ASSAY OF MAGNESIUM: CPT | Performed by: FAMILY MEDICINE

## 2021-09-30 PROCEDURE — 3008F PR BODY MASS INDEX (BMI) DOCUMENTED: ICD-10-PCS | Mod: CPTII,S$GLB,, | Performed by: FAMILY MEDICINE

## 2021-09-30 PROCEDURE — 80061 LIPID PANEL: CPT | Performed by: FAMILY MEDICINE

## 2021-09-30 PROCEDURE — 99499 UNLISTED E&M SERVICE: CPT | Mod: S$GLB,,, | Performed by: FAMILY MEDICINE

## 2021-09-30 PROCEDURE — 4010F PR ACE/ARB THEARPY RXD/TAKEN: ICD-10-PCS | Mod: CPTII,S$GLB,, | Performed by: FAMILY MEDICINE

## 2021-09-30 PROCEDURE — 99214 PR OFFICE/OUTPT VISIT, EST, LEVL IV, 30-39 MIN: ICD-10-PCS | Mod: S$GLB,,, | Performed by: FAMILY MEDICINE

## 2021-09-30 PROCEDURE — 1159F MED LIST DOCD IN RCRD: CPT | Mod: CPTII,S$GLB,, | Performed by: FAMILY MEDICINE

## 2021-09-30 PROCEDURE — 3077F SYST BP >= 140 MM HG: CPT | Mod: CPTII,S$GLB,, | Performed by: FAMILY MEDICINE

## 2021-09-30 PROCEDURE — 3077F PR MOST RECENT SYSTOLIC BLOOD PRESSURE >= 140 MM HG: ICD-10-PCS | Mod: CPTII,S$GLB,, | Performed by: FAMILY MEDICINE

## 2021-09-30 PROCEDURE — 3288F FALL RISK ASSESSMENT DOCD: CPT | Mod: CPTII,S$GLB,, | Performed by: FAMILY MEDICINE

## 2021-09-30 PROCEDURE — 83036 HEMOGLOBIN GLYCOSYLATED A1C: CPT | Performed by: FAMILY MEDICINE

## 2021-09-30 PROCEDURE — 1157F ADVNC CARE PLAN IN RCRD: CPT | Mod: CPTII,S$GLB,, | Performed by: FAMILY MEDICINE

## 2021-09-30 PROCEDURE — 1159F PR MEDICATION LIST DOCUMENTED IN MEDICAL RECORD: ICD-10-PCS | Mod: CPTII,S$GLB,, | Performed by: FAMILY MEDICINE

## 2021-09-30 PROCEDURE — 99214 OFFICE O/P EST MOD 30 MIN: CPT | Mod: S$GLB,,, | Performed by: FAMILY MEDICINE

## 2021-09-30 PROCEDURE — 80053 COMPREHEN METABOLIC PANEL: CPT | Performed by: FAMILY MEDICINE

## 2021-09-30 PROCEDURE — 82306 VITAMIN D 25 HYDROXY: CPT | Performed by: FAMILY MEDICINE

## 2021-09-30 PROCEDURE — 82746 ASSAY OF FOLIC ACID SERUM: CPT | Performed by: FAMILY MEDICINE

## 2021-09-30 PROCEDURE — 1125F AMNT PAIN NOTED PAIN PRSNT: CPT | Mod: CPTII,S$GLB,, | Performed by: FAMILY MEDICINE

## 2021-09-30 PROCEDURE — 3008F BODY MASS INDEX DOCD: CPT | Mod: CPTII,S$GLB,, | Performed by: FAMILY MEDICINE

## 2021-09-30 PROCEDURE — 1101F PT FALLS ASSESS-DOCD LE1/YR: CPT | Mod: CPTII,S$GLB,, | Performed by: FAMILY MEDICINE

## 2021-09-30 PROCEDURE — 4010F ACE/ARB THERAPY RXD/TAKEN: CPT | Mod: CPTII,S$GLB,, | Performed by: FAMILY MEDICINE

## 2021-09-30 RX ORDER — LORATADINE 10 MG/1
10 TABLET ORAL DAILY PRN
COMMUNITY
Start: 2021-04-14 | End: 2023-07-26

## 2021-09-30 RX ORDER — GABAPENTIN 300 MG/1
300 CAPSULE ORAL 3 TIMES DAILY
COMMUNITY
Start: 2021-04-16 | End: 2021-09-30

## 2021-09-30 RX ORDER — PREDNISONE 20 MG/1
40 TABLET ORAL DAILY
COMMUNITY
Start: 2021-07-16 | End: 2021-09-30

## 2021-10-01 ENCOUNTER — TELEPHONE (OUTPATIENT)
Dept: ADMINISTRATIVE | Facility: HOSPITAL | Age: 66
End: 2021-10-01

## 2021-10-02 PROBLEM — E11.69 HYPERLIPIDEMIA ASSOCIATED WITH TYPE 2 DIABETES MELLITUS: Status: ACTIVE | Noted: 2021-10-02

## 2021-10-02 PROBLEM — E78.5 HYPERLIPIDEMIA ASSOCIATED WITH TYPE 2 DIABETES MELLITUS: Status: ACTIVE | Noted: 2021-10-02

## 2021-10-03 ENCOUNTER — PATIENT MESSAGE (OUTPATIENT)
Dept: PRIMARY CARE CLINIC | Facility: CLINIC | Age: 66
End: 2021-10-03

## 2021-10-15 ENCOUNTER — LAB VISIT (OUTPATIENT)
Dept: PRIMARY CARE CLINIC | Facility: CLINIC | Age: 66
End: 2021-10-15
Payer: MEDICARE

## 2021-10-15 ENCOUNTER — OFFICE VISIT (OUTPATIENT)
Dept: PRIMARY CARE CLINIC | Facility: CLINIC | Age: 66
End: 2021-10-15
Payer: MEDICARE

## 2021-10-15 VITALS
RESPIRATION RATE: 18 BRPM | TEMPERATURE: 98 F | DIASTOLIC BLOOD PRESSURE: 70 MMHG | BODY MASS INDEX: 35.09 KG/M2 | HEART RATE: 73 BPM | WEIGHT: 223.56 LBS | OXYGEN SATURATION: 100 % | SYSTOLIC BLOOD PRESSURE: 169 MMHG | HEIGHT: 67 IN

## 2021-10-15 DIAGNOSIS — D64.9 ANEMIA, UNSPECIFIED TYPE: ICD-10-CM

## 2021-10-15 DIAGNOSIS — F41.9 ANXIETY AND DEPRESSION: ICD-10-CM

## 2021-10-15 DIAGNOSIS — E55.9 VITAMIN D DEFICIENCY: ICD-10-CM

## 2021-10-15 DIAGNOSIS — E78.5 HYPERLIPIDEMIA ASSOCIATED WITH TYPE 2 DIABETES MELLITUS: ICD-10-CM

## 2021-10-15 DIAGNOSIS — E11.59 HYPERTENSION ASSOCIATED WITH TYPE 2 DIABETES MELLITUS: ICD-10-CM

## 2021-10-15 DIAGNOSIS — G62.9 NEUROPATHY: ICD-10-CM

## 2021-10-15 DIAGNOSIS — E11.40 TYPE 2 DIABETES MELLITUS WITH DIABETIC NEUROPATHY, WITHOUT LONG-TERM CURRENT USE OF INSULIN: Primary | ICD-10-CM

## 2021-10-15 DIAGNOSIS — I15.2 HYPERTENSION ASSOCIATED WITH TYPE 2 DIABETES MELLITUS: ICD-10-CM

## 2021-10-15 DIAGNOSIS — K76.0 NAFLD (NONALCOHOLIC FATTY LIVER DISEASE): ICD-10-CM

## 2021-10-15 DIAGNOSIS — F32.A ANXIETY AND DEPRESSION: ICD-10-CM

## 2021-10-15 DIAGNOSIS — Z12.11 ENCOUNTER FOR SCREENING COLONOSCOPY: ICD-10-CM

## 2021-10-15 DIAGNOSIS — D50.9 MICROCYTIC ANEMIA: ICD-10-CM

## 2021-10-15 DIAGNOSIS — E66.01 CLASS 2 SEVERE OBESITY WITH SERIOUS COMORBIDITY AND BODY MASS INDEX (BMI) OF 35.0 TO 35.9 IN ADULT, UNSPECIFIED OBESITY TYPE: ICD-10-CM

## 2021-10-15 DIAGNOSIS — E11.69 HYPERLIPIDEMIA ASSOCIATED WITH TYPE 2 DIABETES MELLITUS: ICD-10-CM

## 2021-10-15 PROCEDURE — 3046F PR MOST RECENT HEMOGLOBIN A1C LEVEL > 9.0%: ICD-10-PCS | Mod: CPTII,S$GLB,, | Performed by: FAMILY MEDICINE

## 2021-10-15 PROCEDURE — 99214 PR OFFICE/OUTPT VISIT, EST, LEVL IV, 30-39 MIN: ICD-10-PCS | Mod: S$GLB,,, | Performed by: FAMILY MEDICINE

## 2021-10-15 PROCEDURE — 3078F PR MOST RECENT DIASTOLIC BLOOD PRESSURE < 80 MM HG: ICD-10-PCS | Mod: CPTII,S$GLB,, | Performed by: FAMILY MEDICINE

## 2021-10-15 PROCEDURE — 4010F ACE/ARB THERAPY RXD/TAKEN: CPT | Mod: CPTII,S$GLB,, | Performed by: FAMILY MEDICINE

## 2021-10-15 PROCEDURE — 1159F MED LIST DOCD IN RCRD: CPT | Mod: CPTII,S$GLB,, | Performed by: FAMILY MEDICINE

## 2021-10-15 PROCEDURE — 3008F PR BODY MASS INDEX (BMI) DOCUMENTED: ICD-10-PCS | Mod: CPTII,S$GLB,, | Performed by: FAMILY MEDICINE

## 2021-10-15 PROCEDURE — 99999 PR PBB SHADOW E&M-EST. PATIENT-LVL V: CPT | Mod: PBBFAC,,, | Performed by: FAMILY MEDICINE

## 2021-10-15 PROCEDURE — 4010F PR ACE/ARB THEARPY RXD/TAKEN: ICD-10-PCS | Mod: CPTII,S$GLB,, | Performed by: FAMILY MEDICINE

## 2021-10-15 PROCEDURE — 36415 PR COLLECTION VENOUS BLOOD,VENIPUNCTURE: ICD-10-PCS | Mod: S$GLB,,, | Performed by: FAMILY MEDICINE

## 2021-10-15 PROCEDURE — 1159F PR MEDICATION LIST DOCUMENTED IN MEDICAL RECORD: ICD-10-PCS | Mod: CPTII,S$GLB,, | Performed by: FAMILY MEDICINE

## 2021-10-15 PROCEDURE — 3077F PR MOST RECENT SYSTOLIC BLOOD PRESSURE >= 140 MM HG: ICD-10-PCS | Mod: CPTII,S$GLB,, | Performed by: FAMILY MEDICINE

## 2021-10-15 PROCEDURE — 3288F PR FALLS RISK ASSESSMENT DOCUMENTED: ICD-10-PCS | Mod: CPTII,S$GLB,, | Performed by: FAMILY MEDICINE

## 2021-10-15 PROCEDURE — 3008F BODY MASS INDEX DOCD: CPT | Mod: CPTII,S$GLB,, | Performed by: FAMILY MEDICINE

## 2021-10-15 PROCEDURE — 82728 ASSAY OF FERRITIN: CPT | Performed by: FAMILY MEDICINE

## 2021-10-15 PROCEDURE — 3046F HEMOGLOBIN A1C LEVEL >9.0%: CPT | Mod: CPTII,S$GLB,, | Performed by: FAMILY MEDICINE

## 2021-10-15 PROCEDURE — 1125F PR PAIN SEVERITY QUANTIFIED, PAIN PRESENT: ICD-10-PCS | Mod: CPTII,S$GLB,, | Performed by: FAMILY MEDICINE

## 2021-10-15 PROCEDURE — 99999 PR PBB SHADOW E&M-EST. PATIENT-LVL V: ICD-10-PCS | Mod: PBBFAC,,, | Performed by: FAMILY MEDICINE

## 2021-10-15 PROCEDURE — 84466 ASSAY OF TRANSFERRIN: CPT | Performed by: FAMILY MEDICINE

## 2021-10-15 PROCEDURE — 99214 OFFICE O/P EST MOD 30 MIN: CPT | Mod: S$GLB,,, | Performed by: FAMILY MEDICINE

## 2021-10-15 PROCEDURE — 3078F DIAST BP <80 MM HG: CPT | Mod: CPTII,S$GLB,, | Performed by: FAMILY MEDICINE

## 2021-10-15 PROCEDURE — 1157F PR ADVANCE CARE PLAN OR EQUIV PRESENT IN MEDICAL RECORD: ICD-10-PCS | Mod: CPTII,S$GLB,, | Performed by: FAMILY MEDICINE

## 2021-10-15 PROCEDURE — 1157F ADVNC CARE PLAN IN RCRD: CPT | Mod: CPTII,S$GLB,, | Performed by: FAMILY MEDICINE

## 2021-10-15 PROCEDURE — 3288F FALL RISK ASSESSMENT DOCD: CPT | Mod: CPTII,S$GLB,, | Performed by: FAMILY MEDICINE

## 2021-10-15 PROCEDURE — 99499 UNLISTED E&M SERVICE: CPT | Mod: S$GLB,,, | Performed by: FAMILY MEDICINE

## 2021-10-15 PROCEDURE — 1125F AMNT PAIN NOTED PAIN PRSNT: CPT | Mod: CPTII,S$GLB,, | Performed by: FAMILY MEDICINE

## 2021-10-15 PROCEDURE — 99499 RISK ADDL DX/OHS AUDIT: ICD-10-PCS | Mod: S$GLB,,, | Performed by: FAMILY MEDICINE

## 2021-10-15 PROCEDURE — 3077F SYST BP >= 140 MM HG: CPT | Mod: CPTII,S$GLB,, | Performed by: FAMILY MEDICINE

## 2021-10-15 PROCEDURE — 36415 COLL VENOUS BLD VENIPUNCTURE: CPT | Mod: S$GLB,,, | Performed by: FAMILY MEDICINE

## 2021-10-15 PROCEDURE — 1101F PR PT FALLS ASSESS DOC 0-1 FALLS W/OUT INJ PAST YR: ICD-10-PCS | Mod: CPTII,S$GLB,, | Performed by: FAMILY MEDICINE

## 2021-10-15 PROCEDURE — 1101F PT FALLS ASSESS-DOCD LE1/YR: CPT | Mod: CPTII,S$GLB,, | Performed by: FAMILY MEDICINE

## 2021-10-15 RX ORDER — ACETAMINOPHEN 500 MG
1 TABLET ORAL DAILY
Qty: 90 CAPSULE | Refills: 0 | Status: SHIPPED | OUTPATIENT
Start: 2021-10-15 | End: 2023-05-17

## 2021-10-15 RX ORDER — DULAGLUTIDE 0.75 MG/.5ML
0.75 INJECTION, SOLUTION SUBCUTANEOUS
Qty: 4 PEN | Refills: 2 | Status: SHIPPED | OUTPATIENT
Start: 2021-10-15 | End: 2022-06-15 | Stop reason: SDUPTHER

## 2021-10-16 LAB
FERRITIN SERPL-MCNC: 145 NG/ML (ref 20–300)
IRON SERPL-MCNC: 50 UG/DL (ref 45–160)
SATURATED IRON: 13 % (ref 20–50)
TOTAL IRON BINDING CAPACITY: 376 UG/DL (ref 250–450)
TRANSFERRIN SERPL-MCNC: 254 MG/DL (ref 200–375)

## 2021-10-18 ENCOUNTER — TELEPHONE (OUTPATIENT)
Dept: BEHAVIORAL HEALTH | Facility: CLINIC | Age: 66
End: 2021-10-18

## 2021-10-19 DIAGNOSIS — E78.2 MIXED HYPERLIPIDEMIA: ICD-10-CM

## 2021-10-19 DIAGNOSIS — I10 ESSENTIAL HYPERTENSION: ICD-10-CM

## 2021-10-19 RX ORDER — ROSUVASTATIN CALCIUM 10 MG/1
10 TABLET, COATED ORAL DAILY
Qty: 90 TABLET | Refills: 3 | Status: SHIPPED | OUTPATIENT
Start: 2021-10-19 | End: 2022-11-08

## 2021-10-19 RX ORDER — LOSARTAN POTASSIUM 100 MG/1
100 TABLET ORAL DAILY
Qty: 90 TABLET | Refills: 3 | Status: SHIPPED | OUTPATIENT
Start: 2021-10-19 | End: 2022-11-08

## 2021-10-20 ENCOUNTER — CLINICAL SUPPORT (OUTPATIENT)
Dept: BEHAVIORAL HEALTH | Facility: CLINIC | Age: 66
End: 2021-10-20
Payer: COMMERCIAL

## 2021-10-20 ENCOUNTER — PATIENT MESSAGE (OUTPATIENT)
Dept: PRIMARY CARE CLINIC | Facility: CLINIC | Age: 66
End: 2021-10-20

## 2021-10-20 DIAGNOSIS — F41.9 ANXIETY: ICD-10-CM

## 2021-10-20 DIAGNOSIS — F32.A DEPRESSION, UNSPECIFIED DEPRESSION TYPE: Primary | ICD-10-CM

## 2021-10-20 DIAGNOSIS — E11.9 TYPE 2 DIABETES MELLITUS WITHOUT COMPLICATION: ICD-10-CM

## 2021-10-20 PROCEDURE — 90791 PR PSYCHIATRIC DIAGNOSTIC EVALUATION: ICD-10-PCS | Mod: S$GLB,,, | Performed by: SOCIAL WORKER

## 2021-10-20 PROCEDURE — 90791 PSYCH DIAGNOSTIC EVALUATION: CPT | Mod: S$GLB,,, | Performed by: SOCIAL WORKER

## 2021-10-21 ENCOUNTER — TELEPHONE (OUTPATIENT)
Dept: BEHAVIORAL HEALTH | Facility: CLINIC | Age: 66
End: 2021-10-21

## 2021-10-22 PROBLEM — G62.9 NEUROPATHY: Status: ACTIVE | Noted: 2021-10-22

## 2021-10-26 NOTE — PROGRESS NOTES
[facility] BEHAVIORAL HEALTH INTEGRATION INTAKE    DATE:  10/26/2021  REFERRAL SOURCE:  Reyes Trujillo MD  TYPE OF VISIT:  In person  LENGTH OF SESSION: 60  .  HISTORY OF PRESENTING ILLNESS:  Kilo aLm Sr., a 65 y.o. male with history of Major Depressive Disorder, Recurrent, Moderate (F33.1).  Met with patient.     Patient does not currently have a psychiatrist.    Patient does not currently have a therapist.     Previous Psychiatric Outpatient Treatment:  Yes - 20 years ago  Pt is taking no medication for symptoms of depression They are not interested in medication changes.    Current symptoms:  · Depression: dysphoric mood, hopelessness and fatigue.  · Anxiety: denies.  · Insomnia: Patient denies insomnia.  · Fidelia:  denies.  · Psychosis: denies .    PHQ9 10/18/2021   Total Score 19     GAD7 10/18/2021   1. Feeling nervous, anxious, or on edge? 1   2. Not being able to stop or control worrying? 2   3. Worrying too much about different things? 2   4. Trouble relaxing? 3   5. Being so restless that it is hard to sit still? 0   6. Becoming easily annoyed or irritable? 2   7. Feeling afraid as if something awful might happen? 2   8. If you checked off any problems, how difficult have these problems made it for you to do your work, take care of things at home, or get along with other people? 1   CAROLINE-7 Score 12        Current social stressors:   Patient reports that he has a strained relationship with his wife.     Risk assessment:  Patient reports no suicidal ideation  Patient reports no homicidal ideation  Patient reports no self-injurious behavior  Patient reports no violent behavior    PSYCHIATRIC HISTORY:  Previous Psychiatric Hospitalizations:  No  Previous SI/HI:   No  Previous Suicide Attempts:  No  Previous Medication Trials: No Pt has taken no medication for once daily for Anxiety.  Head trauma:  No  History of Trauma:  No  Legal Issues: No  Access to a Gun:  No    SUBSTANCE ABUSE HISTORY:  Tobacco:  No    Alcohol: none  Illicit Substances: No  Misuse of Prescription Medications:  No    MEDICAL HISTORY:  Past Medical History:   Diagnosis Date    Anxiety and depression 11/14/2018    Carpal tunnel syndrome, bilateral 4/9/2013    S/p Lt Carpal tunnel release 11/2011     DJD (degenerative joint disease) of lumbar spine 2/9/2015    Essential hypertension 11/6/2012    Fatty liver 11/26/2012    2012: US of ABD showed: Severe hepatic steatosis and  Hepatomegaly.    Mixed hyperlipidemia 12/5/2014    Morbid obesity 4/9/2013    BMI= 41.2 on 04/09/2013     Type 2 diabetes mellitus with diabetic neuropathy, without long-term current use of insulin 12/5/2014       SOCIAL HISTORY (MARRIAGE, EMPLOYMENT, etc.):  Living Situation: Patient resides in independent housing.  Nuclear/Marriage: Patient is   Supports: Patient recieves some support from his wife.   Education/Vocation: Patient is currently working at [employer]  Pentecostal/Spirituality: Patient is Muslim   Hobbies and Interests: None Reported    PSYCHIATRIC FAMILY HISTORY: none      MENTAL HEALTH STATUS EXAM  General Appearance:  age appropriate, casually dressed, neatly groomed   Speech: normal tone, normal rate, normal pitch, normal volume, pressured      Level of Cooperation: cooperative      Thought Processes: normal and logical   Mood: steady      Thought Content: normal, no suicidality, no homicidality, delusions, or paranoia   Affect: appropriate   Orientation: Oriented x3   Memory: recent >  intact   Attention Span & Concentration: intact   Fund of General Knowledge: intact and appropriate to age and level of education   Abstract Reasoning: interpretation of similarities was abstract   Judgment & Insight: good     Language  intact       IMPRESSION:   My diagnostic impression is Major Depressive Disorder, Recurrent, Moderate (F33.1), as evidenced by sadness and lost of interest.     PROVISIONAL DIAGNOSES:  No diagnosis found.     STRENGTHS AND  LIABILITIES: Strength: Patient accepts guidance/feedback    TREATMENT GOALS: Depression: increasing social contacts (three/week) and reducing excessive guilt    PLAN: In this session a psych evaluation was conducted to get history and process pt's life. CBT will be utilized in future individual  therapy sessions to increase insight, support and behavior modification.     RETURN TO CLINIC: LCSW engaged with the patient for an initial session. Patient reports a history of Depression for 20 years. Patient states that his ex-wife suffers from Schizophrenia and he took care of her, while hiding her manic behaviors for years. Patient reports a history of health issues, and also issues with family dynamic. Patient will follow up with LCSW in 2 weeks.

## 2021-10-29 ENCOUNTER — TELEPHONE (OUTPATIENT)
Dept: PRIMARY CARE CLINIC | Facility: CLINIC | Age: 66
End: 2021-10-29
Payer: MEDICARE

## 2021-10-29 DIAGNOSIS — R20.2 PARESTHESIA: Primary | ICD-10-CM

## 2021-11-01 ENCOUNTER — CLINICAL SUPPORT (OUTPATIENT)
Dept: BEHAVIORAL HEALTH | Facility: CLINIC | Age: 66
End: 2021-11-01
Payer: COMMERCIAL

## 2021-11-01 DIAGNOSIS — F32.A DEPRESSION, UNSPECIFIED DEPRESSION TYPE: Primary | ICD-10-CM

## 2021-11-01 PROCEDURE — 90832 PR PSYCHOTHERAPY W/PATIENT, 30 MIN: ICD-10-PCS | Mod: S$GLB,,, | Performed by: SOCIAL WORKER

## 2021-11-01 PROCEDURE — 90832 PSYTX W PT 30 MINUTES: CPT | Mod: S$GLB,,, | Performed by: SOCIAL WORKER

## 2021-11-02 ENCOUNTER — TELEPHONE (OUTPATIENT)
Dept: BEHAVIORAL HEALTH | Facility: CLINIC | Age: 66
End: 2021-11-02
Payer: MEDICARE

## 2021-11-04 NOTE — PROGRESS NOTES
Individual Psychotherapy (LCSW/PhD)  Kilo Lam Sr.,  11/1/2021    Site: [facility]       Therapeutic Intervention: Met with patient for individual psychotherapy.    Chief complaint/reason for encounter: depression     Interval history and content of current session: LCSW engaged with the patient for a follow up session. Patient was happy because he had a chance to visit with his daughter and two grandchildren. Patient voiced concern about he places the needs of others about his own. LCSW educated the patient on the importance of putting boundaries in place.    Treatment plan:  · Target symptoms: depression  · Why chosen therapy is appropriate versus another modality: relevant to diagnosis, evidence based practice  · Outcome monitoring methods: self-report  · Therapeutic intervention type: behavior modifying psychotherapy    Risk parameters:  Patient reports no suicidal ideation  Patient reports no homicidal ideation  Patient reports no self-injurious behavior  Patient reports no violent behavior    Verbal deficits: None    Patient's response to intervention:  The patient's response to intervention is accepting, motivated.    Progress toward goals and other mental status changes:  The patient's progress toward goals is good.    Diagnosis:     ICD-10-CM ICD-9-CM   1. Depression, unspecified depression type  F32.A 311       Plan: Pt plans to continue individual psychotherapy    Return to clinic: 2 weeks    Length of Service (minutes): 30

## 2021-11-12 ENCOUNTER — TELEPHONE (OUTPATIENT)
Dept: BEHAVIORAL HEALTH | Facility: CLINIC | Age: 66
End: 2021-11-12
Payer: MEDICARE

## 2021-11-16 ENCOUNTER — TELEPHONE (OUTPATIENT)
Dept: BEHAVIORAL HEALTH | Facility: CLINIC | Age: 66
End: 2021-11-16
Payer: MEDICARE

## 2021-11-18 DIAGNOSIS — E11.40 TYPE 2 DIABETES MELLITUS WITH DIABETIC NEUROPATHY, WITHOUT LONG-TERM CURRENT USE OF INSULIN: ICD-10-CM

## 2021-11-18 RX ORDER — GLIPIZIDE 10 MG/1
10 TABLET, FILM COATED, EXTENDED RELEASE ORAL
Qty: 90 TABLET | Refills: 1 | Status: SHIPPED | OUTPATIENT
Start: 2021-11-18 | End: 2022-11-09 | Stop reason: SDUPTHER

## 2021-11-23 ENCOUNTER — TELEPHONE (OUTPATIENT)
Dept: ENDOSCOPY | Facility: HOSPITAL | Age: 66
End: 2021-11-23
Payer: MEDICARE

## 2021-11-23 DIAGNOSIS — Z12.11 SCREENING FOR COLON CANCER: Primary | ICD-10-CM

## 2021-11-23 RX ORDER — POLYETHYLENE GLYCOL 3350, SODIUM SULFATE ANHYDROUS, SODIUM BICARBONATE, SODIUM CHLORIDE, POTASSIUM CHLORIDE 236; 22.74; 6.74; 5.86; 2.97 G/4L; G/4L; G/4L; G/4L; G/4L
4 POWDER, FOR SOLUTION ORAL ONCE
Qty: 4000 ML | Refills: 0 | Status: SHIPPED | OUTPATIENT
Start: 2021-11-23 | End: 2021-11-23

## 2021-11-26 ENCOUNTER — TELEPHONE (OUTPATIENT)
Dept: BEHAVIORAL HEALTH | Facility: CLINIC | Age: 66
End: 2021-11-26
Payer: MEDICARE

## 2022-01-03 DIAGNOSIS — Z01.818 PRE-OP TESTING: Primary | ICD-10-CM

## 2022-01-19 DIAGNOSIS — E11.9 TYPE 2 DIABETES MELLITUS WITHOUT COMPLICATION: ICD-10-CM

## 2022-02-07 ENCOUNTER — PATIENT OUTREACH (OUTPATIENT)
Dept: ADMINISTRATIVE | Facility: HOSPITAL | Age: 67
End: 2022-02-07
Payer: MEDICARE

## 2022-02-17 ENCOUNTER — PATIENT OUTREACH (OUTPATIENT)
Dept: ADMINISTRATIVE | Facility: HOSPITAL | Age: 67
End: 2022-02-17
Payer: MEDICARE

## 2022-06-02 ENCOUNTER — PES CALL (OUTPATIENT)
Dept: ADMINISTRATIVE | Facility: CLINIC | Age: 67
End: 2022-06-02
Payer: MEDICARE

## 2022-06-07 ENCOUNTER — PATIENT MESSAGE (OUTPATIENT)
Dept: PRIMARY CARE CLINIC | Facility: CLINIC | Age: 67
End: 2022-06-07
Payer: MEDICARE

## 2022-06-13 ENCOUNTER — TELEPHONE (OUTPATIENT)
Dept: PRIMARY CARE CLINIC | Facility: CLINIC | Age: 67
End: 2022-06-13
Payer: MEDICARE

## 2022-06-13 NOTE — TELEPHONE ENCOUNTER
----- Message from Medina Diane sent at 6/13/2022  1:45 PM CDT -----  Regarding: Pt called to request a morning work in [place] 6/14-15/22 for hand and feet pain and a referral to physical therapy and pt would like a call back today  Appointment Access Request:    Pt called to request a morning work in [place] 6/14-15/22 for hand and feet pain and a referral to physical therapy and pt would like a call back today    Pt can be reached at [phone]

## 2022-06-13 NOTE — TELEPHONE ENCOUNTER
Spoke to pt informed appt scheduled 6/15 at 0930 with provider in office, PCP booked. Pt verbalized understanding.

## 2022-06-15 ENCOUNTER — OFFICE VISIT (OUTPATIENT)
Dept: PRIMARY CARE CLINIC | Facility: CLINIC | Age: 67
End: 2022-06-15
Payer: MEDICARE

## 2022-06-15 VITALS
OXYGEN SATURATION: 99 % | SYSTOLIC BLOOD PRESSURE: 170 MMHG | DIASTOLIC BLOOD PRESSURE: 72 MMHG | WEIGHT: 235 LBS | HEIGHT: 67 IN | TEMPERATURE: 98 F | HEART RATE: 70 BPM | BODY MASS INDEX: 36.88 KG/M2

## 2022-06-15 DIAGNOSIS — F33.0 MAJOR DEPRESSIVE DISORDER, RECURRENT, MILD: ICD-10-CM

## 2022-06-15 DIAGNOSIS — Z23 ENCOUNTER FOR VACCINATION: ICD-10-CM

## 2022-06-15 DIAGNOSIS — R01.1 MURMUR: ICD-10-CM

## 2022-06-15 DIAGNOSIS — H61.23 BILATERAL IMPACTED CERUMEN: ICD-10-CM

## 2022-06-15 DIAGNOSIS — I10 ESSENTIAL HYPERTENSION: ICD-10-CM

## 2022-06-15 DIAGNOSIS — G62.9 NEUROPATHY: ICD-10-CM
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
## 2022-06-15 DIAGNOSIS — E11.40 TYPE 2 DIABETES MELLITUS WITH DIABETIC NEUROPATHY, WITHOUT LONG-TERM CURRENT USE OF INSULIN: Primary | ICD-10-CM

## 2022-06-15 DIAGNOSIS — R60.0 BILATERAL LEG EDEMA: ICD-10-CM

## 2022-06-15 DIAGNOSIS — L21.9 SEBORRHEA: ICD-10-CM

## 2022-06-15 DIAGNOSIS — Z12.11 SCREENING FOR COLON CANCER: ICD-10-CM

## 2022-06-15 DIAGNOSIS — M79.641 BILATERAL HAND PAIN: ICD-10-CM

## 2022-06-15 DIAGNOSIS — M79.642 BILATERAL HAND PAIN: ICD-10-CM

## 2022-06-15 DIAGNOSIS — Z87.891 HISTORY OF TOBACCO USE: ICD-10-CM

## 2022-06-15 PROCEDURE — 3078F PR MOST RECENT DIASTOLIC BLOOD PRESSURE < 80 MM HG: ICD-10-PCS | Mod: CPTII,S$GLB,, | Performed by: NURSE PRACTITIONER

## 2022-06-15 PROCEDURE — 1101F PR PT FALLS ASSESS DOC 0-1 FALLS W/OUT INJ PAST YR: ICD-10-PCS | Mod: CPTII,S$GLB,, | Performed by: NURSE PRACTITIONER

## 2022-06-15 PROCEDURE — G0009 ADMIN PNEUMOCOCCAL VACCINE: HCPCS | Mod: S$GLB,,, | Performed by: NURSE PRACTITIONER

## 2022-06-15 PROCEDURE — G0009 PNEUMOCOCCAL CONJUGATE VACCINE 20-VALENT: ICD-10-PCS | Mod: S$GLB,,, | Performed by: NURSE PRACTITIONER

## 2022-06-15 PROCEDURE — 99499 RISK ADDL DX/OHS AUDIT: ICD-10-PCS | Mod: S$GLB,,, | Performed by: NURSE PRACTITIONER

## 2022-06-15 PROCEDURE — 4010F ACE/ARB THERAPY RXD/TAKEN: CPT | Mod: CPTII,S$GLB,, | Performed by: NURSE PRACTITIONER

## 2022-06-15 PROCEDURE — 1126F PR PAIN SEVERITY QUANTIFIED, NO PAIN PRESENT: ICD-10-PCS | Mod: CPTII,S$GLB,, | Performed by: NURSE PRACTITIONER

## 2022-06-15 PROCEDURE — 3078F DIAST BP <80 MM HG: CPT | Mod: CPTII,S$GLB,, | Performed by: NURSE PRACTITIONER

## 2022-06-15 PROCEDURE — 4010F PR ACE/ARB THEARPY RXD/TAKEN: ICD-10-PCS | Mod: CPTII,S$GLB,, | Performed by: NURSE PRACTITIONER

## 2022-06-15 PROCEDURE — 1159F PR MEDICATION LIST DOCUMENTED IN MEDICAL RECORD: ICD-10-PCS | Mod: CPTII,S$GLB,, | Performed by: NURSE PRACTITIONER

## 2022-06-15 PROCEDURE — 3008F BODY MASS INDEX DOCD: CPT | Mod: CPTII,S$GLB,, | Performed by: NURSE PRACTITIONER

## 2022-06-15 PROCEDURE — 99214 PR OFFICE/OUTPT VISIT, EST, LEVL IV, 30-39 MIN: ICD-10-PCS | Mod: 25,S$GLB,, | Performed by: NURSE PRACTITIONER

## 2022-06-15 PROCEDURE — 1159F MED LIST DOCD IN RCRD: CPT | Mod: CPTII,S$GLB,, | Performed by: NURSE PRACTITIONER

## 2022-06-15 PROCEDURE — 1157F ADVNC CARE PLAN IN RCRD: CPT | Mod: CPTII,S$GLB,, | Performed by: NURSE PRACTITIONER

## 2022-06-15 PROCEDURE — 99999 PR PBB SHADOW E&M-EST. PATIENT-LVL V: ICD-10-PCS | Mod: PBBFAC,,, | Performed by: NURSE PRACTITIONER

## 2022-06-15 PROCEDURE — 3288F PR FALLS RISK ASSESSMENT DOCUMENTED: ICD-10-PCS | Mod: CPTII,S$GLB,, | Performed by: NURSE PRACTITIONER

## 2022-06-15 PROCEDURE — 1157F PR ADVANCE CARE PLAN OR EQUIV PRESENT IN MEDICAL RECORD: ICD-10-PCS | Mod: CPTII,S$GLB,, | Performed by: NURSE PRACTITIONER

## 2022-06-15 PROCEDURE — 99499 UNLISTED E&M SERVICE: CPT | Mod: S$GLB,,, | Performed by: NURSE PRACTITIONER

## 2022-06-15 PROCEDURE — 99214 OFFICE O/P EST MOD 30 MIN: CPT | Mod: 25,S$GLB,, | Performed by: NURSE PRACTITIONER

## 2022-06-15 PROCEDURE — 90677 PNEUMOCOCCAL CONJUGATE VACCINE 20-VALENT: ICD-10-PCS | Mod: S$GLB,,, | Performed by: NURSE PRACTITIONER

## 2022-06-15 PROCEDURE — 99999 PR PBB SHADOW E&M-EST. PATIENT-LVL V: CPT | Mod: PBBFAC,,, | Performed by: NURSE PRACTITIONER

## 2022-06-15 PROCEDURE — 1101F PT FALLS ASSESS-DOCD LE1/YR: CPT | Mod: CPTII,S$GLB,, | Performed by: NURSE PRACTITIONER

## 2022-06-15 PROCEDURE — 90677 PCV20 VACCINE IM: CPT | Mod: S$GLB,,, | Performed by: NURSE PRACTITIONER

## 2022-06-15 PROCEDURE — 3008F PR BODY MASS INDEX (BMI) DOCUMENTED: ICD-10-PCS | Mod: CPTII,S$GLB,, | Performed by: NURSE PRACTITIONER

## 2022-06-15 PROCEDURE — 1126F AMNT PAIN NOTED NONE PRSNT: CPT | Mod: CPTII,S$GLB,, | Performed by: NURSE PRACTITIONER

## 2022-06-15 PROCEDURE — 3077F PR MOST RECENT SYSTOLIC BLOOD PRESSURE >= 140 MM HG: ICD-10-PCS | Mod: CPTII,S$GLB,, | Performed by: NURSE PRACTITIONER

## 2022-06-15 PROCEDURE — 3288F FALL RISK ASSESSMENT DOCD: CPT | Mod: CPTII,S$GLB,, | Performed by: NURSE PRACTITIONER

## 2022-06-15 PROCEDURE — 3077F SYST BP >= 140 MM HG: CPT | Mod: CPTII,S$GLB,, | Performed by: NURSE PRACTITIONER

## 2022-06-15 RX ORDER — DULAGLUTIDE 0.75 MG/.5ML
0.75 INJECTION, SOLUTION SUBCUTANEOUS
Qty: 4 PEN | Refills: 2 | Status: SHIPPED | OUTPATIENT
Start: 2022-06-15 | End: 2022-11-09 | Stop reason: SDUPTHER

## 2022-06-15 RX ORDER — DULOXETIN HYDROCHLORIDE 60 MG/1
60 CAPSULE, DELAYED RELEASE ORAL DAILY
Qty: 30 CAPSULE | Refills: 11 | Status: SHIPPED | OUTPATIENT
Start: 2022-06-15 | End: 2022-11-09 | Stop reason: SDUPTHER

## 2022-06-15 RX ORDER — KETOCONAZOLE 20 MG/ML
SHAMPOO, SUSPENSION TOPICAL
Qty: 120 ML | Refills: 1 | Status: SHIPPED | OUTPATIENT
Start: 2022-06-16 | End: 2023-07-26

## 2022-06-15 NOTE — PROGRESS NOTES
Pneumonia vacine 20 given tolerated well. VIS given Patient instructed to wait 15 minutes for monitoring

## 2022-06-15 NOTE — PROGRESS NOTES
Subjective:       Patient ID: Kilo Lam Sr. is a 66 y.o. male.    Chief Complaint: Referral, Hand Pain, and Foot Pain     HPI     Mr. Lam is a 66 year old male patient that presents to clinic for bilateral hand and feet numbness, tingling, and pain. Past medical and surgical history reviewed as listed. PCP is Dr. Trujillo, he is known to me. Last seen by PCP in 10/2021.    Neuropathy- reports bilateral hand and feet numbness,tingling, and pain. Pain is characterized by shooting sensation occasionally, however primarily dull.  Pain is constant. Previously tried gabapentin with no relief in symptoms. Reports adherence with duloxetine 30 mg po daily with minimal relief in symptoms. He is requesting a referral to a hand specialist and physical therapy referral. Denies weakness or history of carpal tunnel syndrome.     Type 2 Diabetes Mellitus- 8 months ago--A1C 9.9.  Reports adherence with glipizide 10 mg po with breakfast and metformin 1000 mg po bid with meals. He has not started Trulicity. Non compliant with diabetes diet.     Depressed mood--reports lack of medication adherence and primary care follow up due to stress associated with pandemic, including lack of finances and work. States mood has improved. 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 Interested in starting therapy again at [facility]. Previously seen by YOHANNES Berman.     Fall- reports fall secondary to dizziness yesterday. States he felt dizzy and when he transitioned to get up, he fell. Denies hitting head. States he landed on arms. Denies episode of hypoglycemia or hypotension? He did not check blood glucose or blood pressure at the time of the episode. Reports bp range is normal at home, 130/70's. States he laid down and took cetirizine and symptoms improved.   Past Medical History:   Diagnosis Date    Anxiety and depression 11/14/2018    Carpal tunnel syndrome, bilateral 4/9/2013    S/p Lt Carpal tunnel release 11/2011     DEVYN  "(degenerative joint disease) of lumbar spine 2/9/2015    Essential hypertension 11/6/2012    Fatty liver 11/26/2012    2012: US of ABD showed: Severe hepatic steatosis and  Hepatomegaly.    Mixed hyperlipidemia 12/5/2014    Morbid obesity 4/9/2013    BMI= 41.2 on 04/09/2013     Type 2 diabetes mellitus with diabetic neuropathy, without long-term current use of insulin 12/5/2014      Past Surgical History:   Procedure Laterality Date    CARPAL TUNNEL RELEASE Left 11/2011    no relief of hand symptoms    COLONOSCOPY  2004    normal, repeat in 7 yrs    COLONOSCOPY N/A 3/27/2019    Procedure: COLONOSCOPY;  Surgeon: Smooth German MD;  Location: [facility] ([address] [place] [address]);  Service: Endoscopy;  Laterality: N/A;    INCISIONAL HERNIA REPAIR Left 12/2012    inguinal    SHOULDER SURGERY Left 1972      Family History   Problem Relation Age of Onset    Diabetes Mother     Diabetes Father     Heart failure Father     Cancer Daughter 14        Leukemia, passed    Cancer Other         niece, Lung cancer    Liver disease Neg Hx     Colon cancer Neg Hx       Review of patient's allergies indicates:   Allergen Reactions    No known drug allergies      Review of Systems   Constitutional: Negative for fatigue and fever.   Respiratory: Negative for shortness of breath, wheezing and stridor.    Cardiovascular: Negative for chest pain, palpitations and leg swelling.   Gastrointestinal: Negative for nausea and vomiting.   Neurological: Positive for dizziness.       Objective:       Vitals:    06/15/22 0949   BP: (!) 170/72   BP Location: Right arm   Patient Position: Sitting   BP Method: Large (Automatic)   Pulse: 70   Temp: 98 °F (36.7 °C)   TempSrc: Oral   SpO2: 99%   Weight: 106.6 kg (235 lb 0.2 oz)   Height: 5' 7" (1.702 m)      Physical Exam  Vitals and nursing note reviewed.   Constitutional:       General: He is not in acute distress.     Appearance: Normal appearance. He is obese.   HENT:      Head: Normocephalic and " atraumatic.      Right Ear: There is impacted cerumen.      Left Ear: There is impacted cerumen.      Ears:      Comments: Seborrhea noted to bilateral external canal     Nose: Nose normal.      Mouth/Throat:      Mouth: Mucous membranes are moist.      Pharynx: No posterior oropharyngeal erythema.   Eyes:      General:         Right eye: No discharge.      Extraocular Movements: Extraocular movements intact.      Conjunctiva/sclera: Conjunctivae normal.      Pupils: Pupils are equal, round, and reactive to light.   Cardiovascular:      Rate and Rhythm: Normal rate and regular rhythm.      Pulses: Normal pulses.      Heart sounds: Murmur heard.   Pulmonary:      Effort: Pulmonary effort is normal. No respiratory distress.      Breath sounds: Normal breath sounds.   Abdominal:      General: Abdomen is protuberant. Bowel sounds are normal.      Palpations: Abdomen is soft.      Tenderness: There is no abdominal tenderness.   Musculoskeletal:         General: Normal range of motion.      Cervical back: Normal range of motion and neck supple.      Right lower le+ Edema present.      Left lower le+ Edema present.   Lymphadenopathy:      Cervical: No cervical adenopathy.   Skin:     General: Skin is warm and dry.      Capillary Refill: Capillary refill takes less than 2 seconds.   Neurological:      Mental Status: He is alert and oriented to person, place, and time.      Gait: Gait normal.   Psychiatric:         Mood and Affect: Mood normal.         Behavior: Behavior normal.         Lab Results   Component Value Date    WBC 7.57 2021    HGB 11.9 (L) 2021    HCT 38.9 (L) 2021     2021    CHOL 128 2021    TRIG 94 2021    HDL 52 2021    ALT 18 2021    AST 17 2021     (L) 2021    K 4.3 2021     2021    CREATININE 0.8 2021    BUN 17 2021    CO2 21 (L) 2021    TSH 2.213 2021    PSA 0.46 2019    INR  0.9 01/30/2013    HGBA1C 9.9 (H) 09/30/2021      Assessment:       1. Type 2 diabetes mellitus with diabetic neuropathy, without long-term current use of insulin    2. Neuropathy    3. Bilateral hand pain    4. Fall, initial encounter    5. Bilateral leg edema    6. Murmur    7. Seborrhea    8. History of tobacco use    9. Screening for colon cancer    10. Essential hypertension    11. Encounter for vaccination    12. Bilateral impacted cerumen    13. Major depressive disorder, recurrent, mild        Plan:       Type 2 diabetes mellitus with diabetic neuropathy, without long-term current use of insulin  Recommend ADA diet.   Continue daily walking/moderate physical activity for at least 30 minutes daily.   -     Hemoglobin A1C; Future; Expected date: 06/15/2022  -     dulaglutide (TRULICITY) 0.75 mg/0.5 mL pen injector; Inject 0.75 mg into the skin every 7 days.  Dispense: 4 pen; Refill: 2  -     Comprehensive Metabolic Panel; Future; Expected date: 06/15/2022  -     Lipid Panel; Future; Expected date: 06/15/2022  -     Ambulatory referral/consult to Podiatry; Future; Expected date: 06/15/2022    Neuropathy  -     DULoxetine (CYMBALTA) 60 MG capsule; Take 1 capsule (60 mg total) by mouth once daily.  Dispense: 30 capsule; Refill: 11    Bilateral hand pain  -     DULoxetine (CYMBALTA) 60 MG capsule; Take 1 capsule (60 mg total) by mouth once daily.  Dispense: 30 capsule; Refill: 11  -     Ambulatory referral/consult to Physical/Occupational Therapy; Future; Expected date: 06/15/2022  -     Ambulatory referral/consult to Orthopedics; Future; Expected date: 06/15/2022    Fall, initial encounter  Stable.   Fall secondary to dizziness. Will do cardiac work up to check for arrhythmia or abnormality.   Discussed fall precautions such as slow transitioning when changing positions.     Bilateral leg edema  Continue spironolactone 25 mg po daily.  Check echocardiogram.   Reduce sodium intake to less than 2 g per day.      Murmur  -     Echo; Future  -     CBC Auto Differential; Future; Expected date: 06/15/2022    Seborrhea-external ear  -     ketoconazole (NIZORAL) 2 % shampoo; Apply topically twice a week.  Dispense: 120 mL; Refill: 1    History of tobacco use  -     CV AAA Screening; Future    Screening for colon cancer  -     Case Request Endoscopy: COLONOSCOPY    Essential hypertension  Continue Amlodipine 10 mg po daily and Losartan 100 mg po daily.    Encounter for vaccination  -     (In Office Administered) Pneumococcal Conjugate Vaccine (20 Valent) (IM)      Bilateral impacted cerumen  -     Ambulatory referral/consult to ENT; Future; Expected date: 06/15/2022    Major depressive disorder, recurrent, mild  -     Ambulatory referral/consult to Primary Care Behavioral Health (Non-Opioids); Future; Expected date: 06/22/2022      Medication List with Changes/Refills   New Medications    DULOXETINE (CYMBALTA) 60 MG CAPSULE    Take 1 capsule (60 mg total) by mouth once daily.    KETOCONAZOLE (NIZORAL) 2 % SHAMPOO    Apply topically twice a week.   Current Medications    AMLODIPINE (NORVASC) 10 MG TABLET    Take 1 tablet by mouth once daily    ASPIRIN (ECOTRIN) 81 MG EC TABLET    Take 81 mg by mouth once daily.    BETAMETHASONE VALERATE 0.1% (VALISONE) 0.1 % OINT    Apply topically 2 (two) times daily.    BLOOD SUGAR DIAGNOSTIC STRP    1 strip by Misc.(Non-Drug; Combo Route) route before meals and at bedtime as needed.    BLOOD-GLUCOSE METER KIT    To check BG 3 times daily, to use with insurance preferred meter    CETIRIZINE (ZYRTEC) 10 MG TABLET    Take 1 tablet (10 mg total) by mouth every evening.    CHOLECALCIFEROL, VITAMIN D3, (VITAMIN D3) 50 MCG (2,000 UNIT) CAP    Take 1 capsule (2,000 Units total) by mouth once daily.    DICLOFENAC SODIUM (VOLTAREN) 1 % GEL    Apply 2 g topically once daily. For joint and muscle pain    FLUTICASONE (FLONASE) 50 MCG/ACTUATION NASAL SPRAY    1 spray by Nasal route.    GLIPIZIDE  (GLUCOTROL) 10 MG TR24    Take 1 tablet (10 mg total) by mouth daily with breakfast.    LANCETS (LANCETS,ULTRA THIN) MISC    1 application by Misc.(Non-Drug; Combo Route) route before meals and at bedtime as needed.    LORATADINE (CLARITIN) 10 MG TABLET    Take 10 mg by mouth daily as needed.    LOSARTAN (COZAAR) 100 MG TABLET    Take 1 tablet (100 mg total) by mouth once daily.    METFORMIN (GLUCOPHAGE) 1000 MG TABLET    Take 1 tablet (1,000 mg total) by mouth 2 (two) times daily with meals.    ROSUVASTATIN (CRESTOR) 10 MG TABLET    Take 1 tablet (10 mg total) by mouth once daily.    SPIRONOLACTONE (ALDACTONE) 25 MG TABLET    Take 1 tablet (25 mg total) by mouth once daily.   Changed and/or Refilled Medications    Modified Medication Previous Medication    DULAGLUTIDE (TRULICITY) 0.75 MG/0.5 ML PEN INJECTOR dulaglutide (TRULICITY) 0.75 mg/0.5 mL pen injector       Inject 0.75 mg into the skin every 7 days.    Inject 0.75 mg into the skin every 7 days.   Discontinued Medications    DULOXETINE (CYMBALTA) 30 MG CAPSULE    Take 1 capsule (30 mg total) by mouth once daily.       Follow up in about 4 weeks (around 7/13/2022) for with Dr. Trujillo .      Julissa Alcantara, DNP, APRN, FNP-C

## 2022-06-16 ENCOUNTER — LAB VISIT (OUTPATIENT)
Dept: PRIMARY CARE CLINIC | Facility: CLINIC | Age: 67
End: 2022-06-16
Payer: MEDICARE

## 2022-06-16 ENCOUNTER — TELEPHONE (OUTPATIENT)
Dept: BEHAVIORAL HEALTH | Facility: CLINIC | Age: 67
End: 2022-06-16
Payer: MEDICARE

## 2022-06-16 DIAGNOSIS — E11.40 TYPE 2 DIABETES MELLITUS WITH DIABETIC NEUROPATHY, WITHOUT LONG-TERM CURRENT USE OF INSULIN: ICD-10-CM

## 2022-06-16 DIAGNOSIS — R01.1 MURMUR: ICD-10-CM

## 2022-06-16 LAB
ALBUMIN SERPL BCP-MCNC: 3.8 G/DL (ref 3.5–5.2)
ALP SERPL-CCNC: 63 U/L (ref 55–135)
ALT SERPL W/O P-5'-P-CCNC: 16 U/L (ref 10–44)
ANION GAP SERPL CALC-SCNC: 10 MMOL/L (ref 8–16)
AST SERPL-CCNC: 14 U/L (ref 10–40)
BASOPHILS # BLD AUTO: 0.02 K/UL (ref 0–0.2)
BASOPHILS NFR BLD: 0.3 % (ref 0–1.9)
BILIRUB SERPL-MCNC: 0.4 MG/DL (ref 0.1–1)
BUN SERPL-MCNC: 16 MG/DL (ref 8–23)
CALCIUM SERPL-MCNC: 9.6 MG/DL (ref 8.7–10.5)
CHLORIDE SERPL-SCNC: 104 MMOL/L (ref 95–110)
CHOLEST SERPL-MCNC: 142 MG/DL (ref 120–199)
CHOLEST/HDLC SERPL: 2.5 {RATIO} (ref 2–5)
CO2 SERPL-SCNC: 25 MMOL/L (ref 23–29)
CREAT SERPL-MCNC: 0.9 MG/DL (ref 0.5–1.4)
DIFFERENTIAL METHOD: ABNORMAL
EOSINOPHIL # BLD AUTO: 0.2 K/UL (ref 0–0.5)
EOSINOPHIL NFR BLD: 3.1 % (ref 0–8)
ERYTHROCYTE [DISTWIDTH] IN BLOOD BY AUTOMATED COUNT: 14.2 % (ref 11.5–14.5)
EST. GFR  (AFRICAN AMERICAN): >60 ML/MIN/1.73 M^2
EST. GFR  (NON AFRICAN AMERICAN): >60 ML/MIN/1.73 M^2
ESTIMATED AVG GLUCOSE: 258 MG/DL (ref 68–131)
GLUCOSE SERPL-MCNC: 195 MG/DL (ref 70–110)
HBA1C MFR BLD: 10.6 % (ref 4–5.6)
HCT VFR BLD AUTO: 41 % (ref 40–54)
HDLC SERPL-MCNC: 57 MG/DL (ref 40–75)
HDLC SERPL: 40.1 % (ref 20–50)
HGB BLD-MCNC: 12 G/DL (ref 14–18)
IMM GRANULOCYTES # BLD AUTO: 0.02 K/UL (ref 0–0.04)
IMM GRANULOCYTES NFR BLD AUTO: 0.3 % (ref 0–0.5)
LDLC SERPL CALC-MCNC: 68 MG/DL (ref 63–159)
LYMPHOCYTES # BLD AUTO: 2.8 K/UL (ref 1–4.8)
LYMPHOCYTES NFR BLD: 36.9 % (ref 18–48)
MCH RBC QN AUTO: 22 PG (ref 27–31)
MCHC RBC AUTO-ENTMCNC: 29.3 G/DL (ref 32–36)
MCV RBC AUTO: 75 FL (ref 82–98)
MONOCYTES # BLD AUTO: 0.6 K/UL (ref 0.3–1)
MONOCYTES NFR BLD: 8 % (ref 4–15)
NEUTROPHILS # BLD AUTO: 3.9 K/UL (ref 1.8–7.7)
NEUTROPHILS NFR BLD: 51.4 % (ref 38–73)
NONHDLC SERPL-MCNC: 85 MG/DL
NRBC BLD-RTO: 0 /100 WBC
PLATELET # BLD AUTO: 255 K/UL (ref 150–450)
PMV BLD AUTO: 12.1 FL (ref 9.2–12.9)
POTASSIUM SERPL-SCNC: 4.6 MMOL/L (ref 3.5–5.1)
PROT SERPL-MCNC: 7.1 G/DL (ref 6–8.4)
RBC # BLD AUTO: 5.46 M/UL (ref 4.6–6.2)
SODIUM SERPL-SCNC: 139 MMOL/L (ref 136–145)
TRIGL SERPL-MCNC: 85 MG/DL (ref 30–150)
WBC # BLD AUTO: 7.64 K/UL (ref 3.9–12.7)

## 2022-06-16 PROCEDURE — 80061 LIPID PANEL: CPT | Performed by: NURSE PRACTITIONER

## 2022-06-16 PROCEDURE — 83036 HEMOGLOBIN GLYCOSYLATED A1C: CPT | Performed by: NURSE PRACTITIONER

## 2022-06-16 PROCEDURE — 36415 PR COLLECTION VENOUS BLOOD,VENIPUNCTURE: ICD-10-PCS | Mod: S$GLB,,, | Performed by: FAMILY MEDICINE

## 2022-06-16 PROCEDURE — 36415 COLL VENOUS BLD VENIPUNCTURE: CPT | Mod: S$GLB,,, | Performed by: FAMILY MEDICINE

## 2022-06-16 PROCEDURE — 85025 COMPLETE CBC W/AUTO DIFF WBC: CPT | Performed by: NURSE PRACTITIONER

## 2022-06-16 PROCEDURE — 80053 COMPREHEN METABOLIC PANEL: CPT | Performed by: NURSE PRACTITIONER

## 2022-06-16 NOTE — PROGRESS NOTES
Patient, Kilo Lam Sr. (MRN #[id number]), presented with a recorded BMI of 36.81 kg/m^2 and a documented comorbidity(s):  - Diabetes Mellitus Type 2  - Hypertension  to which the severe obesity is a contributing factor. This is consistent with the definition of severe obesity (BMI 35.0-39.9) with comorbidity (ICD-10 E66.01, Z68.35). The patient's severe obesity was monitored, evaluated, addressed and/or treated. This addendum to the medical record is made on 06/16/2022.

## 2022-06-17 ENCOUNTER — TELEPHONE (OUTPATIENT)
Dept: PRIMARY CARE CLINIC | Facility: CLINIC | Age: 67
End: 2022-06-17
Payer: MEDICARE

## 2022-06-17 ENCOUNTER — TELEPHONE (OUTPATIENT)
Dept: BEHAVIORAL HEALTH | Facility: CLINIC | Age: 67
End: 2022-06-17
Payer: MEDICARE

## 2022-06-17 ENCOUNTER — PES CALL (OUTPATIENT)
Dept: ADMINISTRATIVE | Facility: CLINIC | Age: 67
End: 2022-06-17
Payer: MEDICARE

## 2022-06-17 NOTE — PROGRESS NOTES
CHW made a second attempt to contact patient regarding the referral.  CHW left a message asking patient to return her telephone call.

## 2022-06-21 ENCOUNTER — TELEPHONE (OUTPATIENT)
Dept: BEHAVIORAL HEALTH | Facility: CLINIC | Age: 67
End: 2022-06-21
Payer: MEDICARE

## 2022-06-22 ENCOUNTER — TELEPHONE (OUTPATIENT)
Dept: BEHAVIORAL HEALTH | Facility: CLINIC | Age: 67
End: 2022-06-22
Payer: MEDICARE

## 2022-06-22 NOTE — PROGRESS NOTES
CHW made a fourth attempt to contact patient regarding the referral.  CHW was unable to leave a voicemail message.

## 2022-06-23 ENCOUNTER — TELEPHONE (OUTPATIENT)
Dept: BEHAVIORAL HEALTH | Facility: CLINIC | Age: 67
End: 2022-06-23
Payer: MEDICARE

## 2022-07-06 ENCOUNTER — TELEPHONE (OUTPATIENT)
Dept: BEHAVIORAL HEALTH | Facility: CLINIC | Age: 67
End: 2022-07-06
Payer: MEDICARE

## 2022-07-06 NOTE — PROGRESS NOTES
CHW made a second attempt to contact patient to schedule a follow up appointment.  CHW left a message asking patient to return her telephone call.

## 2022-07-06 NOTE — PROGRESS NOTES
CHW contacted patient to schedule a follow up appointment.  CHW left a message asking patient to return her telephone call.

## 2022-07-07 ENCOUNTER — TELEPHONE (OUTPATIENT)
Dept: BEHAVIORAL HEALTH | Facility: CLINIC | Age: 67
End: 2022-07-07
Payer: MEDICARE

## 2022-07-07 NOTE — PROGRESS NOTES
CHW made a third attempt to contact patient to schedule a follow up appointment.  CHW left a message asking patient to return her telephone call.

## 2022-07-11 DIAGNOSIS — E11.40 TYPE 2 DIABETES MELLITUS WITH DIABETIC NEUROPATHY, WITHOUT LONG-TERM CURRENT USE OF INSULIN: ICD-10-CM

## 2022-07-11 RX ORDER — METFORMIN HYDROCHLORIDE 1000 MG/1
1000 TABLET ORAL 2 TIMES DAILY WITH MEALS
Qty: 180 TABLET | Refills: 0 | Status: SHIPPED | OUTPATIENT
Start: 2022-07-11 | End: 2022-10-31

## 2022-07-12 ENCOUNTER — OFFICE VISIT (OUTPATIENT)
Dept: OTOLARYNGOLOGY | Facility: CLINIC | Age: 67
End: 2022-07-12
Payer: MEDICARE

## 2022-07-12 VITALS
WEIGHT: 233.44 LBS | SYSTOLIC BLOOD PRESSURE: 185 MMHG | DIASTOLIC BLOOD PRESSURE: 76 MMHG | BODY MASS INDEX: 36.57 KG/M2 | HEART RATE: 77 BPM

## 2022-07-12 DIAGNOSIS — H93.8X3 EAR FULLNESS, BILATERAL: Primary | ICD-10-CM

## 2022-07-12 DIAGNOSIS — H61.23 BILATERAL IMPACTED CERUMEN: ICD-10-CM

## 2022-07-12 PROCEDURE — 3077F SYST BP >= 140 MM HG: CPT | Mod: CPTII,S$GLB,, | Performed by: OTOLARYNGOLOGY

## 2022-07-12 PROCEDURE — 3078F PR MOST RECENT DIASTOLIC BLOOD PRESSURE < 80 MM HG: ICD-10-PCS | Mod: CPTII,S$GLB,, | Performed by: OTOLARYNGOLOGY

## 2022-07-12 PROCEDURE — 1157F ADVNC CARE PLAN IN RCRD: CPT | Mod: CPTII,S$GLB,, | Performed by: OTOLARYNGOLOGY

## 2022-07-12 PROCEDURE — 4010F ACE/ARB THERAPY RXD/TAKEN: CPT | Mod: CPTII,S$GLB,, | Performed by: OTOLARYNGOLOGY

## 2022-07-12 PROCEDURE — 1159F PR MEDICATION LIST DOCUMENTED IN MEDICAL RECORD: ICD-10-PCS | Mod: CPTII,S$GLB,, | Performed by: OTOLARYNGOLOGY

## 2022-07-12 PROCEDURE — 99999 PR PBB SHADOW E&M-EST. PATIENT-LVL V: ICD-10-PCS | Mod: PBBFAC,,, | Performed by: OTOLARYNGOLOGY

## 2022-07-12 PROCEDURE — 3077F PR MOST RECENT SYSTOLIC BLOOD PRESSURE >= 140 MM HG: ICD-10-PCS | Mod: CPTII,S$GLB,, | Performed by: OTOLARYNGOLOGY

## 2022-07-12 PROCEDURE — 3046F PR MOST RECENT HEMOGLOBIN A1C LEVEL > 9.0%: ICD-10-PCS | Mod: CPTII,S$GLB,, | Performed by: OTOLARYNGOLOGY

## 2022-07-12 PROCEDURE — 99999 PR PBB SHADOW E&M-EST. PATIENT-LVL V: CPT | Mod: PBBFAC,,, | Performed by: OTOLARYNGOLOGY

## 2022-07-12 PROCEDURE — 1159F MED LIST DOCD IN RCRD: CPT | Mod: CPTII,S$GLB,, | Performed by: OTOLARYNGOLOGY

## 2022-07-12 PROCEDURE — 1101F PR PT FALLS ASSESS DOC 0-1 FALLS W/OUT INJ PAST YR: ICD-10-PCS | Mod: CPTII,S$GLB,, | Performed by: OTOLARYNGOLOGY

## 2022-07-12 PROCEDURE — 1126F AMNT PAIN NOTED NONE PRSNT: CPT | Mod: CPTII,S$GLB,, | Performed by: OTOLARYNGOLOGY

## 2022-07-12 PROCEDURE — 99203 PR OFFICE/OUTPT VISIT, NEW, LEVL III, 30-44 MIN: ICD-10-PCS | Mod: 25,S$GLB,, | Performed by: OTOLARYNGOLOGY

## 2022-07-12 PROCEDURE — 3046F HEMOGLOBIN A1C LEVEL >9.0%: CPT | Mod: CPTII,S$GLB,, | Performed by: OTOLARYNGOLOGY

## 2022-07-12 PROCEDURE — 3288F PR FALLS RISK ASSESSMENT DOCUMENTED: ICD-10-PCS | Mod: CPTII,S$GLB,, | Performed by: OTOLARYNGOLOGY

## 2022-07-12 PROCEDURE — 1126F PR PAIN SEVERITY QUANTIFIED, NO PAIN PRESENT: ICD-10-PCS | Mod: CPTII,S$GLB,, | Performed by: OTOLARYNGOLOGY

## 2022-07-12 PROCEDURE — 69210 REMOVE IMPACTED EAR WAX UNI: CPT | Mod: S$GLB,,, | Performed by: OTOLARYNGOLOGY

## 2022-07-12 PROCEDURE — 99203 OFFICE O/P NEW LOW 30 MIN: CPT | Mod: 25,S$GLB,, | Performed by: OTOLARYNGOLOGY

## 2022-07-12 PROCEDURE — 3078F DIAST BP <80 MM HG: CPT | Mod: CPTII,S$GLB,, | Performed by: OTOLARYNGOLOGY

## 2022-07-12 PROCEDURE — 1101F PT FALLS ASSESS-DOCD LE1/YR: CPT | Mod: CPTII,S$GLB,, | Performed by: OTOLARYNGOLOGY

## 2022-07-12 PROCEDURE — 3288F FALL RISK ASSESSMENT DOCD: CPT | Mod: CPTII,S$GLB,, | Performed by: OTOLARYNGOLOGY

## 2022-07-12 PROCEDURE — 1157F PR ADVANCE CARE PLAN OR EQUIV PRESENT IN MEDICAL RECORD: ICD-10-PCS | Mod: CPTII,S$GLB,, | Performed by: OTOLARYNGOLOGY

## 2022-07-12 PROCEDURE — 3008F PR BODY MASS INDEX (BMI) DOCUMENTED: ICD-10-PCS | Mod: CPTII,S$GLB,, | Performed by: OTOLARYNGOLOGY

## 2022-07-12 PROCEDURE — 3008F BODY MASS INDEX DOCD: CPT | Mod: CPTII,S$GLB,, | Performed by: OTOLARYNGOLOGY

## 2022-07-12 PROCEDURE — 69210 PR REMOVAL IMPACTED CERUMEN REQUIRING INSTRUMENTATION, UNILATERAL: ICD-10-PCS | Mod: S$GLB,,, | Performed by: OTOLARYNGOLOGY

## 2022-07-12 PROCEDURE — 4010F PR ACE/ARB THEARPY RXD/TAKEN: ICD-10-PCS | Mod: CPTII,S$GLB,, | Performed by: OTOLARYNGOLOGY

## 2022-07-12 NOTE — PROGRESS NOTES
Subjective:       Patient ID: Kilo Lam Sr. is a 66 y.o. male.    Chief Complaint: Ear Fullness    Ear Fullness   There is pain in both ears. This is a new problem. The current episode started 1 to 4 weeks ago (Referred by PCP following visit on 6/15/2022.). The problem occurs constantly. The problem has been waxing and waning. There has been no fever. The patient is experiencing no pain. Associated symptoms include hearing loss. Pertinent negatives include no coughing, diarrhea, ear discharge, headaches, neck pain, rash, rhinorrhea or vomiting. He has tried nothing for the symptoms. His past medical history is significant for hearing loss.     Review of Systems   Constitutional: Negative for activity change, appetite change and fever.   HENT: Positive for hearing loss. Negative for congestion, ear discharge, ear pain, nosebleeds, postnasal drip, rhinorrhea and sneezing.    Eyes: Negative for redness and visual disturbance.   Respiratory: Negative for apnea, cough, shortness of breath and wheezing.    Cardiovascular: Negative for chest pain and palpitations.   Gastrointestinal: Negative for diarrhea and vomiting.   Genitourinary: Negative for difficulty urinating and frequency.   Musculoskeletal: Negative for arthralgias, back pain, gait problem and neck pain.   Skin: Negative for color change and rash.   Neurological: Negative for dizziness, speech difficulty, weakness and headaches.   Hematological: Negative for adenopathy. Does not bruise/bleed easily.   Psychiatric/Behavioral: Negative for agitation and behavioral problems.       Objective:        Constitutional:   Vital signs are normal. He appears well-developed and well-nourished. He is active. Normal speech.      Head:  Normocephalic and atraumatic. Salivary glands normal.  Facial strength is normal.      Nose:  Nose normal including turbinates, nasal mucosa, sinuses and nasal septum.     Mouth/Throat  Oropharynx clear and moist without lesions or  asymmetry and normal uvula midline.     Neck:  Neck normal without thyromegaly masses, asymmetry, normal tracheal structure, crepitus, and tenderness, thyroid normal, trachea normal, phonation normal and full range of motion with neck supple.     Psychiatric:   He has a normal mood and affect. His speech is normal and behavior is normal.     Neurological:   He has neurological normal, alert and oriented.     Skin:   No abrasions, lacerations, lesions, or rashes.         PROCEDURE NOTE:  Ceruminosis is noted in both EACs.  Wax was removed by manual debridement and suctioning utilizing the assistance of binocular microscopy revealing EACs and TMs WNL. The patient tolerated this procedure without difficulty. The subjective decrease noted in hearing pre-cleaning was resolved post-cleaning.     Assessment:       1. Ear fullness, bilateral    2. Bilateral impacted cerumen        Plan:       1. Hearing conservation strongly recommended.  2. Trial of amplification is not currently indicated.  3. Re-check of hearing after 68 years of age.  4. F/U with PCP as per schedule.

## 2022-07-13 ENCOUNTER — TELEPHONE (OUTPATIENT)
Dept: ORTHOPEDICS | Facility: CLINIC | Age: 67
End: 2022-07-13
Payer: MEDICARE

## 2022-07-13 DIAGNOSIS — R52 PAIN: Primary | ICD-10-CM

## 2022-07-13 NOTE — TELEPHONE ENCOUNTER
I have attempted without success to contact this patient by phone. Left VM for patient to contact office to schedule x-ray appointment

## 2022-07-15 ENCOUNTER — HOSPITAL ENCOUNTER (OUTPATIENT)
Dept: RADIOLOGY | Facility: OTHER | Age: 67
Discharge: HOME | End: 2022-07-15
Attending: PLASTIC SURGERY
Payer: MEDICARE

## 2022-07-15 ENCOUNTER — TELEPHONE (OUTPATIENT)
Dept: BEHAVIORAL HEALTH | Facility: CLINIC | Age: 67
End: 2022-07-15
Payer: MEDICARE

## 2022-07-15 ENCOUNTER — OFFICE VISIT (OUTPATIENT)
Dept: ORTHOPEDICS | Facility: CLINIC | Age: 67
End: 2022-07-15
Payer: MEDICARE

## 2022-07-15 VITALS
BODY MASS INDEX: 36.64 KG/M2 | SYSTOLIC BLOOD PRESSURE: 175 MMHG | WEIGHT: 233.44 LBS | HEIGHT: 67 IN | HEART RATE: 67 BPM | DIASTOLIC BLOOD PRESSURE: 81 MMHG

## 2022-07-15 DIAGNOSIS — G56.03 BILATERAL CARPAL TUNNEL SYNDROME: ICD-10-CM

## 2022-07-15 DIAGNOSIS — M25.512 ACUTE PAIN OF LEFT SHOULDER: Primary | ICD-10-CM

## 2022-07-15 DIAGNOSIS — Z98.890 HISTORY OF CARPAL TUNNEL SURGERY OF LEFT WRIST: ICD-10-CM

## 2022-07-15 DIAGNOSIS — R52 PAIN: ICD-10-CM

## 2022-07-15 PROCEDURE — 20526 THER INJECTION CARP TUNNEL: CPT | Mod: 50,S$GLB,, | Performed by: PLASTIC SURGERY

## 2022-07-15 PROCEDURE — 3046F PR MOST RECENT HEMOGLOBIN A1C LEVEL > 9.0%: ICD-10-PCS | Mod: CPTII,S$GLB,, | Performed by: PLASTIC SURGERY

## 2022-07-15 PROCEDURE — 1101F PR PT FALLS ASSESS DOC 0-1 FALLS W/OUT INJ PAST YR: ICD-10-PCS | Mod: CPTII,S$GLB,, | Performed by: PLASTIC SURGERY

## 2022-07-15 PROCEDURE — 3288F PR FALLS RISK ASSESSMENT DOCUMENTED: ICD-10-PCS | Mod: CPTII,S$GLB,, | Performed by: PLASTIC SURGERY

## 2022-07-15 PROCEDURE — 1157F ADVNC CARE PLAN IN RCRD: CPT | Mod: CPTII,S$GLB,, | Performed by: PLASTIC SURGERY

## 2022-07-15 PROCEDURE — 4010F ACE/ARB THERAPY RXD/TAKEN: CPT | Mod: CPTII,S$GLB,, | Performed by: PLASTIC SURGERY

## 2022-07-15 PROCEDURE — 3079F PR MOST RECENT DIASTOLIC BLOOD PRESSURE 80-89 MM HG: ICD-10-PCS | Mod: CPTII,S$GLB,, | Performed by: PLASTIC SURGERY

## 2022-07-15 PROCEDURE — 3077F PR MOST RECENT SYSTOLIC BLOOD PRESSURE >= 140 MM HG: ICD-10-PCS | Mod: CPTII,S$GLB,, | Performed by: PLASTIC SURGERY

## 2022-07-15 PROCEDURE — 73130 XR HAND COMPLETE 3 VIEWS BILATERAL: ICD-10-PCS | Mod: 26,50,, | Performed by: RADIOLOGY

## 2022-07-15 PROCEDURE — 4010F PR ACE/ARB THEARPY RXD/TAKEN: ICD-10-PCS | Mod: CPTII,S$GLB,, | Performed by: PLASTIC SURGERY

## 2022-07-15 PROCEDURE — 73130 X-RAY EXAM OF HAND: CPT | Mod: 26,50,, | Performed by: RADIOLOGY

## 2022-07-15 PROCEDURE — 3077F SYST BP >= 140 MM HG: CPT | Mod: CPTII,S$GLB,, | Performed by: PLASTIC SURGERY

## 2022-07-15 PROCEDURE — 1101F PT FALLS ASSESS-DOCD LE1/YR: CPT | Mod: CPTII,S$GLB,, | Performed by: PLASTIC SURGERY

## 2022-07-15 PROCEDURE — 1159F PR MEDICATION LIST DOCUMENTED IN MEDICAL RECORD: ICD-10-PCS | Mod: CPTII,S$GLB,, | Performed by: PLASTIC SURGERY

## 2022-07-15 PROCEDURE — 99204 PR OFFICE/OUTPT VISIT, NEW, LEVL IV, 45-59 MIN: ICD-10-PCS | Mod: 25,S$GLB,, | Performed by: PLASTIC SURGERY

## 2022-07-15 PROCEDURE — 3008F BODY MASS INDEX DOCD: CPT | Mod: CPTII,S$GLB,, | Performed by: PLASTIC SURGERY

## 2022-07-15 PROCEDURE — 1157F PR ADVANCE CARE PLAN OR EQUIV PRESENT IN MEDICAL RECORD: ICD-10-PCS | Mod: CPTII,S$GLB,, | Performed by: PLASTIC SURGERY

## 2022-07-15 PROCEDURE — 99204 OFFICE O/P NEW MOD 45 MIN: CPT | Mod: 25,S$GLB,, | Performed by: PLASTIC SURGERY

## 2022-07-15 PROCEDURE — 1125F PR PAIN SEVERITY QUANTIFIED, PAIN PRESENT: ICD-10-PCS | Mod: CPTII,S$GLB,, | Performed by: PLASTIC SURGERY

## 2022-07-15 PROCEDURE — 3079F DIAST BP 80-89 MM HG: CPT | Mod: CPTII,S$GLB,, | Performed by: PLASTIC SURGERY

## 2022-07-15 PROCEDURE — 99999 PR PBB SHADOW E&M-EST. PATIENT-LVL V: CPT | Mod: PBBFAC,,, | Performed by: PLASTIC SURGERY

## 2022-07-15 PROCEDURE — 3008F PR BODY MASS INDEX (BMI) DOCUMENTED: ICD-10-PCS | Mod: CPTII,S$GLB,, | Performed by: PLASTIC SURGERY

## 2022-07-15 PROCEDURE — 99999 PR PBB SHADOW E&M-EST. PATIENT-LVL V: ICD-10-PCS | Mod: PBBFAC,,, | Performed by: PLASTIC SURGERY

## 2022-07-15 PROCEDURE — 20526 PR INJECT CARPAL TUNNEL: ICD-10-PCS | Mod: 50,S$GLB,, | Performed by: PLASTIC SURGERY

## 2022-07-15 PROCEDURE — 3288F FALL RISK ASSESSMENT DOCD: CPT | Mod: CPTII,S$GLB,, | Performed by: PLASTIC SURGERY

## 2022-07-15 PROCEDURE — 3046F HEMOGLOBIN A1C LEVEL >9.0%: CPT | Mod: CPTII,S$GLB,, | Performed by: PLASTIC SURGERY

## 2022-07-15 PROCEDURE — 1125F AMNT PAIN NOTED PAIN PRSNT: CPT | Mod: CPTII,S$GLB,, | Performed by: PLASTIC SURGERY

## 2022-07-15 PROCEDURE — 1159F MED LIST DOCD IN RCRD: CPT | Mod: CPTII,S$GLB,, | Performed by: PLASTIC SURGERY

## 2022-07-15 PROCEDURE — 73130 X-RAY EXAM OF HAND: CPT | Mod: TC,50,FY

## 2022-07-15 RX ORDER — DULOXETIN HYDROCHLORIDE 30 MG/1
30 CAPSULE, DELAYED RELEASE ORAL DAILY
COMMUNITY
Start: 2022-06-20 | End: 2023-05-16

## 2022-07-15 RX ORDER — TRIAMCINOLONE ACETONIDE 40 MG/ML
80 INJECTION, SUSPENSION INTRA-ARTICULAR; INTRAMUSCULAR
Status: COMPLETED | OUTPATIENT
Start: 2022-07-15 | End: 2022-07-15

## 2022-07-15 RX ADMIN — TRIAMCINOLONE ACETONIDE 80 MG: 40 INJECTION, SUSPENSION INTRA-ARTICULAR; INTRAMUSCULAR at 09:07

## 2022-07-15 NOTE — PROGRESS NOTES
CHW contacted patient to schedule a follow up appointment.  Patient did not answer his telephone and his voicemail is full.

## 2022-07-15 NOTE — PROGRESS NOTES
Chief Complaint: Pain, Swelling, and Numbness of the Left Hand; Pain, Swelling, and Numbness of the Right Hand; and Tingling      HPI:    Kilo Lam is a right hand dominant 66 y.o. male presenting today for a longstanding history of bilateral hand numbness and tingling as well as stiffness and swelling.  He states that over the last year he has had increasing discomfort within both hands.  He has recently undergone outpatient therapy and has had some improvement.  However he does have numbness and tingling within both hands throughout all digits.  He has had a history of a left carpal tunnel release in 2011. He has a longstanding diabetic with possible diabetic neuropathy.  He has not undergone any recent EMG studies.  He also complains of left back and shoulder pain and would like a referral for physical therapy.  He denies any recent history of trauma.  He does have a history of trauma to both hands while working as a  many years ago.    Past Medical History:   Diagnosis Date    Anxiety and depression 11/14/2018    Carpal tunnel syndrome, bilateral 4/9/2013    S/p Lt Carpal tunnel release 11/2011     DJD (degenerative joint disease) of lumbar spine 2/9/2015    Essential hypertension 11/6/2012    Fatty liver 11/26/2012    2012: US of ABD showed: Severe hepatic steatosis and  Hepatomegaly.    Mixed hyperlipidemia 12/5/2014    Morbid obesity 4/9/2013    BMI= 41.2 on 04/09/2013     Type 2 diabetes mellitus with diabetic neuropathy, without long-term current use of insulin 12/5/2014       Past Surgical History:   Procedure Laterality Date    CARPAL TUNNEL RELEASE Left 11/2011    no relief of hand symptoms    COLONOSCOPY  2004    normal, repeat in 7 yrs    COLONOSCOPY N/A 3/27/2019    Procedure: COLONOSCOPY;  Surgeon: Smooth German MD;  Location: [facility] ([address] [place] [address]);  Service: Endoscopy;  Laterality: N/A;    INCISIONAL HERNIA REPAIR Left 12/2012    inguinal    SHOULDER SURGERY Left  1972        Family History   Problem Relation Age of Onset    Diabetes Mother     Diabetes Father     Heart failure Father     Cancer Daughter 14        Leukemia, passed    Cancer Other         niece, Lung cancer    Liver disease Neg Hx     Colon cancer Neg Hx        Social History     Socioeconomic History    Marital status:      Spouse name: Alicia    Number of children: 5   Tobacco Use    Smoking status: Former Smoker     Quit date: 2009     Years since quittin.4    Smokeless tobacco: Never Used   Substance and Sexual Activity    Alcohol use: No    Drug use: No    Sexual activity: Yes     Partners: Female   Social History Narrative    Retired . Now a .     to Alicia.  5 children.       Review of patient's allergies indicates:   Allergen Reactions    No known drug allergies          Current Outpatient Medications:     amLODIPine (NORVASC) 10 MG tablet, Take 1 tablet by mouth once daily, Disp: 90 tablet, Rfl: 0    aspirin (ECOTRIN) 81 MG EC tablet, Take 81 mg by mouth once daily., Disp: , Rfl:     betamethasone valerate 0.1% (VALISONE) 0.1 % Oint, Apply topically 2 (two) times daily., Disp: 30 g, Rfl: 3    blood sugar diagnostic Strp, 1 strip by Misc.(Non-Drug; Combo Route) route before meals and at bedtime as needed., Disp: 200 strip, Rfl: 4    blood-glucose meter kit, To check BG 3 times daily, to use with insurance preferred meter, Disp: 1 each, Rfl: 0    cetirizine (ZYRTEC) 10 MG tablet, Take 1 tablet (10 mg total) by mouth every evening., Disp: 30 tablet, Rfl: 0    cholecalciferol, vitamin D3, (VITAMIN D3) 50 mcg (2,000 unit) Cap, Take 1 capsule (2,000 Units total) by mouth once daily., Disp: 90 capsule, Rfl: 0    diclofenac sodium (VOLTAREN) 1 % Gel, Apply 2 g topically once daily. For joint and muscle pain, Disp: 100 g, Rfl: 1    dulaglutide (TRULICITY) 0.75 mg/0.5 mL pen injector, Inject 0.75 mg into the skin every 7 days.,  Disp: 4 pen, Rfl: 2    DULoxetine (CYMBALTA) 30 MG capsule, Take 30 mg by mouth once daily., Disp: , Rfl:     DULoxetine (CYMBALTA) 60 MG capsule, Take 1 capsule (60 mg total) by mouth once daily., Disp: 30 capsule, Rfl: 11    fluticasone (FLONASE) 50 mcg/actuation nasal spray, 1 spray by Nasal route., Disp: , Rfl:     glipiZIDE (GLUCOTROL) 10 MG TR24, Take 1 tablet (10 mg total) by mouth daily with breakfast., Disp: 90 tablet, Rfl: 1    ketoconazole (NIZORAL) 2 % shampoo, Apply topically twice a week., Disp: 120 mL, Rfl: 1    lancets (LANCETS,ULTRA THIN) Misc, 1 application by Misc.(Non-Drug; Combo Route) route before meals and at bedtime as needed., Disp: 200 each, Rfl: 4    loratadine (CLARITIN) 10 mg tablet, Take 10 mg by mouth daily as needed., Disp: , Rfl:     losartan (COZAAR) 100 MG tablet, Take 1 tablet (100 mg total) by mouth once daily., Disp: 90 tablet, Rfl: 3    metFORMIN (GLUCOPHAGE) 1000 MG tablet, Take 1 tablet (1,000 mg total) by mouth 2 (two) times daily with meals., Disp: 180 tablet, Rfl: 0    rosuvastatin (CRESTOR) 10 MG tablet, Take 1 tablet (10 mg total) by mouth once daily., Disp: 90 tablet, Rfl: 3    spironolactone (ALDACTONE) 25 MG tablet, Take 1 tablet (25 mg total) by mouth once daily., Disp: 90 tablet, Rfl: 3    Current Facility-Administered Medications:     triamcinolone acetonide injection 80 mg, 80 mg, Other, 1 time in Clinic/HOD, Wood Rodney Jr., MD        Review of Systems:  Constitutional: no fever or chills  ENT: no nasal congestion or sore throat  Respiratory: no cough or shortness of breath  Cardiovascular: no chest pain or palpitations  Gastrointestinal: no nausea or vomiting, PUD, GERD, NSAID intolerance  Genitourinary: no hematuria or dysuria  Integument/Breast: no rash or pruritis  Hematologic/Lymphatic: no easy bruising or lymphadenopathy  Musculoskeletal: see HPI  Neurological: no seizures or tremors  Behavioral/Psych: no auditory or visual  "hallucinations      Objective:      PHYSICAL EXAM:  Vitals:    07/15/22 0839   BP: (!) 175/81   Pulse: 67   Weight: 105.9 kg (233 lb 7.5 oz)   Height: 5' 7" (1.702 m)   PainSc:   7     General Appearance: WDWN, NAD  Neuro/Psych: Mood & affect appropriate  Lungs: Respirations equal and unlabored.   CV: No apparent CV dysfunction, distal pulses intact, RRR  Skin: Intact throughout  Extremities:   Right Hand Exam     Tenderness   Right hand tenderness location: Swelling over the dorsal aspect of the radiocarpal joint.    Range of Motion   Wrist   Extension: abnormal   Flexion: abnormal   Pronation: abnormal   Supination: abnormal     Tests   Tinel's sign (median nerve): positive    Other   Erythema: absent  Scars: present  Sensation: decreased  Pulse: present    Comments:  No thenar hypothenar atrophy he is able to make composite fist is no active triggering with flexion extension of the digits at the PIP joints.      Left Hand Exam     Tenderness   Left hand tenderness location: Swelling over the dorsal aspect the radiocarpal joint.     Range of Motion   Wrist   Extension: abnormal   Flexion: abnormal   Pronation: abnormal   Supination: abnormal     Tests   Tinel's sign (median nerve): positive    Other   Erythema: absent  Scars: present  Sensation: decreased  Pulse: present    Comments:  No thenar hypothenar atrophy he is able to make composite fist is no active triggering with flexion extension of the digits at the PIP joints.              DIAGNOSTICS/IMAGING:    Radiologist's Impression:       EXAMINATION:  XR HAND COMPLETE 3 VIEWS BILATERAL     CLINICAL HISTORY:  . Pain, unspecified     TECHNIQUE:  PA, lateral, and oblique views of both hands were performed.     COMPARISON:  10/20/2011     FINDINGS:  Osseous density at the palmar and radial aspect of the left 5th PIP joint which may be secondary to an avulsion injury of uncertain acuity.  There are degenerative changes at the basal joint, worse on the left than " the right.  Additional mild degenerative change of the interphalangeal joints.  There is small vessel calcific atherosclerosis.  No radiopaque foreign body.     Impression:     Age-indeterminate suspected avulsion fracture of the base of the left 5th distal phalanx.     Degenerative changes of the hand and fingers, slightly worse on the left than the right  Comments: I have personnaly reviewed the imaging and I agree with the above radiologist's report.    I have explained the risks, benefits, and alternatives of the procedure in detail.  The patient voices understanding and all questions have been answered.  The patient agrees to proceed as planned. After a sterile prep of the skin the bilateral carpal tunnel is injected from the volar approach using a 25 gauge needle with a combination of 1cc 1% plain xylocaine and 40 mg of Kenalog each.  The patient is cautioned and immediate relief of pain is secondary to the local anesthetic and will be temporary.  After the anesthetic wears off there may be a increase in pain that may last for a few hours or a few days and they should use ice to help alleviate this flair up of pain.         Assessment:     Encounter Diagnoses   Name Primary?    Bilateral carpal tunnel syndrome     History of carpal tunnel surgery of left wrist     Acute pain of left shoulder Yes              Plan/Discussion:   Kilo was seen today for tingling, pain, swelling, numbness, pain, swelling and numbness.    Diagnoses and all orders for this visit:    Acute pain of left shoulder  -     Cancel: Ambulatory referral/consult to Physical/Occupational Therapy; Future  -     Ambulatory referral/consult to Physical/Occupational Therapy; Future    Bilateral carpal tunnel syndrome  -     Ambulatory referral/consult to Orthopedics  -     EMG W/ ULTRASOUND AND NERVE CONDUCTION TEST 2 Extremities; Future    History of carpal tunnel surgery of left wrist  -     EMG W/ ULTRASOUND AND NERVE CONDUCTION TEST 2  Extremities; Future    Other orders  -     triamcinolone acetonide injection 80 mg         Based on the patient's history complaints I believe that his symptoms may be arising from bilateral carpal tunnel syndrome as well as underlying diabetic neuropathy.  He was also found to have calcification of his arteries bilaterally consistent with peripheral vascular disease.  I discussed this is likely related to his longstanding diabetes.  In addition he was found to have arthritis of the radiocarpal joint with a widened SL ligament interval.  This is likely related to his previous history of trauma to both upper extremities.  I believe that most of his symptoms are arising from nerve compression or his diabetic neuropathy.  It was advised we obtain an EMG of both upper extremities to assess for a possible underlying compression syndrome.  He was offered a corticosteroid injection into each carpal tunnel to help alleviate his symptoms this will be therapeutic and diagnostic.  He was provided a referral for his left back and shoulder pain.  The patient wished to receive the injection today.  Please see the procedure note above.  He will follow up with me after the EMG is performed to discuss results.  All questions concerns were addressed

## 2022-07-15 NOTE — PROGRESS NOTES
CHW made a second attempt to contact patient regarding a follow up appointment.  CHW was unable to leave a voicemail message.

## 2022-07-20 ENCOUNTER — TELEPHONE (OUTPATIENT)
Dept: BEHAVIORAL HEALTH | Facility: CLINIC | Age: 67
End: 2022-07-20
Payer: MEDICARE

## 2022-07-20 NOTE — PROGRESS NOTES
CHW contacted patient to scheduled a follow appointment.  CHW was unable to leave a voicemail message.

## 2022-07-21 ENCOUNTER — TELEPHONE (OUTPATIENT)
Dept: PRIMARY CARE CLINIC | Facility: CLINIC | Age: 67
End: 2022-07-21
Payer: MEDICARE

## 2022-08-16 ENCOUNTER — PES CALL (OUTPATIENT)
Dept: ADMINISTRATIVE | Facility: CLINIC | Age: 67
End: 2022-08-16
Payer: MEDICARE

## 2022-08-26 ENCOUNTER — OFFICE VISIT (OUTPATIENT)
Dept: URGENT CARE | Facility: CLINIC | Age: 67
End: 2022-08-26
Payer: MEDICARE

## 2022-08-26 VITALS
RESPIRATION RATE: 16 BRPM | OXYGEN SATURATION: 98 % | HEART RATE: 70 BPM | WEIGHT: 231 LBS | TEMPERATURE: 98 F | BODY MASS INDEX: 36.26 KG/M2 | DIASTOLIC BLOOD PRESSURE: 58 MMHG | HEIGHT: 67 IN | SYSTOLIC BLOOD PRESSURE: 161 MMHG

## 2022-08-26 DIAGNOSIS — U07.1 COVID-19 VIRUS DETECTED: ICD-10-CM

## 2022-08-26 DIAGNOSIS — U07.1 COVID-19 VIRUS INFECTION: Primary | ICD-10-CM

## 2022-08-26 DIAGNOSIS — Z11.59 ENCOUNTER FOR SCREENING FOR OTHER VIRAL DISEASES: ICD-10-CM

## 2022-08-26 LAB
CTP QC/QA: YES
SARS-COV-2 RDRP RESP QL NAA+PROBE: POSITIVE

## 2022-08-26 PROCEDURE — 1157F PR ADVANCE CARE PLAN OR EQUIV PRESENT IN MEDICAL RECORD: ICD-10-PCS | Mod: CPTII,S$GLB,, | Performed by: PHYSICIAN ASSISTANT

## 2022-08-26 PROCEDURE — 1157F ADVNC CARE PLAN IN RCRD: CPT | Mod: CPTII,S$GLB,, | Performed by: PHYSICIAN ASSISTANT

## 2022-08-26 PROCEDURE — 1159F PR MEDICATION LIST DOCUMENTED IN MEDICAL RECORD: ICD-10-PCS | Mod: CPTII,S$GLB,, | Performed by: PHYSICIAN ASSISTANT

## 2022-08-26 PROCEDURE — 99213 OFFICE O/P EST LOW 20 MIN: CPT | Mod: S$GLB,,, | Performed by: PHYSICIAN ASSISTANT

## 2022-08-26 PROCEDURE — 99213 PR OFFICE/OUTPT VISIT, EST, LEVL III, 20-29 MIN: ICD-10-PCS | Mod: S$GLB,,, | Performed by: PHYSICIAN ASSISTANT

## 2022-08-26 PROCEDURE — 3046F PR MOST RECENT HEMOGLOBIN A1C LEVEL > 9.0%: ICD-10-PCS | Mod: CPTII,S$GLB,, | Performed by: PHYSICIAN ASSISTANT

## 2022-08-26 PROCEDURE — 3078F PR MOST RECENT DIASTOLIC BLOOD PRESSURE < 80 MM HG: ICD-10-PCS | Mod: CPTII,S$GLB,, | Performed by: PHYSICIAN ASSISTANT

## 2022-08-26 PROCEDURE — 1126F AMNT PAIN NOTED NONE PRSNT: CPT | Mod: CPTII,S$GLB,, | Performed by: PHYSICIAN ASSISTANT

## 2022-08-26 PROCEDURE — 1126F PR PAIN SEVERITY QUANTIFIED, NO PAIN PRESENT: ICD-10-PCS | Mod: CPTII,S$GLB,, | Performed by: PHYSICIAN ASSISTANT

## 2022-08-26 PROCEDURE — 1160F RVW MEDS BY RX/DR IN RCRD: CPT | Mod: CPTII,S$GLB,, | Performed by: PHYSICIAN ASSISTANT

## 2022-08-26 PROCEDURE — 3008F BODY MASS INDEX DOCD: CPT | Mod: CPTII,S$GLB,, | Performed by: PHYSICIAN ASSISTANT

## 2022-08-26 PROCEDURE — U0002 COVID-19 LAB TEST NON-CDC: HCPCS | Mod: QW,S$GLB,, | Performed by: PHYSICIAN ASSISTANT

## 2022-08-26 PROCEDURE — 4010F ACE/ARB THERAPY RXD/TAKEN: CPT | Mod: CPTII,S$GLB,, | Performed by: PHYSICIAN ASSISTANT

## 2022-08-26 PROCEDURE — 3046F HEMOGLOBIN A1C LEVEL >9.0%: CPT | Mod: CPTII,S$GLB,, | Performed by: PHYSICIAN ASSISTANT

## 2022-08-26 PROCEDURE — 3008F PR BODY MASS INDEX (BMI) DOCUMENTED: ICD-10-PCS | Mod: CPTII,S$GLB,, | Performed by: PHYSICIAN ASSISTANT

## 2022-08-26 PROCEDURE — 4010F PR ACE/ARB THEARPY RXD/TAKEN: ICD-10-PCS | Mod: CPTII,S$GLB,, | Performed by: PHYSICIAN ASSISTANT

## 2022-08-26 PROCEDURE — U0002: ICD-10-PCS | Mod: QW,S$GLB,, | Performed by: PHYSICIAN ASSISTANT

## 2022-08-26 PROCEDURE — 3078F DIAST BP <80 MM HG: CPT | Mod: CPTII,S$GLB,, | Performed by: PHYSICIAN ASSISTANT

## 2022-08-26 PROCEDURE — 3077F SYST BP >= 140 MM HG: CPT | Mod: CPTII,S$GLB,, | Performed by: PHYSICIAN ASSISTANT

## 2022-08-26 PROCEDURE — 1160F PR REVIEW ALL MEDS BY PRESCRIBER/CLIN PHARMACIST DOCUMENTED: ICD-10-PCS | Mod: CPTII,S$GLB,, | Performed by: PHYSICIAN ASSISTANT

## 2022-08-26 PROCEDURE — 3077F PR MOST RECENT SYSTOLIC BLOOD PRESSURE >= 140 MM HG: ICD-10-PCS | Mod: CPTII,S$GLB,, | Performed by: PHYSICIAN ASSISTANT

## 2022-08-26 PROCEDURE — 1159F MED LIST DOCD IN RCRD: CPT | Mod: CPTII,S$GLB,, | Performed by: PHYSICIAN ASSISTANT

## 2022-08-26 RX ORDER — PROMETHAZINE HYDROCHLORIDE AND DEXTROMETHORPHAN HYDROBROMIDE 6.25; 15 MG/5ML; MG/5ML
5 SYRUP ORAL
Qty: 118 ML | Refills: 0 | Status: SHIPPED | OUTPATIENT
Start: 2022-08-26 | End: 2023-07-26

## 2022-08-26 NOTE — LETTER
[address] [place] [address] ? [place], [address] ? Phone [phone] ? Fax [phone]           Return to Work/School    Patient: Kilo Lam  YOB: 1955   Date: 08/26/2022      To Whom It May Concern:     Kilo Lam was in contact with/seen in my office on 08/26/2022. COVID-19 is present in our communities across the state. Not all patients are eligible or appropriate to be tested. In this situation, your employee meets the following criteria:     Kilo Lam has met the criteria for COVID-19 testing and has a POSITIVE result. He can return to work once they are asymptomatic for 24 hours without the use of fever reducing medications AND at least five days from the start of symptoms (or from the first positive result if they have no symptoms).      If you have any questions or concerns, or if I can be of further assistance, please do not hesitate to contact me.     Sincerely,          Santiago Ortiz PA-C

## 2022-08-26 NOTE — PROGRESS NOTES
"Subjective:       Patient ID: Kilo Lam is a 66 y.o. male.    Vitals:  height is 5' 7" (1.702 m) and weight is 104.8 kg (231 lb). His temperature is 97.7 °F (36.5 °C). His blood pressure is 161/58 (abnormal) and his pulse is 70. His respiration is 16 and oxygen saturation is 98%.     Chief Complaint: Cough    65 y/o M PMHx HTN, T2DM, NAFLD presents with nasal congestion, cough, fatigue x 4 days. He states his wife has similar sxs. He endorses mild SOB. He is fully vaccinated and boosted for COVID-19. He denies fever, myalgias, CP, N/V/D. He hasn't taken anything for his sxs. MEB    Cough  This is a new problem. The current episode started in the past 7 days (Monday). The problem has been gradually worsening. The problem occurs every few minutes. The cough is productive of sputum. Associated symptoms include postnasal drip and shortness of breath. Pertinent negatives include no chest pain, fever, headaches, myalgias, rash or sore throat. He has tried prescription cough suppressant (Tylenol;aleve) for the symptoms. The treatment provided mild relief. His past medical history is significant for bronchitis. There is no history of asthma.       Constitution: Positive for fatigue. Negative for fever.   HENT: Positive for congestion and postnasal drip. Negative for sore throat.    Neck: Negative for neck pain.   Cardiovascular: Negative for chest pain.   Respiratory: Positive for cough and shortness of breath.    Gastrointestinal: Positive for constipation. Negative for nausea, vomiting and diarrhea.   Musculoskeletal: Negative for muscle ache.   Skin: Negative for rash.   Allergic/Immunologic: Positive for seasonal allergies and immunizations up-to-date.   Neurological: Negative for headaches.       Objective:      Physical Exam   Constitutional: He is oriented to person, place, and time. He appears well-developed. He is cooperative.  Non-toxic appearance. He does not appear ill. No distress.   HENT:   Head: " Normocephalic and atraumatic.   Ears:   Right Ear: Hearing, tympanic membrane, external ear and ear canal normal.   Left Ear: Hearing, tympanic membrane, external ear and ear canal normal.   Nose: Nose normal. No mucosal edema, rhinorrhea or nasal deformity. No epistaxis. Right sinus exhibits no maxillary sinus tenderness and no frontal sinus tenderness. Left sinus exhibits no maxillary sinus tenderness and no frontal sinus tenderness.   Mouth/Throat: Uvula is midline, oropharynx is clear and moist and mucous membranes are normal. No trismus in the jaw. Normal dentition. No uvula swelling. No oropharyngeal exudate, posterior oropharyngeal edema or posterior oropharyngeal erythema.   Eyes: Conjunctivae and lids are normal. No scleral icterus.   Neck: Trachea normal and phonation normal. Neck supple. No edema present. No erythema present. No neck rigidity present.   Cardiovascular: Normal rate, regular rhythm and normal pulses.   Murmur heard.   Systolic murmur is present.  Pulmonary/Chest: Effort normal and breath sounds normal. No respiratory distress. He has no decreased breath sounds. He has no rhonchi.   Abdominal: Normal appearance.   Musculoskeletal: Normal range of motion.         General: No deformity. Normal range of motion.   Neurological: He is alert and oriented to person, place, and time. He exhibits normal muscle tone. Coordination normal.   Skin: Skin is warm, dry, intact, not diaphoretic and not pale.   Psychiatric: His speech is normal and behavior is normal. Judgment and thought content normal.   Nursing note and vitals reviewed.          Results for orders placed or performed in visit on 08/26/22   POCT COVID-19 Rapid Screening   Result Value Ref Range    POC Rapid COVID Positive (A) Negative     Acceptable Yes        Assessment:       1. COVID-19 virus infection    2. Encounter for screening for other viral diseases          Plan:       5      COVID-19 virus infection  -      nirmatrelvir-ritonavir 300 mg (150 mg x 2)-100 mg copackaged tablets (EUA); Take 3 tablets by mouth 2 (two) times daily. Each dose contains 2 nirmatrelvir (pink tablets) and 1 ritonavir (white tablet). Take all 3 tablets together  Dispense: 30 tablet; Refill: 0  -     promethazine-dextromethorphan (PROMETHAZINE-DM) 6.25-15 mg/5 mL Syrp; Take 5 mLs by mouth every 4 to 6 hours as needed (cough).  Dispense: 118 mL; Refill: 0    Encounter for screening for other viral diseases  -     POCT COVID-19 Rapid Screening      Patient Instructions   Hold Crestor while taking Paxlovid. May resume Crestor once Paxlovid completed.             Your test was POSITIVE for COVID-19 (coronavirus).       Please isolate yourself at home.  You may leave home and/or return to work once the following conditions are met:    If you were not hospitalized and are not moderately to severely immunocompromised:    More than 5 days since symptoms first appeared AND   More than 24 hours fever free without medications AND   Symptoms are improving   Continue to wear a mask around others for 5 additional days.    If you were hospitalized OR are moderately to severely immunocompromised:   More than 20 days since symptoms first appeared   More than 24 hours fever free without medications   Symptoms have improved    If you had no symptoms but tested positive:   More than 5 days since the date of the first positive test (20 days if moderately to severely immunocompromised). If you develop symptoms, then use the guidelines above.   Continue to wear a mask around others for 5 additional days.      Contact Tracing    As one of the next steps, you will receive a call or text from the [place] Department of Health ([facility]) COVID-19 Tracing Team. See the contact information below so you know not to ignore the health departments call. It is important that you contact them back immediately so they can help.      Contact Tracer Number:  [phone]  Caller  ID for most carriers: [facility]     What is contact tracing?  · Contact tracing is a process that helps identify everyone who has been in close contact with an infected person. Contact tracers let those people know they may have been exposed and guide them on next steps. Confidentiality is important for everyone; no one will be told who may have exposed them to the virus.  · Your involvement is important. The more we know about where and how this virus is spreading, the better chance we have at stopping it from spreading further.  What does exposure mean?  · Exposure means you have been within 6 feet for more than 15 minutes with a person who has or had COVID-19.  What kind of questions do the contact tracers ask?  · A contact tracer will confirm your basic contact information including name, address, phone number, and next of kin, as well as asking about any symptoms you may have had. Theyll also ask you how you think you may have gotten sick, such as places where you may have been exposed to the virus, and people you were with. Those names will never be shared with anyone outside of that call, and will only be used to help trace and stop the spread of the virus.   I have privacy concerns. How will the state use my information?  · Your privacy about your health is important. All calls are completed using call centers that use the appropriate health privacy protection measures (HIPAA compliance), meaning that your patient information is safe. No one will ever ask you any questions related to immigration status. Your health comes first.   Do I have to participate?  · You do not have to participate, but we strongly encourage you to. Contact tracing can help us catch and control new outbreaks as theyre developing to keep your friends and family safe.   What if I dont hear from anyone?  · If you dont receive a call within 24 hours, you can call the number above right away to inquire about your status. That  line is open from 8:00 am - 8:00 p.m., 7 days a week.  Contact tracing saves lives! Together, we have the power to beat this virus and keep our loved ones and neighbors safe.    For more information see CDC link below.      https://www.cdc.gov/coronavirus/2019-ncov/hcp/guidance-prevent-spread.html#precautions        Sources:  [facility] [place], [place] [facility] and [facility]           Sincerely,     Santiago Ortiz PA-C

## 2022-08-26 NOTE — PATIENT INSTRUCTIONS
Hold Crestor while taking Paxlovid. May resume Crestor once Paxlovid completed.             Your test was POSITIVE for COVID-19 (coronavirus).       Please isolate yourself at home.  You may leave home and/or return to work once the following conditions are met:    If you were not hospitalized and are not moderately to severely immunocompromised:   More than 5 days since symptoms first appeared AND  More than 24 hours fever free without medications AND  Symptoms are improving  Continue to wear a mask around others for 5 additional days.    If you were hospitalized OR are moderately to severely immunocompromised:  More than 20 days since symptoms first appeared  More than 24 hours fever free without medications  Symptoms have improved    If you had no symptoms but tested positive:  More than 5 days since the date of the first positive test (20 days if moderately to severely immunocompromised). If you develop symptoms, then use the guidelines above.  Continue to wear a mask around others for 5 additional days.      Contact Tracing    As one of the next steps, you will receive a call or text from the [place] Department of Health ([facility]) COVID-19 Tracing Team. See the contact information below so you know not to ignore the health departments call. It is important that you contact them back immediately so they can help.      Contact Tracer Number:  [phone]  Caller ID for most carriers: [place] Dept Health     What is contact tracing?  Contact tracing is a process that helps identify everyone who has been in close contact with an infected person. Contact tracers let those people know they may have been exposed and guide them on next steps. Confidentiality is important for everyone; no one will be told who may have exposed them to the virus.  Your involvement is important. The more we know about where and how this virus is spreading, the better chance we have at stopping it from spreading further.  What does exposure  mean?  Exposure means you have been within 6 feet for more than 15 minutes with a person who has or had COVID-19.  What kind of questions do the contact tracers ask?  A contact tracer will confirm your basic contact information including name, address, phone number, and next of kin, as well as asking about any symptoms you may have had. Theyll also ask you how you think you may have gotten sick, such as places where you may have been exposed to the virus, and people you were with. Those names will never be shared with anyone outside of that call, and will only be used to help trace and stop the spread of the virus.   I have privacy concerns. How will the state use my information?  Your privacy about your health is important. All calls are completed using call centers that use the appropriate health privacy protection measures (HIPAA compliance), meaning that your patient information is safe. No one will ever ask you any questions related to immigration status. Your health comes first.   Do I have to participate?  You do not have to participate, but we strongly encourage you to. Contact tracing can help us catch and control new outbreaks as theyre developing to keep your friends and family safe.   What if I dont hear from anyone?  If you dont receive a call within 24 hours, you can call the number above right away to inquire about your status. That line is open from 8:00 am - 8:00 p.m., 7 days a week.  Contact tracing saves lives! Together, we have the power to beat this virus and keep our loved ones and neighbors safe.    For more information see CDC link below.      https://www.cdc.gov/coronavirus/2019-ncov/hcp/guidance-prevent-spread.html#precautions        Sources:  [facility], [place] [facility] and \[facility]\""           Sincerely,     Santiago Ortiz PA-C

## 2022-09-02 ENCOUNTER — TELEPHONE (OUTPATIENT)
Dept: ORTHOPEDICS | Facility: CLINIC | Age: 67
End: 2022-09-02
Payer: MEDICARE

## 2022-09-02 NOTE — TELEPHONE ENCOUNTER
Left VM advising pt to call office back to reschedule EMG f/u appt due to his EMG has been rescheduled to 9/26/22.

## 2022-09-13 ENCOUNTER — PES CALL (OUTPATIENT)
Dept: ADMINISTRATIVE | Facility: CLINIC | Age: 67
End: 2022-09-13
Payer: MEDICARE

## 2022-09-26 ENCOUNTER — TELEPHONE (OUTPATIENT)
Dept: PHYSICAL MEDICINE AND REHAB | Facility: CLINIC | Age: 67
End: 2022-09-26
Payer: MEDICARE

## 2022-09-26 NOTE — TELEPHONE ENCOUNTER
Called Mr Lam again today with no answer.  I was able to leave a voice message on his answer machine in regards to his 3;00 appointment today.  Office number provided to call the office back to cancel, reschedule or if he will attend.

## 2022-09-28 ENCOUNTER — PATIENT OUTREACH (OUTPATIENT)
Dept: ADMINISTRATIVE | Facility: HOSPITAL | Age: 67
End: 2022-09-28
Payer: MEDICARE

## 2022-09-28 ENCOUNTER — PATIENT MESSAGE (OUTPATIENT)
Dept: ADMINISTRATIVE | Facility: HOSPITAL | Age: 67
End: 2022-09-28
Payer: MEDICARE

## 2022-11-09 ENCOUNTER — OFFICE VISIT (OUTPATIENT)
Dept: PRIMARY CARE CLINIC | Facility: CLINIC | Age: 67
End: 2022-11-09
Payer: MEDICARE

## 2022-11-09 VITALS
SYSTOLIC BLOOD PRESSURE: 152 MMHG | HEART RATE: 70 BPM | BODY MASS INDEX: 36.47 KG/M2 | RESPIRATION RATE: 20 BRPM | WEIGHT: 232.38 LBS | HEIGHT: 67 IN | DIASTOLIC BLOOD PRESSURE: 70 MMHG | TEMPERATURE: 98 F

## 2022-11-09 DIAGNOSIS — I10 ESSENTIAL HYPERTENSION: ICD-10-CM

## 2022-11-09 DIAGNOSIS — R94.31 ABNORMAL EKG: ICD-10-CM

## 2022-11-09 DIAGNOSIS — M79.642 BILATERAL HAND PAIN: ICD-10-CM

## 2022-11-09 DIAGNOSIS — M79.641 BILATERAL HAND PAIN: ICD-10-CM

## 2022-11-09 DIAGNOSIS — G62.9 NEUROPATHY: ICD-10-CM

## 2022-11-09 DIAGNOSIS — E78.2 MIXED HYPERLIPIDEMIA: ICD-10-CM

## 2022-11-09 DIAGNOSIS — R60.0 BILATERAL LEG EDEMA: ICD-10-CM

## 2022-11-09 DIAGNOSIS — E11.65 UNCONTROLLED TYPE 2 DIABETES MELLITUS WITH HYPERGLYCEMIA: Primary | ICD-10-CM

## 2022-11-09 DIAGNOSIS — E11.40 TYPE 2 DIABETES MELLITUS WITH DIABETIC NEUROPATHY, WITHOUT LONG-TERM CURRENT USE OF INSULIN: ICD-10-CM

## 2022-11-09 LAB
ALBUMIN/CREAT UR: 617.2 UG/MG (ref 0–30)
BILIRUB SERPL-MCNC: NORMAL MG/DL
BLOOD URINE, POC: NORMAL
COLOR, POC UA: YELLOW
CREAT UR-MCNC: 128 MG/DL (ref 23–375)
GLUCOSE SERPL-MCNC: 278 MG/DL (ref 70–110)
GLUCOSE UR QL STRIP: 1000
KETONES UR QL STRIP: NORMAL
LEUKOCYTE ESTERASE URINE, POC: NORMAL
MICROALBUMIN UR DL<=1MG/L-MCNC: 790 UG/ML
NITRITE, POC UA: NORMAL
PH, POC UA: 6
PROTEIN, POC: NORMAL
SPECIFIC GRAVITY, POC UA: 1.02
UROBILINOGEN, POC UA: 1

## 2022-11-09 PROCEDURE — 82043 UR ALBUMIN QUANTITATIVE: CPT | Performed by: FAMILY MEDICINE

## 2022-11-09 PROCEDURE — 4010F ACE/ARB THERAPY RXD/TAKEN: CPT | Mod: CPTII,S$GLB,, | Performed by: FAMILY MEDICINE

## 2022-11-09 PROCEDURE — 93005 ELECTROCARDIOGRAM TRACING: CPT | Mod: S$GLB,,, | Performed by: FAMILY MEDICINE

## 2022-11-09 PROCEDURE — 3077F SYST BP >= 140 MM HG: CPT | Mod: CPTII,S$GLB,, | Performed by: FAMILY MEDICINE

## 2022-11-09 PROCEDURE — 1157F ADVNC CARE PLAN IN RCRD: CPT | Mod: CPTII,S$GLB,, | Performed by: FAMILY MEDICINE

## 2022-11-09 PROCEDURE — 3288F FALL RISK ASSESSMENT DOCD: CPT | Mod: CPTII,S$GLB,, | Performed by: FAMILY MEDICINE

## 2022-11-09 PROCEDURE — 99999 PR PBB SHADOW E&M-EST. PATIENT-LVL V: CPT | Mod: PBBFAC,,, | Performed by: FAMILY MEDICINE

## 2022-11-09 PROCEDURE — 99999 PR PBB SHADOW E&M-EST. PATIENT-LVL V: ICD-10-PCS | Mod: PBBFAC,,, | Performed by: FAMILY MEDICINE

## 2022-11-09 PROCEDURE — 1160F PR REVIEW ALL MEDS BY PRESCRIBER/CLIN PHARMACIST DOCUMENTED: ICD-10-PCS | Mod: CPTII,S$GLB,, | Performed by: FAMILY MEDICINE

## 2022-11-09 PROCEDURE — 3078F DIAST BP <80 MM HG: CPT | Mod: CPTII,S$GLB,, | Performed by: FAMILY MEDICINE

## 2022-11-09 PROCEDURE — 1126F PR PAIN SEVERITY QUANTIFIED, NO PAIN PRESENT: ICD-10-PCS | Mod: CPTII,S$GLB,, | Performed by: FAMILY MEDICINE

## 2022-11-09 PROCEDURE — 81001 POCT URINALYSIS, DIPSTICK OR TABLET REAGENT, AUTOMATED, WITH MICROSCOP: ICD-10-PCS | Mod: S$GLB,,, | Performed by: FAMILY MEDICINE

## 2022-11-09 PROCEDURE — 3078F PR MOST RECENT DIASTOLIC BLOOD PRESSURE < 80 MM HG: ICD-10-PCS | Mod: CPTII,S$GLB,, | Performed by: FAMILY MEDICINE

## 2022-11-09 PROCEDURE — 4010F PR ACE/ARB THEARPY RXD/TAKEN: ICD-10-PCS | Mod: CPTII,S$GLB,, | Performed by: FAMILY MEDICINE

## 2022-11-09 PROCEDURE — 1101F PR PT FALLS ASSESS DOC 0-1 FALLS W/OUT INJ PAST YR: ICD-10-PCS | Mod: CPTII,S$GLB,, | Performed by: FAMILY MEDICINE

## 2022-11-09 PROCEDURE — 99214 PR OFFICE/OUTPT VISIT, EST, LEVL IV, 30-39 MIN: ICD-10-PCS | Mod: S$GLB,,, | Performed by: FAMILY MEDICINE

## 2022-11-09 PROCEDURE — 1126F AMNT PAIN NOTED NONE PRSNT: CPT | Mod: CPTII,S$GLB,, | Performed by: FAMILY MEDICINE

## 2022-11-09 PROCEDURE — 3046F PR MOST RECENT HEMOGLOBIN A1C LEVEL > 9.0%: ICD-10-PCS | Mod: CPTII,S$GLB,, | Performed by: FAMILY MEDICINE

## 2022-11-09 PROCEDURE — 82962 GLUCOSE BLOOD TEST: CPT | Mod: S$GLB,,, | Performed by: FAMILY MEDICINE

## 2022-11-09 PROCEDURE — 3008F BODY MASS INDEX DOCD: CPT | Mod: CPTII,S$GLB,, | Performed by: FAMILY MEDICINE

## 2022-11-09 PROCEDURE — 1101F PT FALLS ASSESS-DOCD LE1/YR: CPT | Mod: CPTII,S$GLB,, | Performed by: FAMILY MEDICINE

## 2022-11-09 PROCEDURE — 1159F MED LIST DOCD IN RCRD: CPT | Mod: CPTII,S$GLB,, | Performed by: FAMILY MEDICINE

## 2022-11-09 PROCEDURE — 93010 ELECTROCARDIOGRAM REPORT: CPT | Mod: S$GLB,,, | Performed by: INTERNAL MEDICINE

## 2022-11-09 PROCEDURE — 1160F RVW MEDS BY RX/DR IN RCRD: CPT | Mod: CPTII,S$GLB,, | Performed by: FAMILY MEDICINE

## 2022-11-09 PROCEDURE — 82962 POCT GLUCOSE, HAND-HELD DEVICE: ICD-10-PCS | Mod: S$GLB,,, | Performed by: FAMILY MEDICINE

## 2022-11-09 PROCEDURE — 1157F PR ADVANCE CARE PLAN OR EQUIV PRESENT IN MEDICAL RECORD: ICD-10-PCS | Mod: CPTII,S$GLB,, | Performed by: FAMILY MEDICINE

## 2022-11-09 PROCEDURE — 93005 EKG 12-LEAD: ICD-10-PCS | Mod: S$GLB,,, | Performed by: FAMILY MEDICINE

## 2022-11-09 PROCEDURE — 3077F PR MOST RECENT SYSTOLIC BLOOD PRESSURE >= 140 MM HG: ICD-10-PCS | Mod: CPTII,S$GLB,, | Performed by: FAMILY MEDICINE

## 2022-11-09 PROCEDURE — 3046F HEMOGLOBIN A1C LEVEL >9.0%: CPT | Mod: CPTII,S$GLB,, | Performed by: FAMILY MEDICINE

## 2022-11-09 PROCEDURE — 93010 EKG 12-LEAD: ICD-10-PCS | Mod: S$GLB,,, | Performed by: INTERNAL MEDICINE

## 2022-11-09 PROCEDURE — 99214 OFFICE O/P EST MOD 30 MIN: CPT | Mod: S$GLB,,, | Performed by: FAMILY MEDICINE

## 2022-11-09 PROCEDURE — 1159F PR MEDICATION LIST DOCUMENTED IN MEDICAL RECORD: ICD-10-PCS | Mod: CPTII,S$GLB,, | Performed by: FAMILY MEDICINE

## 2022-11-09 PROCEDURE — 3288F PR FALLS RISK ASSESSMENT DOCUMENTED: ICD-10-PCS | Mod: CPTII,S$GLB,, | Performed by: FAMILY MEDICINE

## 2022-11-09 PROCEDURE — 81001 URINALYSIS AUTO W/SCOPE: CPT | Mod: S$GLB,,, | Performed by: FAMILY MEDICINE

## 2022-11-09 PROCEDURE — 3008F PR BODY MASS INDEX (BMI) DOCUMENTED: ICD-10-PCS | Mod: CPTII,S$GLB,, | Performed by: FAMILY MEDICINE

## 2022-11-09 RX ORDER — ROSUVASTATIN CALCIUM 10 MG/1
10 TABLET, COATED ORAL DAILY
Qty: 90 TABLET | Refills: 0 | Status: SHIPPED | OUTPATIENT
Start: 2022-11-09 | End: 2023-03-29

## 2022-11-09 RX ORDER — DULAGLUTIDE 0.75 MG/.5ML
0.75 INJECTION, SOLUTION SUBCUTANEOUS
Qty: 4 PEN | Refills: 2 | Status: SHIPPED | OUTPATIENT
Start: 2022-11-09 | End: 2023-07-31

## 2022-11-09 RX ORDER — DULOXETIN HYDROCHLORIDE 60 MG/1
60 CAPSULE, DELAYED RELEASE ORAL DAILY
Qty: 90 CAPSULE | Refills: 0 | Status: SHIPPED | OUTPATIENT
Start: 2022-11-09 | End: 2023-05-16

## 2022-11-09 RX ORDER — METFORMIN HYDROCHLORIDE 1000 MG/1
1000 TABLET ORAL 2 TIMES DAILY WITH MEALS
Qty: 180 TABLET | Refills: 0 | Status: SHIPPED | OUTPATIENT
Start: 2022-11-09 | End: 2023-05-16 | Stop reason: SDUPTHER

## 2022-11-09 RX ORDER — LOSARTAN POTASSIUM 100 MG/1
100 TABLET ORAL DAILY
Qty: 90 TABLET | Refills: 0 | Status: SHIPPED | OUTPATIENT
Start: 2022-11-09 | End: 2023-03-29

## 2022-11-09 RX ORDER — GLIPIZIDE 10 MG/1
10 TABLET, FILM COATED, EXTENDED RELEASE ORAL
Qty: 90 TABLET | Refills: 0 | Status: SHIPPED | OUTPATIENT
Start: 2022-11-09 | End: 2023-05-16 | Stop reason: SDUPTHER

## 2022-11-09 RX ORDER — SPIRONOLACTONE 25 MG/1
25 TABLET ORAL DAILY
Qty: 90 TABLET | Refills: 0 | Status: SHIPPED | OUTPATIENT
Start: 2022-11-09 | End: 2023-04-13

## 2022-11-09 RX ORDER — AMLODIPINE BESYLATE 10 MG/1
10 TABLET ORAL DAILY
Qty: 90 TABLET | Refills: 0 | Status: SHIPPED | OUTPATIENT
Start: 2022-11-09 | End: 2023-02-16

## 2022-11-09 NOTE — PROGRESS NOTES
Subjective:       Patient ID: Kilo Lam is a 66 y.o. male.    Chief Complaint: Medication Refill    HPI:  Patient is a 66 year old male with a history of chronic uncontrolled diabetes, hypertension, HLD, BPH, LE edema here for follow up today.  Patient reports non-compliance with medical therapy.  He has not been taking Trulicity, does not like injections.  He is non-compliant with diet and exercise.  Reports urinary frequency, with nocturia x 6.  Denies chest pain or SOB.  Patient with chronic LE edema.    Review of Systems   Constitutional:  Negative for fatigue and unexpected weight change.   Eyes:  Negative for visual disturbance.   Respiratory:  Negative for shortness of breath.    Cardiovascular:  Positive for leg swelling.   Gastrointestinal: Negative.    Endocrine: Positive for polydipsia and polyuria.   Genitourinary:  Positive for frequency. Negative for difficulty urinating.   Neurological:  Positive for numbness (hands). Negative for dizziness and weakness.       Objective:      Physical Exam  Constitutional:       Appearance: He is well-developed. He is obese.   HENT:      Head: Normocephalic and atraumatic.   Neck:      Thyroid: No thyromegaly.   Cardiovascular:      Rate and Rhythm: Normal rate and regular rhythm.      Heart sounds: Normal heart sounds. No murmur heard.  Pulmonary:      Effort: Pulmonary effort is normal. No respiratory distress.      Breath sounds: Normal breath sounds. No wheezing.   Abdominal:      General: Bowel sounds are normal.      Palpations: Abdomen is soft. There is no mass.      Tenderness: There is no abdominal tenderness.   Musculoskeletal:      Cervical back: Normal range of motion and neck supple.      Right lower leg: Edema (3+ edema bilat) present.      Left lower leg: Edema present.   Skin:     General: Skin is warm and dry.   Neurological:      Mental Status: He is alert and oriented to person, place, and time.       EKG: NSR VR 76  Results for orders placed  or performed in visit on 11/09/22   POCT urinalysis, dipstick or tablet reag   Result Value Ref Range    Color, UA Yellow     Spec Grav UA 1.025     pH, UA 6     WBC, UA neg     Nitrite, UA neg     Protein, POC +++/500     Glucose, UA 1,000     Ketones, UA neg     Urobilinogen, UA 1     Bilirubin, POC +++     Blood, UA neg    POCT Glucose, Hand-Held Device   Result Value Ref Range    POC Glucose 278 (A) 70 - 110 MG/DL      Assessment:       Problem List Items Addressed This Visit          Neuro    Neuropathy    Relevant Medications    DULoxetine (CYMBALTA) 60 MG capsule       Cardiac/Vascular    Mixed hyperlipidemia    Relevant Medications    rosuvastatin (CRESTOR) 10 MG tablet       Endocrine    Type 2 diabetes mellitus with diabetic neuropathy, without long-term current use of insulin    Relevant Medications    metFORMIN (GLUCOPHAGE) 1000 MG tablet    glipiZIDE (GLUCOTROL) 10 MG TR24    dulaglutide (TRULICITY) 0.75 mg/0.5 mL pen injector     Other Visit Diagnoses       Uncontrolled type 2 diabetes mellitus with hyperglycemia    -  Primary    Relevant Medications    metFORMIN (GLUCOPHAGE) 1000 MG tablet    glipiZIDE (GLUCOTROL) 10 MG TR24    dulaglutide (TRULICITY) 0.75 mg/0.5 mL pen injector    Other Relevant Orders    Hemoglobin A1C    Microalbumin/Creatinine Ratio, Urine    Bilateral hand pain        Relevant Medications    DULoxetine (CYMBALTA) 60 MG capsule    Essential hypertension        Relevant Medications    amLODIPine (NORVASC) 10 MG tablet    losartan (COZAAR) 100 MG tablet    spironolactone (ALDACTONE) 25 MG tablet    Other Relevant Orders    IN OFFICE EKG 12-LEAD (to Muse)    Bilateral leg edema        Relevant Orders    Ambulatory referral/consult to Cardiology    Abnormal EKG        Relevant Orders    Ambulatory referral/consult to Cardiology            Plan:     Kilo was seen today for medication refill.    Diagnoses and all orders for this visit:    Uncontrolled type 2 diabetes mellitus with  hyperglycemia  Long discussion with patient regarding compliance with medical regimen and long term complications of uncontrolled diabetes.  -     Hemoglobin A1C; Future  -     Microalbumin/Creatinine Ratio, Urine    Neuropathy  -     DULoxetine (CYMBALTA) 60 MG capsule; Take 1 capsule (60 mg total) by mouth once daily.    Bilateral hand pain  -     DULoxetine (CYMBALTA) 60 MG capsule; Take 1 capsule (60 mg total) by mouth once daily.    Essential hypertension  -     amLODIPine (NORVASC) 10 MG tablet; Take 1 tablet (10 mg total) by mouth once daily.  -     losartan (COZAAR) 100 MG tablet; Take 1 tablet (100 mg total) by mouth once daily.  -     spironolactone (ALDACTONE) 25 MG tablet; Take 1 tablet (25 mg total) by mouth once daily.    Mixed hyperlipidemia  -     rosuvastatin (CRESTOR) 10 MG tablet; Take 1 tablet (10 mg total) by mouth once daily.    Type 2 diabetes mellitus with diabetic neuropathy, without long-term current use of insulin  -     metFORMIN (GLUCOPHAGE) 1000 MG tablet; Take 1 tablet (1,000 mg total) by mouth 2 (two) times daily with meals.  -     glipiZIDE (GLUCOTROL) 10 MG TR24; Take 1 tablet (10 mg total) by mouth daily with breakfast.  -     dulaglutide (TRULICITY) 0.75 mg/0.5 mL pen injector; Inject 0.75 mg into the skin every 7 days.    Bilateral leg edema  -     Ambulatory referral/consult to Cardiology; Future    Abnormal EKG  -     Ambulatory referral/consult to Cardiology; Future

## 2022-11-15 ENCOUNTER — HOSPITAL ENCOUNTER (OUTPATIENT)
Dept: CARDIOLOGY | Facility: HOSPITAL | Age: 67
Discharge: HOME | End: 2022-11-15
Attending: NURSE PRACTITIONER
Payer: MEDICARE

## 2022-11-15 DIAGNOSIS — Z87.891 HISTORY OF TOBACCO USE: ICD-10-CM

## 2022-11-15 LAB
ABDOMINAL IMA AP: 1.32 CM
ABDOMINAL IMA ED VEL: 0 CM/S
ABDOMINAL IMA PS VEL: 115 CM/S
ABDOMINAL IMA TRANS: 1.16 CM
ABDOMINAL INFRARENAL AORTA AP: 1.61 CM
ABDOMINAL INFRARENAL AORTA ED VEL: 0 CM/S
ABDOMINAL INFRARENAL AORTA PS VEL: 70 CM/S
ABDOMINAL INFRARENAL AORTA TRANS: 1.61 CM
ABDOMINAL JUXTARENAL AORTA AP: 1.98 CM
ABDOMINAL JUXTARENAL AORTA ED VEL: 0 CM/S
ABDOMINAL JUXTARENAL AORTA PS VEL: 89 CM/S
ABDOMINAL JUXTARENAL AORTA TRANS: 1.98 CM
ABDOMINAL LT COM ILIAC AP: 0.89 CM
ABDOMINAL LT COM ILIAC TRANS: 0.88 CM
ABDOMINAL LT COM ILIAC VEL: 94 CM/S
ABDOMINAL LT COM ILLIAC ED VEL: 0 CM/S
ABDOMINAL RT COM ILIAC AP: 0.82 CM
ABDOMINAL RT COM ILIAC TRANS: 0.19 CM
ABDOMINAL RT COM ILIAC VEL: 107 CM/S
ABDOMINAL RT COM ILLIAC ED VEL: 0 CM/S
ABDOMINAL SUPRARENAL AORTA AP: 2.66 CM
ABDOMINAL SUPRARENAL AORTA ED VEL: 23 CM/S
ABDOMINAL SUPRARENAL AORTA PS VEL: 114 CM/S
ABDOMINAL SUPRARENAL AORTA TRANS: 2.63 CM

## 2022-11-15 PROCEDURE — 76706 US ABDL AORTA SCREEN AAA: CPT

## 2022-11-15 PROCEDURE — 76706 CV US AAA SCREENING (CUPID ONLY): ICD-10-PCS | Mod: 26,,, | Performed by: INTERNAL MEDICINE

## 2022-11-15 PROCEDURE — 76706 US ABDL AORTA SCREEN AAA: CPT | Mod: 26,,, | Performed by: INTERNAL MEDICINE

## 2022-12-15 ENCOUNTER — PES CALL (OUTPATIENT)
Dept: ADMINISTRATIVE | Facility: CLINIC | Age: 67
End: 2022-12-15
Payer: MEDICARE

## 2022-12-27 DIAGNOSIS — Z12.11 COLON CANCER SCREENING: Primary | ICD-10-CM

## 2023-03-09 ENCOUNTER — PATIENT OUTREACH (OUTPATIENT)
Dept: ADMINISTRATIVE | Facility: HOSPITAL | Age: 68
End: 2023-03-09
Payer: MEDICARE

## 2023-03-20 ENCOUNTER — TELEPHONE (OUTPATIENT)
Dept: INTERNAL MEDICINE | Facility: CLINIC | Age: 68
End: 2023-03-20
Payer: MEDICARE

## 2023-03-20 NOTE — TELEPHONE ENCOUNTER
LVM notifying pt of appt cancellation(Ebiai), and that they may contact the clinic to reschedule. The pt may see any available provider taking new pts.     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 Please assist w/ scheduling

## 2023-03-23 ENCOUNTER — OFFICE VISIT (OUTPATIENT)
Dept: UROLOGY | Facility: CLINIC | Age: 68
End: 2023-03-23
Payer: MEDICARE

## 2023-03-23 ENCOUNTER — LAB VISIT (OUTPATIENT)
Dept: LAB | Facility: HOSPITAL | Age: 68
End: 2023-03-23
Payer: MEDICARE

## 2023-03-23 VITALS
SYSTOLIC BLOOD PRESSURE: 171 MMHG | BODY MASS INDEX: 35.98 KG/M2 | HEIGHT: 67 IN | DIASTOLIC BLOOD PRESSURE: 77 MMHG | HEART RATE: 80 BPM | WEIGHT: 229.25 LBS

## 2023-03-23 DIAGNOSIS — N40.1 BPH ASSOCIATED WITH NOCTURIA: ICD-10-CM

## 2023-03-23 DIAGNOSIS — R35.1 BPH ASSOCIATED WITH NOCTURIA: ICD-10-CM

## 2023-03-23 DIAGNOSIS — R30.0 DYSURIA: Primary | ICD-10-CM

## 2023-03-23 DIAGNOSIS — E11.40 TYPE 2 DIABETES MELLITUS WITH DIABETIC NEUROPATHY, WITHOUT LONG-TERM CURRENT USE OF INSULIN: ICD-10-CM

## 2023-03-23 DIAGNOSIS — N47.1 PHIMOSIS OF PENIS: ICD-10-CM

## 2023-03-23 DIAGNOSIS — Z12.11 COLON CANCER SCREENING: ICD-10-CM

## 2023-03-23 LAB
BILIRUB SERPL-MCNC: NORMAL MG/DL
BLOOD URINE, POC: NORMAL
CLARITY, POC UA: CLEAR
COLOR, POC UA: NORMAL
COMPLEXED PSA SERPL-MCNC: 0.54 NG/ML (ref 0–4)
ESTIMATED AVG GLUCOSE: 329 MG/DL (ref 68–131)
GLUCOSE UR QL STRIP: 1000
HBA1C MFR BLD: 13.1 % (ref 4–5.6)
KETONES UR QL STRIP: NORMAL
LEUKOCYTE ESTERASE URINE, POC: NORMAL
NITRITE, POC UA: NORMAL
PH, POC UA: 5
POC RESIDUAL URINE VOLUME: 0 ML (ref 0–100)
PROTEIN, POC: 30
SPECIFIC GRAVITY, POC UA: 1.01
UROBILINOGEN, POC UA: NORMAL

## 2023-03-23 PROCEDURE — 83036 HEMOGLOBIN GLYCOSYLATED A1C: CPT | Performed by: NURSE PRACTITIONER

## 2023-03-23 PROCEDURE — 51798 US URINE CAPACITY MEASURE: CPT | Mod: S$GLB,,, | Performed by: NURSE PRACTITIONER

## 2023-03-23 PROCEDURE — 3008F BODY MASS INDEX DOCD: CPT | Mod: CPTII,S$GLB,, | Performed by: NURSE PRACTITIONER

## 2023-03-23 PROCEDURE — 3078F PR MOST RECENT DIASTOLIC BLOOD PRESSURE < 80 MM HG: ICD-10-PCS | Mod: CPTII,S$GLB,, | Performed by: NURSE PRACTITIONER

## 2023-03-23 PROCEDURE — 99214 PR OFFICE/OUTPT VISIT, EST, LEVL IV, 30-39 MIN: ICD-10-PCS | Mod: S$GLB,,, | Performed by: NURSE PRACTITIONER

## 2023-03-23 PROCEDURE — 3008F PR BODY MASS INDEX (BMI) DOCUMENTED: ICD-10-PCS | Mod: CPTII,S$GLB,, | Performed by: NURSE PRACTITIONER

## 2023-03-23 PROCEDURE — 99999 PR PBB SHADOW E&M-EST. PATIENT-LVL IV: CPT | Mod: PBBFAC,,, | Performed by: NURSE PRACTITIONER

## 2023-03-23 PROCEDURE — 81002 URINALYSIS NONAUTO W/O SCOPE: CPT | Mod: S$GLB,,, | Performed by: NURSE PRACTITIONER

## 2023-03-23 PROCEDURE — 36415 COLL VENOUS BLD VENIPUNCTURE: CPT | Performed by: NURSE PRACTITIONER

## 2023-03-23 PROCEDURE — 3288F FALL RISK ASSESSMENT DOCD: CPT | Mod: CPTII,S$GLB,, | Performed by: NURSE PRACTITIONER

## 2023-03-23 PROCEDURE — 3077F PR MOST RECENT SYSTOLIC BLOOD PRESSURE >= 140 MM HG: ICD-10-PCS | Mod: CPTII,S$GLB,, | Performed by: NURSE PRACTITIONER

## 2023-03-23 PROCEDURE — 99499 RISK ADDL DX/OHS AUDIT: ICD-10-PCS | Mod: S$GLB,,, | Performed by: NURSE PRACTITIONER

## 2023-03-23 PROCEDURE — 1160F PR REVIEW ALL MEDS BY PRESCRIBER/CLIN PHARMACIST DOCUMENTED: ICD-10-PCS | Mod: CPTII,S$GLB,, | Performed by: NURSE PRACTITIONER

## 2023-03-23 PROCEDURE — 1159F PR MEDICATION LIST DOCUMENTED IN MEDICAL RECORD: ICD-10-PCS | Mod: CPTII,S$GLB,, | Performed by: NURSE PRACTITIONER

## 2023-03-23 PROCEDURE — 99999 PR PBB SHADOW E&M-EST. PATIENT-LVL IV: ICD-10-PCS | Mod: PBBFAC,,, | Performed by: NURSE PRACTITIONER

## 2023-03-23 PROCEDURE — 1157F ADVNC CARE PLAN IN RCRD: CPT | Mod: CPTII,S$GLB,, | Performed by: NURSE PRACTITIONER

## 2023-03-23 PROCEDURE — 1101F PR PT FALLS ASSESS DOC 0-1 FALLS W/OUT INJ PAST YR: ICD-10-PCS | Mod: CPTII,S$GLB,, | Performed by: NURSE PRACTITIONER

## 2023-03-23 PROCEDURE — 81002 POCT URINE DIPSTICK WITHOUT MICROSCOPE: ICD-10-PCS | Mod: S$GLB,,, | Performed by: NURSE PRACTITIONER

## 2023-03-23 PROCEDURE — 1101F PT FALLS ASSESS-DOCD LE1/YR: CPT | Mod: CPTII,S$GLB,, | Performed by: NURSE PRACTITIONER

## 2023-03-23 PROCEDURE — 4010F ACE/ARB THERAPY RXD/TAKEN: CPT | Mod: CPTII,S$GLB,, | Performed by: NURSE PRACTITIONER

## 2023-03-23 PROCEDURE — 3046F HEMOGLOBIN A1C LEVEL >9.0%: CPT | Mod: CPTII,S$GLB,, | Performed by: NURSE PRACTITIONER

## 2023-03-23 PROCEDURE — 84153 ASSAY OF PSA TOTAL: CPT | Performed by: NURSE PRACTITIONER

## 2023-03-23 PROCEDURE — 3078F DIAST BP <80 MM HG: CPT | Mod: CPTII,S$GLB,, | Performed by: NURSE PRACTITIONER

## 2023-03-23 PROCEDURE — 99499 UNLISTED E&M SERVICE: CPT | Mod: S$GLB,,, | Performed by: NURSE PRACTITIONER

## 2023-03-23 PROCEDURE — 3288F PR FALLS RISK ASSESSMENT DOCUMENTED: ICD-10-PCS | Mod: CPTII,S$GLB,, | Performed by: NURSE PRACTITIONER

## 2023-03-23 PROCEDURE — 3077F SYST BP >= 140 MM HG: CPT | Mod: CPTII,S$GLB,, | Performed by: NURSE PRACTITIONER

## 2023-03-23 PROCEDURE — 4010F PR ACE/ARB THEARPY RXD/TAKEN: ICD-10-PCS | Mod: CPTII,S$GLB,, | Performed by: NURSE PRACTITIONER

## 2023-03-23 PROCEDURE — 3046F PR MOST RECENT HEMOGLOBIN A1C LEVEL > 9.0%: ICD-10-PCS | Mod: CPTII,S$GLB,, | Performed by: NURSE PRACTITIONER

## 2023-03-23 PROCEDURE — 1157F PR ADVANCE CARE PLAN OR EQUIV PRESENT IN MEDICAL RECORD: ICD-10-PCS | Mod: CPTII,S$GLB,, | Performed by: NURSE PRACTITIONER

## 2023-03-23 PROCEDURE — 1125F AMNT PAIN NOTED PAIN PRSNT: CPT | Mod: CPTII,S$GLB,, | Performed by: NURSE PRACTITIONER

## 2023-03-23 PROCEDURE — 1159F MED LIST DOCD IN RCRD: CPT | Mod: CPTII,S$GLB,, | Performed by: NURSE PRACTITIONER

## 2023-03-23 PROCEDURE — 1125F PR PAIN SEVERITY QUANTIFIED, PAIN PRESENT: ICD-10-PCS | Mod: CPTII,S$GLB,, | Performed by: NURSE PRACTITIONER

## 2023-03-23 PROCEDURE — 1160F RVW MEDS BY RX/DR IN RCRD: CPT | Mod: CPTII,S$GLB,, | Performed by: NURSE PRACTITIONER

## 2023-03-23 PROCEDURE — 99214 OFFICE O/P EST MOD 30 MIN: CPT | Mod: S$GLB,,, | Performed by: NURSE PRACTITIONER

## 2023-03-23 PROCEDURE — 51798 POCT BLADDER SCAN: ICD-10-PCS | Mod: S$GLB,,, | Performed by: NURSE PRACTITIONER

## 2023-03-23 RX ORDER — CLOTRIMAZOLE AND BETAMETHASONE DIPROPIONATE 10; .64 MG/G; MG/G
CREAM TOPICAL 2 TIMES DAILY
Qty: 45 G | Refills: 1 | Status: SHIPPED | OUTPATIENT
Start: 2023-03-23 | End: 2023-07-26

## 2023-03-23 NOTE — PROGRESS NOTES
CHIEF COMPLAINT:    Mr. Lam is a 67 y.o. male presenting for dysuria.      PRESENTING ILLNESS:    Kilo Lam is a 67 y.o. male with a PMH of MDD, vit D deficiency, diabetes type 2, NAFLD, bph who presents for dysuria.    Previous patient to urology department, but new patient to me.  Presents today for dysuria and phimosis.  Last seen in 2020 by Dr. Rdz for balanitis and phimosis.  Now unable to retract foreskin at all.        REVIEW OF SYSTEMS:    Review of Systems   Constitutional:  Negative for chills and fever.   Respiratory:  Negative for shortness of breath.    Cardiovascular:  Negative for chest pain.   Gastrointestinal:  Negative for constipation and diarrhea.   Genitourinary:  Negative for dysuria, flank pain, frequency, hematuria and urgency.   Neurological:  Negative for dizziness and weakness.     PATIENT HISTORY:    Past Medical History:   Diagnosis Date    Anxiety and depression 11/14/2018    Carpal tunnel syndrome, bilateral 4/9/2013    S/p Lt Carpal tunnel release 11/2011     DJD (degenerative joint disease) of lumbar spine 2/9/2015    Essential hypertension 11/6/2012    Fatty liver 11/26/2012    2012: US of ABD showed: Severe hepatic steatosis and  Hepatomegaly.    Mixed hyperlipidemia 12/5/2014    Morbid obesity 4/9/2013    BMI= 41.2 on 04/09/2013     Type 2 diabetes mellitus with diabetic neuropathy, without long-term current use of insulin 12/5/2014       Family History   Problem Relation Age of Onset    Diabetes Mother     Diabetes Father     Heart failure Father     Cancer Daughter 14        Leukemia, passed    Cancer Other         niece, Lung cancer    Liver disease Neg Hx     Colon cancer Neg Hx        Allergies:  No known drug allergies    Medications:    Current Outpatient Medications:     amLODIPine (NORVASC) 10 MG tablet, Take 1 tablet by mouth once daily, Disp: 90 tablet, Rfl: 0    aspirin (ECOTRIN) 81 MG EC tablet, Take 81 mg by mouth once daily., Disp: , Rfl:      betamethasone valerate 0.1% (VALISONE) 0.1 % Oint, Apply topically 2 (two) times daily., Disp: 30 g, Rfl: 3    blood sugar diagnostic Strp, 1 strip by Misc.(Non-Drug; Combo Route) route before meals and at bedtime as needed., Disp: 200 strip, Rfl: 4    cholecalciferol, vitamin D3, (VITAMIN D3) 50 mcg (2,000 unit) Cap, Take 1 capsule (2,000 Units total) by mouth once daily., Disp: 90 capsule, Rfl: 0    diclofenac sodium (VOLTAREN) 1 % Gel, Apply 2 g topically once daily. For joint and muscle pain, Disp: 100 g, Rfl: 1    dulaglutide (TRULICITY) 0.75 mg/0.5 mL pen injector, Inject 0.75 mg into the skin every 7 days., Disp: 4 pen, Rfl: 2    DULoxetine (CYMBALTA) 30 MG capsule, Take 30 mg by mouth once daily., Disp: , Rfl:     DULoxetine (CYMBALTA) 60 MG capsule, Take 1 capsule (60 mg total) by mouth once daily., Disp: 90 capsule, Rfl: 0    fluticasone (FLONASE) 50 mcg/actuation nasal spray, 1 spray by Nasal route., Disp: , Rfl:     glipiZIDE (GLUCOTROL) 10 MG TR24, Take 1 tablet (10 mg total) by mouth daily with breakfast., Disp: 90 tablet, Rfl: 0    ketoconazole (NIZORAL) 2 % shampoo, Apply topically twice a week., Disp: 120 mL, Rfl: 1    lancets (LANCETS,ULTRA THIN) Misc, 1 application by Misc.(Non-Drug; Combo Route) route before meals and at bedtime as needed., Disp: 200 each, Rfl: 4    loratadine (CLARITIN) 10 mg tablet, Take 10 mg by mouth daily as needed., Disp: , Rfl:     losartan (COZAAR) 100 MG tablet, Take 1 tablet (100 mg total) by mouth once daily., Disp: 90 tablet, Rfl: 0    metFORMIN (GLUCOPHAGE) 1000 MG tablet, Take 1 tablet (1,000 mg total) by mouth 2 (two) times daily with meals., Disp: 180 tablet, Rfl: 0    nirmatrelvir-ritonavir 300 mg (150 mg x 2)-100 mg copackaged tablets (EUA), Take 3 tablets by mouth 2 (two) times daily. Each dose contains 2 nirmatrelvir (pink tablets) and 1 ritonavir (white tablet). Take all 3 tablets together, Disp: 30 tablet, Rfl: 0    promethazine-dextromethorphan  (PROMETHAZINE-DM) 6.25-15 mg/5 mL Syrp, Take 5 mLs by mouth every 4 to 6 hours as needed (cough)., Disp: 118 mL, Rfl: 0    rosuvastatin (CRESTOR) 10 MG tablet, Take 1 tablet (10 mg total) by mouth once daily., Disp: 90 tablet, Rfl: 0    spironolactone (ALDACTONE) 25 MG tablet, Take 1 tablet (25 mg total) by mouth once daily., Disp: 90 tablet, Rfl: 0    blood-glucose meter kit, To check BG 3 times daily, to use with insurance preferred meter, Disp: 1 each, Rfl: 0    cetirizine (ZYRTEC) 10 MG tablet, Take 1 tablet (10 mg total) by mouth every evening. (Patient not taking: Reported on 8/26/2022), Disp: 30 tablet, Rfl: 0    clotrimazole-betamethasone 1-0.05% (LOTRISONE) cream, Apply topically 2 (two) times daily., Disp: 45 g, Rfl: 1    PHYSICAL EXAMINATION:    Physical Exam  Vitals and nursing note reviewed.   Constitutional:       Appearance: Normal appearance. He is well-developed. He is obese.   HENT:      Head: Normocephalic and atraumatic.   Eyes:      Pupils: Pupils are equal, round, and reactive to light.   Pulmonary:      Effort: Pulmonary effort is normal.   Genitourinary:     Penis: Uncircumcised. Phimosis present.       Testes: Normal.      Prostate: Enlarged. Not tender.      Rectum: Normal.      Comments: Smooth, nontender  Musculoskeletal:         General: Normal range of motion.      Cervical back: Normal range of motion.   Skin:     General: Skin is warm and dry.   Neurological:      Mental Status: He is alert and oriented to person, place, and time.   Psychiatric:         Behavior: Behavior normal.         LABS:    U/a performed in office today: yellow, ph 5, 1.015, +30 protein, +1000 glucose, otherwise unremarkable  Bladder scan performed by Nurse Kellen.  PVR 0 ml    Lab Results   Component Value Date    PSA 0.46 05/08/2019    PSA 0.45 05/29/2015    PSA 0.59 09/15/2011    PSA 0.39 07/09/2010    PSA 0.38 04/13/2009    PSA 0.4 11/28/2006    PSA 0.3 05/12/2005       IMPRESSION:  Encounter Diagnoses   Name  Primary?    Dysuria Yes    BPH associated with nocturia     Colon cancer screening     Phimosis of penis     Type 2 diabetes mellitus with diabetic neuropathy, without long-term current use of insulin          PLAN:  Problem List Items Addressed This Visit       Type 2 diabetes mellitus with diabetic neuropathy, without long-term current use of insulin    Relevant Orders    Hemoglobin A1C    BPH associated with nocturia    Relevant Orders    PROSTATE SPECIFIC ANTIGEN, DIAGNOSTIC     Other Visit Diagnoses       Dysuria    -  Primary    Relevant Orders    POCT URINE DIPSTICK WITHOUT MICROSCOPE (Completed)    POCT Bladder Scan (Completed)    Colon cancer screening        Relevant Orders    Case Request Endoscopy: COLONOSCOPY (Completed)    Phimosis of penis        Relevant Medications    clotrimazole-betamethasone 1-0.05% (LOTRISONE) cream            1. Phimosis   - instructed to clean and dry area, apply clot-beta cream twice daily while actively retracting foreskin   - if unable to retract foreskin, circumcision recommended  2. Type 2 diabetes   - advised better diabetic control to prevent infection   - recommend weight loss and exercise  3. Bph without LUTs  - ТАТЬЯНА completed  - obtain psa today  4.  Rtc in 2 weeks      Sanaz Rodriguez NP    I spent over 30 minutes with the patient. Over 50% of the visit was spent in counseling.

## 2023-03-23 NOTE — PROGRESS NOTES
Patient, Kilo Lam (MRN #[id number]), presented with a recorded BMI of 35.91 kg/m^2 and a documented comorbidity(s):  - Diabetes Mellitus Type 2  to which the severe obesity is a contributing factor. This is consistent with the definition of severe obesity (BMI 35.0-39.9) with comorbidity (ICD-10 E66.01, Z68.35). The patient's severe obesity was monitored, evaluated, addressed and/or treated. This addendum to the medical record is made on 03/23/2023.

## 2023-04-06 ENCOUNTER — LAB VISIT (OUTPATIENT)
Dept: LAB | Facility: HOSPITAL | Age: 68
End: 2023-04-06
Payer: MEDICARE

## 2023-04-06 ENCOUNTER — OFFICE VISIT (OUTPATIENT)
Dept: INTERNAL MEDICINE | Facility: CLINIC | Age: 68
End: 2023-04-06
Payer: MEDICARE

## 2023-04-06 VITALS
HEART RATE: 84 BPM | SYSTOLIC BLOOD PRESSURE: 138 MMHG | DIASTOLIC BLOOD PRESSURE: 68 MMHG | BODY MASS INDEX: 36.57 KG/M2 | HEIGHT: 67 IN | WEIGHT: 233 LBS

## 2023-04-06 DIAGNOSIS — M25.50 ARTHRALGIA OF MULTIPLE JOINTS: ICD-10-CM

## 2023-04-06 DIAGNOSIS — E11.69 HYPERLIPIDEMIA ASSOCIATED WITH TYPE 2 DIABETES MELLITUS: ICD-10-CM

## 2023-04-06 DIAGNOSIS — E11.52 TYPE 2 DIABETES MELLITUS WITH DIABETIC PERIPHERAL ANGIOPATHY WITH GANGRENE, UNSPECIFIED WHETHER LONG TERM INSULIN USE: ICD-10-CM

## 2023-04-06 DIAGNOSIS — E11.40 TYPE 2 DIABETES MELLITUS WITH DIABETIC NEUROPATHY, WITHOUT LONG-TERM CURRENT USE OF INSULIN: ICD-10-CM

## 2023-04-06 DIAGNOSIS — F33.0 MAJOR DEPRESSIVE DISORDER, RECURRENT, MILD: ICD-10-CM

## 2023-04-06 DIAGNOSIS — E11.59 HYPERTENSION ASSOCIATED WITH TYPE 2 DIABETES MELLITUS: ICD-10-CM

## 2023-04-06 DIAGNOSIS — E66.01 CLASS 2 SEVERE OBESITY WITH SERIOUS COMORBIDITY AND BODY MASS INDEX (BMI) OF 35.0 TO 35.9 IN ADULT, UNSPECIFIED OBESITY TYPE: ICD-10-CM

## 2023-04-06 DIAGNOSIS — E78.5 HYPERLIPIDEMIA ASSOCIATED WITH TYPE 2 DIABETES MELLITUS: ICD-10-CM

## 2023-04-06 DIAGNOSIS — M25.50 ARTHRALGIA OF MULTIPLE JOINTS: Primary | ICD-10-CM

## 2023-04-06 DIAGNOSIS — I15.2 HYPERTENSION ASSOCIATED WITH TYPE 2 DIABETES MELLITUS: ICD-10-CM

## 2023-04-06 LAB
CCP AB SER IA-ACNC: <0.5 U/ML
CRP SERPL-MCNC: 20.8 MG/L (ref 0–8.2)
ERYTHROCYTE [SEDIMENTATION RATE] IN BLOOD BY PHOTOMETRIC METHOD: 90 MM/HR (ref 0–23)
RHEUMATOID FACT SERPL-ACNC: 32 IU/ML (ref 0–15)

## 2023-04-06 PROCEDURE — 99214 OFFICE O/P EST MOD 30 MIN: CPT | Mod: GC,S$GLB,,

## 2023-04-06 PROCEDURE — 1159F PR MEDICATION LIST DOCUMENTED IN MEDICAL RECORD: ICD-10-PCS | Mod: CPTII,GC,S$GLB,

## 2023-04-06 PROCEDURE — 99499 UNLISTED E&M SERVICE: CPT | Mod: S$GLB,,,

## 2023-04-06 PROCEDURE — 1159F MED LIST DOCD IN RCRD: CPT | Mod: CPTII,GC,S$GLB,

## 2023-04-06 PROCEDURE — 3075F PR MOST RECENT SYSTOLIC BLOOD PRESS GE 130-139MM HG: ICD-10-PCS | Mod: CPTII,GC,S$GLB,

## 2023-04-06 PROCEDURE — 4010F PR ACE/ARB THEARPY RXD/TAKEN: ICD-10-PCS | Mod: CPTII,GC,S$GLB,

## 2023-04-06 PROCEDURE — 3008F PR BODY MASS INDEX (BMI) DOCUMENTED: ICD-10-PCS | Mod: CPTII,GC,S$GLB,

## 2023-04-06 PROCEDURE — 3078F DIAST BP <80 MM HG: CPT | Mod: CPTII,GC,S$GLB,

## 2023-04-06 PROCEDURE — 1157F ADVNC CARE PLAN IN RCRD: CPT | Mod: CPTII,GC,S$GLB,

## 2023-04-06 PROCEDURE — 99214 PR OFFICE/OUTPT VISIT, EST, LEVL IV, 30-39 MIN: ICD-10-PCS | Mod: GC,S$GLB,,

## 2023-04-06 PROCEDURE — 1160F RVW MEDS BY RX/DR IN RCRD: CPT | Mod: CPTII,GC,S$GLB,

## 2023-04-06 PROCEDURE — 1157F PR ADVANCE CARE PLAN OR EQUIV PRESENT IN MEDICAL RECORD: ICD-10-PCS | Mod: CPTII,GC,S$GLB,

## 2023-04-06 PROCEDURE — 3008F BODY MASS INDEX DOCD: CPT | Mod: CPTII,GC,S$GLB,

## 2023-04-06 PROCEDURE — 85652 RBC SED RATE AUTOMATED: CPT

## 2023-04-06 PROCEDURE — 4010F ACE/ARB THERAPY RXD/TAKEN: CPT | Mod: CPTII,GC,S$GLB,

## 2023-04-06 PROCEDURE — 3046F HEMOGLOBIN A1C LEVEL >9.0%: CPT | Mod: CPTII,GC,S$GLB,

## 2023-04-06 PROCEDURE — 99999 PR PBB SHADOW E&M-EST. PATIENT-LVL III: ICD-10-PCS | Mod: PBBFAC,GC,,

## 2023-04-06 PROCEDURE — 36415 COLL VENOUS BLD VENIPUNCTURE: CPT

## 2023-04-06 PROCEDURE — 3078F PR MOST RECENT DIASTOLIC BLOOD PRESSURE < 80 MM HG: ICD-10-PCS | Mod: CPTII,GC,S$GLB,

## 2023-04-06 PROCEDURE — 1160F PR REVIEW ALL MEDS BY PRESCRIBER/CLIN PHARMACIST DOCUMENTED: ICD-10-PCS | Mod: CPTII,GC,S$GLB,

## 2023-04-06 PROCEDURE — 3046F PR MOST RECENT HEMOGLOBIN A1C LEVEL > 9.0%: ICD-10-PCS | Mod: CPTII,GC,S$GLB,

## 2023-04-06 PROCEDURE — 86431 RHEUMATOID FACTOR QUANT: CPT

## 2023-04-06 PROCEDURE — 3075F SYST BP GE 130 - 139MM HG: CPT | Mod: CPTII,GC,S$GLB,

## 2023-04-06 PROCEDURE — 99999 PR PBB SHADOW E&M-EST. PATIENT-LVL III: CPT | Mod: PBBFAC,GC,,

## 2023-04-06 PROCEDURE — 86200 CCP ANTIBODY: CPT

## 2023-04-06 PROCEDURE — 86140 C-REACTIVE PROTEIN: CPT

## 2023-04-06 NOTE — PROGRESS NOTES
Subjective     Chief Complaint: Pain of multiple joints    History of Present Illness:  Mr. Kilo Lam is a 67 y.o. male here with significant past medical hx of uncontrolled T2DM (A1C >13), HTN, HLD, here for worsening pain in joints. He describes it as pain and stiffness that began years ago but he has noticed it worsened  to significantly worsening in the last 2 years. Specifically, in his fingers, knees, and ankles. It is worse first thing in the am, with symptoms so severe patient states he is unable to hold a coffee mug regularly and must adjust his routine to compensate. It is minimally improved with OTC aleve, and glucosamine chondroitin. He would like to establish here in our clinic.     Review of Systems   Constitutional:  Negative for chills, diaphoresis, fever and malaise/fatigue.   HENT:  Positive for congestion (Sinus congestion 1 month prior that is improving).    Respiratory:  Negative for cough, shortness of breath and wheezing.    Cardiovascular:  Negative for chest pain and palpitations.   Gastrointestinal:  Negative for abdominal pain, heartburn, nausea and vomiting.   Musculoskeletal:  Positive for joint pain and myalgias. Negative for falls.   Skin:  Positive for itching and rash.   Psychiatric/Behavioral:  Negative for depression. The patient is not nervous/anxious.      PAST HISTORY:     Past Medical History:   Diagnosis Date    Anxiety and depression 11/14/2018    Carpal tunnel syndrome, bilateral 4/9/2013    S/p Lt Carpal tunnel release 11/2011     DJD (degenerative joint disease) of lumbar spine 2/9/2015    Essential hypertension 11/6/2012    Fatty liver 11/26/2012    2012: US of ABD showed: Severe hepatic steatosis and  Hepatomegaly.    Mixed hyperlipidemia 12/5/2014    Morbid obesity 4/9/2013    BMI= 41.2 on 04/09/2013     Type 2 diabetes mellitus with diabetic neuropathy, without long-term current use of insulin 12/5/2014       Past Surgical History:   Procedure Laterality Date     CARPAL TUNNEL RELEASE Left 2011    no relief of hand symptoms    COLONOSCOPY  2004    normal, repeat in 7 yrs    COLONOSCOPY N/A 3/27/2019    Procedure: COLONOSCOPY;  Surgeon: Smooth German MD;  Location: [facility] ([address] [place] [address]);  Service: Endoscopy;  Laterality: N/A;    INCISIONAL HERNIA REPAIR Left 2012    inguinal    SHOULDER SURGERY Left        Family History   Problem Relation Age of Onset    Diabetes Mother     Diabetes Father     Heart failure Father     Cancer Daughter 14        Leukemia, passed    Cancer Other         niece, Lung cancer    Liver disease Neg Hx     Colon cancer Neg Hx        Social History     Tobacco Use    Smoking status: Former     Types: Cigarettes     Quit date: 2009     Years since quittin.1    Smokeless tobacco: Never   Substance Use Topics    Alcohol use: No    Drug use: No       MEDICATIONS & ALLERGIES:     Current Outpatient Medications on File Prior to Visit   Medication Sig    amLODIPine (NORVASC) 10 MG tablet Take 1 tablet by mouth once daily    aspirin (ECOTRIN) 81 MG EC tablet Take 81 mg by mouth once daily.    betamethasone valerate 0.1% (VALISONE) 0.1 % Oint Apply topically 2 (two) times daily.    blood sugar diagnostic Strp 1 strip by Misc.(Non-Drug; Combo Route) route before meals and at bedtime as needed.    blood-glucose meter kit To check BG 3 times daily, to use with insurance preferred meter    cetirizine (ZYRTEC) 10 MG tablet Take 1 tablet (10 mg total) by mouth every evening. (Patient not taking: Reported on 2022)    cholecalciferol, vitamin D3, (VITAMIN D3) 50 mcg (2,000 unit) Cap Take 1 capsule (2,000 Units total) by mouth once daily.    clotrimazole-betamethasone 1-0.05% (LOTRISONE) cream Apply topically 2 (two) times daily.    diclofenac sodium (VOLTAREN) 1 % Gel Apply 2 g topically once daily. For joint and muscle pain    dulaglutide (TRULICITY) 0.75 mg/0.5 mL pen injector Inject 0.75 mg into the skin every 7 days.    DULoxetine  "(CYMBALTA) 30 MG capsule Take 30 mg by mouth once daily.    DULoxetine (CYMBALTA) 60 MG capsule Take 1 capsule (60 mg total) by mouth once daily.    fluticasone (FLONASE) 50 mcg/actuation nasal spray 1 spray by Nasal route.    glipiZIDE (GLUCOTROL) 10 MG TR24 Take 1 tablet (10 mg total) by mouth daily with breakfast.    ketoconazole (NIZORAL) 2 % shampoo Apply topically twice a week.    lancets (LANCETS,ULTRA THIN) Misc 1 application by Misc.(Non-Drug; Combo Route) route before meals and at bedtime as needed.    loratadine (CLARITIN) 10 mg tablet Take 10 mg by mouth daily as needed.    losartan (COZAAR) 100 MG tablet Take 1 tablet by mouth once daily    metFORMIN (GLUCOPHAGE) 1000 MG tablet Take 1 tablet (1,000 mg total) by mouth 2 (two) times daily with meals.    nirmatrelvir-ritonavir 300 mg (150 mg x 2)-100 mg copackaged tablets (EUA) Take 3 tablets by mouth 2 (two) times daily. Each dose contains 2 nirmatrelvir (pink tablets) and 1 ritonavir (white tablet). Take all 3 tablets together    promethazine-dextromethorphan (PROMETHAZINE-DM) 6.25-15 mg/5 mL Syrp Take 5 mLs by mouth every 4 to 6 hours as needed (cough).    rosuvastatin (CRESTOR) 10 MG tablet Take 1 tablet by mouth once daily    spironolactone (ALDACTONE) 25 MG tablet Take 1 tablet (25 mg total) by mouth once daily.     No current facility-administered medications on file prior to visit.       Review of patient's allergies indicates:   Allergen Reactions    No known drug allergies        OBJECTIVE:     Vital Signs:  Vitals:    04/06/23 1403   BP: 138/68   Pulse: 84   Weight: 105.7 kg (233 lb)   Height: 5' 7" (1.702 m)       Body mass index is 36.49 kg/m².     Physical Exam  Constitutional:       General: He is not in acute distress.     Appearance: Normal appearance. He is obese. He is not ill-appearing, toxic-appearing or diaphoretic.   HENT:      Head: Normocephalic and atraumatic.   Cardiovascular:      Rate and Rhythm: Normal rate and regular rhythm. "      Pulses: Normal pulses.      Heart sounds: Murmur heard.   Pulmonary:      Effort: Pulmonary effort is normal. No respiratory distress.      Breath sounds: Normal breath sounds. No wheezing.   Abdominal:      General: There is no distension.      Tenderness: There is no abdominal tenderness.   Musculoskeletal:         General: Tenderness present. No signs of injury.   Skin:     Findings: Erythema and rash present.   Neurological:      General: No focal deficit present.      Mental Status: He is alert and oriented to person, place, and time. Mental status is at baseline.   Psychiatric:         Mood and Affect: Mood normal.         Behavior: Behavior normal.     Health Maintenance Due   Topic Date Due    TETANUS VACCINE  11/11/2015    Abdominal Aortic Aneurysm Screening  12/20/2018    Foot Exam  11/19/2019    Eye Exam  09/09/2022         ASSESSMENT & PLAN:   Mr. Kilo Lam is a 67 y.o. male with worsening joint pain in multiple areas including fingers, knees, and ankles. Patient admits to neglecting medical health due to COVID but motivated to improve. Patient denies family hx of autoimmune or joint related pathologies to his knowledge. Last A1C >13 but patient denies taking medications as prescribed specifically his Trulicity.   - Will order blood work for ESR, CRP, CCP, and RF , if normal will discuss at follow-up   - Recommend paraffin wax baths to relieve stiffness   - Recommend OTC Voltaren gel to be applied to joints   - Encouraged patient to take Trulicity as well as his current medications as prescribed   - Recommend taking BP at home, patient states owning a cuff  - RTC in 1 month for follow-up and further health maintenance     Arthralgia of multiple joints  -     Sedimentation rate; Future; Expected date: 04/06/2023  -     C-REACTIVE PROTEIN; Future; Expected date: 04/06/2023  -     RHEUMATOID FACTOR; Future; Expected date: 04/06/2023  -     CYCLIC CITRUL PEPTIDE ANTIBODY, IGG; Future; Expected  date: 04/06/2023    Type 2 diabetes mellitus with diabetic neuropathy, without long-term current use of insulin    Class 2 severe obesity with serious comorbidity and body mass index (BMI) of 35.0 to 35.9 in adult, unspecified obesity type    Hypertension associated with type 2 diabetes mellitus    Hyperlipidemia associated with type 2 diabetes mellitus        RTC in 1 month     Discussed with Dr. Taylor - staff attestation to follow      Horacio Valentin DO, MSB   Internal Medicine, PGY-I  [facility]  [address] [place] [id number] [phone]

## 2023-04-08 ENCOUNTER — TELEPHONE (OUTPATIENT)
Dept: INTERNAL MEDICINE | Facility: CLINIC | Age: 68
End: 2023-04-08
Payer: MEDICARE

## 2023-04-08 DIAGNOSIS — M25.50 ARTHRALGIA OF MULTIPLE JOINTS: Primary | ICD-10-CM

## 2023-04-08 NOTE — TELEPHONE ENCOUNTER
Called number in chart 3x which rang and went to voicemail. Was going to update regarding lab results for patient's years of joint pain in large and smaller joints with 2 years of progressively worsening morning stiffness. Elevated inflammatory markers and elevated RF. At last appointment discussed with possible referral to rheumatology based on results which he was agreeable to at time of appointment. Will send referral.

## 2023-04-24 ENCOUNTER — PES CALL (OUTPATIENT)
Dept: ADMINISTRATIVE | Facility: CLINIC | Age: 68
End: 2023-04-24
Payer: MEDICARE

## 2023-05-15 ENCOUNTER — TELEPHONE (OUTPATIENT)
Dept: ENDOSCOPY | Facility: HOSPITAL | Age: 68
End: 2023-05-15

## 2023-05-16 ENCOUNTER — OFFICE VISIT (OUTPATIENT)
Dept: INTERNAL MEDICINE | Facility: CLINIC | Age: 68
End: 2023-05-16
Payer: MEDICARE

## 2023-05-16 ENCOUNTER — TELEPHONE (OUTPATIENT)
Dept: INTERNAL MEDICINE | Facility: CLINIC | Age: 68
End: 2023-05-16

## 2023-05-16 VITALS
HEIGHT: 67 IN | DIASTOLIC BLOOD PRESSURE: 65 MMHG | BODY MASS INDEX: 36.57 KG/M2 | SYSTOLIC BLOOD PRESSURE: 163 MMHG | HEART RATE: 76 BPM | WEIGHT: 233 LBS

## 2023-05-16 DIAGNOSIS — Z00.00 HEALTH MAINTENANCE EXAMINATION: ICD-10-CM

## 2023-05-16 DIAGNOSIS — I10 ESSENTIAL HYPERTENSION: ICD-10-CM

## 2023-05-16 DIAGNOSIS — E11.59 HYPERTENSION ASSOCIATED WITH TYPE 2 DIABETES MELLITUS: ICD-10-CM

## 2023-05-16 DIAGNOSIS — I15.2 HYPERTENSION ASSOCIATED WITH TYPE 2 DIABETES MELLITUS: ICD-10-CM

## 2023-05-16 DIAGNOSIS — E11.40 TYPE 2 DIABETES MELLITUS WITH DIABETIC NEUROPATHY, WITHOUT LONG-TERM CURRENT USE OF INSULIN: Primary | ICD-10-CM

## 2023-05-16 DIAGNOSIS — E78.2 MIXED HYPERLIPIDEMIA: ICD-10-CM

## 2023-05-16 DIAGNOSIS — M25.50 ARTHRALGIA OF MULTIPLE JOINTS: ICD-10-CM

## 2023-05-16 DIAGNOSIS — R51.9 FOREHEAD PAIN: ICD-10-CM

## 2023-05-16 PROCEDURE — 3078F DIAST BP <80 MM HG: CPT | Mod: CPTII,GC,,

## 2023-05-16 PROCEDURE — 1157F PR ADVANCE CARE PLAN OR EQUIV PRESENT IN MEDICAL RECORD: ICD-10-PCS | Mod: CPTII,GC,,

## 2023-05-16 PROCEDURE — 3077F SYST BP >= 140 MM HG: CPT | Mod: CPTII,GC,,

## 2023-05-16 PROCEDURE — 3046F HEMOGLOBIN A1C LEVEL >9.0%: CPT | Mod: CPTII,GC,,

## 2023-05-16 PROCEDURE — 4010F ACE/ARB THERAPY RXD/TAKEN: CPT | Mod: CPTII,GC,,

## 2023-05-16 PROCEDURE — 3008F BODY MASS INDEX DOCD: CPT | Mod: CPTII,GC,,

## 2023-05-16 PROCEDURE — 3008F PR BODY MASS INDEX (BMI) DOCUMENTED: ICD-10-PCS | Mod: CPTII,GC,,

## 2023-05-16 PROCEDURE — 99214 OFFICE O/P EST MOD 30 MIN: CPT | Mod: GC,,,

## 2023-05-16 PROCEDURE — 99214 PR OFFICE/OUTPT VISIT, EST, LEVL IV, 30-39 MIN: ICD-10-PCS | Mod: GC,,,

## 2023-05-16 PROCEDURE — 1157F ADVNC CARE PLAN IN RCRD: CPT | Mod: CPTII,GC,,

## 2023-05-16 PROCEDURE — 3046F PR MOST RECENT HEMOGLOBIN A1C LEVEL > 9.0%: ICD-10-PCS | Mod: CPTII,GC,,

## 2023-05-16 PROCEDURE — 99999 PR PBB SHADOW E&M-EST. PATIENT-LVL III: CPT | Mod: PBBFAC,GC,,

## 2023-05-16 PROCEDURE — 3077F PR MOST RECENT SYSTOLIC BLOOD PRESSURE >= 140 MM HG: ICD-10-PCS | Mod: CPTII,GC,,

## 2023-05-16 PROCEDURE — 4010F PR ACE/ARB THEARPY RXD/TAKEN: ICD-10-PCS | Mod: CPTII,GC,,

## 2023-05-16 PROCEDURE — 3078F PR MOST RECENT DIASTOLIC BLOOD PRESSURE < 80 MM HG: ICD-10-PCS | Mod: CPTII,GC,,

## 2023-05-16 PROCEDURE — 99999 PR PBB SHADOW E&M-EST. PATIENT-LVL III: ICD-10-PCS | Mod: PBBFAC,GC,,

## 2023-05-16 RX ORDER — LOSARTAN POTASSIUM 100 MG/1
100 TABLET ORAL DAILY
Qty: 90 TABLET | Refills: 3 | Status: SHIPPED | OUTPATIENT
Start: 2023-05-16

## 2023-05-16 RX ORDER — FLUOCINONIDE TOPICAL SOLUTION USP, 0.05% 0.5 MG/ML
SOLUTION TOPICAL 2 TIMES DAILY
Qty: 60 ML | Refills: 0 | Status: SHIPPED | OUTPATIENT
Start: 2023-05-16 | End: 2023-07-26

## 2023-05-16 RX ORDER — MELOXICAM 7.5 MG/1
7.5 TABLET ORAL DAILY
Qty: 14 TABLET | Refills: 0 | Status: SHIPPED | OUTPATIENT
Start: 2023-05-16 | End: 2023-06-23 | Stop reason: SDUPTHER

## 2023-05-16 RX ORDER — ROSUVASTATIN CALCIUM 10 MG/1
10 TABLET, COATED ORAL DAILY
Qty: 90 TABLET | Refills: 3 | Status: SHIPPED | OUTPATIENT
Start: 2023-05-16

## 2023-05-16 RX ORDER — AMLODIPINE BESYLATE 10 MG/1
10 TABLET ORAL DAILY
Qty: 90 TABLET | Refills: 3 | Status: SHIPPED | OUTPATIENT
Start: 2023-05-16

## 2023-05-16 RX ORDER — GLIPIZIDE 10 MG/1
10 TABLET, FILM COATED, EXTENDED RELEASE ORAL
Qty: 90 TABLET | Refills: 3 | Status: SHIPPED | OUTPATIENT
Start: 2023-05-16 | End: 2024-05-15

## 2023-05-16 RX ORDER — METFORMIN HYDROCHLORIDE 1000 MG/1
1000 TABLET ORAL 2 TIMES DAILY WITH MEALS
Qty: 180 TABLET | Refills: 3 | Status: SHIPPED | OUTPATIENT
Start: 2023-05-16

## 2023-05-16 RX ORDER — SPIRONOLACTONE 25 MG/1
25 TABLET ORAL DAILY
Qty: 90 TABLET | Refills: 3 | Status: SHIPPED | OUTPATIENT
Start: 2023-05-16

## 2023-05-16 NOTE — TELEPHONE ENCOUNTER
Manuel Bravo   This pt is seen in IM resident clinic and has been having arthralgias and atypical forehead/left temporal H/As with itching off/on x 1 year. His RF is + and CRP high at 20 with ESR high at 90.  He unfortunately has uncontrolled DM so steroids are not ideal so we are in a quandry about him and his ?dx.  I worry about an atypical GCA.  We're having problems getting him into Rheumatology.  Is there any way you can assist with getting him into the fellows clinic? WE may have to refer outside [facility] as well.  Thanks for any help, Mara Smith

## 2023-05-16 NOTE — PROGRESS NOTES
Subjective     Chief Complaint: Follow-up     History of Present Illness:  Mr. Kilo Lam is a 67 y.o. male  here for 1 week follow-up. He reports ongoing joint tenderness in his hands bilaterally. Recent labs elevated ESR/CRP along with RF concerning for potential RA. He further endorses continued 1 year hx of forehead pain that travels throughout the scalp with associated tearing of the L eye secondary to pain. He states it as an itching pain, and that when not itching, there is no associated tenderness. His joints of the hands are , but states it is better than at previous appointment and that wax baths provides some relief. He endorses medication compliance with BP medications with exception to losartan which he has ran out of. He has equipment to check sugars at home however currently does not check regularly. He has picked up his script for Trulicity however he has not administered it yet due to fear of needles.     Review of Systems   Constitutional:  Positive for malaise/fatigue. Negative for chills, fever and weight loss.   Eyes:  Positive for discharge (L eye). Negative for blurred vision and double vision.   Respiratory:  Negative for cough, shortness of breath and wheezing.    Cardiovascular:  Negative for chest pain and leg swelling.   Gastrointestinal:  Negative for heartburn, nausea and vomiting.   Musculoskeletal:  Positive for joint pain and myalgias.   Skin:  Positive for itching and rash.   Neurological:  Positive for headaches. Negative for dizziness.   Psychiatric/Behavioral:  Negative for depression. The patient is not nervous/anxious.      PAST HISTORY:     Past Medical History:   Diagnosis Date    Anxiety and depression 11/14/2018    Carpal tunnel syndrome, bilateral 4/9/2013    S/p Lt Carpal tunnel release 11/2011     DJD (degenerative joint disease) of lumbar spine 2/9/2015    Essential hypertension 11/6/2012    Fatty liver 11/26/2012    2012: US of ABD showed: Severe  hepatic steatosis and  Hepatomegaly.    Mixed hyperlipidemia 2014    Morbid obesity 2013    BMI= 41.2 on 2013     Type 2 diabetes mellitus with diabetic neuropathy, without long-term current use of insulin 2014       Past Surgical History:   Procedure Laterality Date    CARPAL TUNNEL RELEASE Left 2011    no relief of hand symptoms    COLONOSCOPY  2004    normal, repeat in 7 yrs    COLONOSCOPY N/A 3/27/2019    Procedure: COLONOSCOPY;  Surgeon: Smooth German MD;  Location: [facility] ([address] [place] [address]);  Service: Endoscopy;  Laterality: N/A;    INCISIONAL HERNIA REPAIR Left 2012    inguinal    SHOULDER SURGERY Left        Family History   Problem Relation Age of Onset    Diabetes Mother     Diabetes Father     Heart failure Father     Cancer Daughter 14        Leukemia, passed    Cancer Other         niece, Lung cancer    Liver disease Neg Hx     Colon cancer Neg Hx        Social History     Tobacco Use    Smoking status: Former     Types: Cigarettes     Quit date: 2009     Years since quittin.3    Smokeless tobacco: Never   Substance Use Topics    Alcohol use: No    Drug use: No       MEDICATIONS & ALLERGIES:     Current Outpatient Medications on File Prior to Visit   Medication Sig    aspirin (ECOTRIN) 81 MG EC tablet Take 81 mg by mouth once daily.    betamethasone valerate 0.1% (VALISONE) 0.1 % Oint Apply topically 2 (two) times daily.    blood sugar diagnostic Strp 1 strip by Misc.(Non-Drug; Combo Route) route before meals and at bedtime as needed.    blood-glucose meter kit To check BG 3 times daily, to use with insurance preferred meter    cetirizine (ZYRTEC) 10 MG tablet Take 1 tablet (10 mg total) by mouth every evening. (Patient not taking: Reported on 2022)    cholecalciferol, vitamin D3, (VITAMIN D3) 50 mcg (2,000 unit) Cap Take 1 capsule (2,000 Units total) by mouth once daily.    clotrimazole-betamethasone 1-0.05% (LOTRISONE) cream Apply topically 2 (two) times  daily.    diclofenac sodium (VOLTAREN) 1 % Gel Apply 2 g topically once daily. For joint and muscle pain    dulaglutide (TRULICITY) 0.75 mg/0.5 mL pen injector Inject 0.75 mg into the skin every 7 days.    fluticasone (FLONASE) 50 mcg/actuation nasal spray 1 spray by Nasal route.    ketoconazole (NIZORAL) 2 % shampoo Apply topically twice a week.    lancets (LANCETS,ULTRA THIN) Misc 1 application by Misc.(Non-Drug; Combo Route) route before meals and at bedtime as needed.    loratadine (CLARITIN) 10 mg tablet Take 10 mg by mouth daily as needed.    promethazine-dextromethorphan (PROMETHAZINE-DM) 6.25-15 mg/5 mL Syrp Take 5 mLs by mouth every 4 to 6 hours as needed (cough).    [DISCONTINUED] amLODIPine (NORVASC) 10 MG tablet Take 1 tablet by mouth once daily    [DISCONTINUED] DULoxetine (CYMBALTA) 30 MG capsule Take 30 mg by mouth once daily.    [DISCONTINUED] DULoxetine (CYMBALTA) 60 MG capsule Take 1 capsule (60 mg total) by mouth once daily.    [DISCONTINUED] glipiZIDE (GLUCOTROL) 10 MG TR24 Take 1 tablet (10 mg total) by mouth daily with breakfast.    [DISCONTINUED] losartan (COZAAR) 100 MG tablet Take 1 tablet by mouth once daily    [DISCONTINUED] metFORMIN (GLUCOPHAGE) 1000 MG tablet Take 1 tablet (1,000 mg total) by mouth 2 (two) times daily with meals.    [DISCONTINUED] nirmatrelvir-ritonavir 300 mg (150 mg x 2)-100 mg copackaged tablets (EUA) Take 3 tablets by mouth 2 (two) times daily. Each dose contains 2 nirmatrelvir (pink tablets) and 1 ritonavir (white tablet). Take all 3 tablets together    [DISCONTINUED] rosuvastatin (CRESTOR) 10 MG tablet Take 1 tablet by mouth once daily    [DISCONTINUED] spironolactone (ALDACTONE) 25 MG tablet Take 1 tablet by mouth once daily     No current facility-administered medications on file prior to visit.       Review of patient's allergies indicates:   Allergen Reactions    No known drug allergies        OBJECTIVE:     Vital Signs:  Vitals:    05/16/23 1552 05/16/23 1553  "  BP: (!) 173/72 (!) 163/65   Pulse: 76    Weight: 105.7 kg (233 lb 0.4 oz)    Height: 5' 7" (1.702 m)        Body mass index is 36.5 kg/m².     Physical Exam  Vitals reviewed.   Constitutional:       General: He is not in acute distress.     Appearance: He is overweight. He is not ill-appearing, toxic-appearing or diaphoretic.   Cardiovascular:      Rate and Rhythm: Normal rate.      Pulses: Normal pulses.      Heart sounds: No murmur heard.  Pulmonary:      Effort: Pulmonary effort is normal. No respiratory distress.      Breath sounds: Normal breath sounds.   Neurological:      Mental Status: He is alert.       Health Maintenance Due   Topic Date Due    TETANUS VACCINE  11/11/2015    Abdominal Aortic Aneurysm Screening  12/20/2018    Foot Exam  11/19/2019    Eye Exam  09/09/2022         ASSESSMENT & PLAN:   Mr. Kilo Lam is a 67 y.o. male here for 1 month follow-up. For BP will continue and refill amlodipine, losartan, and spironolactone. For diabetes, will refill glipizide, metformin, and Trulicity. If unable to administer Trulicity will consider Jardiance/referral to endo for further management. Will send referral for eye examination as well. Will give short course of lidex for forehead related tenderness for symptomatic relief. Will give short course of meloxicam to provide relief from joint related pain. Consult placed for rheum already, will coordinate with obtaining sooner appointment. Recommend keeping a diabetes journal and record blood sugars to bring to follow-up appointment.   - Refill and continue amlodipine, losartan, and spironolactone   - Continue and refill glipizide, metformin, and trulicity   - Continue and refill Crestor ASCVD >40%, patient takes daily ASA   - Prescribed Lidex for nadine related tenderness and itching   - Meloxicam for joint pain   - Awaiting rheum appointment   - Referral for optometry  - RTC in 2 months with repeat lipid, A1C, and CMP         Type 2 diabetes mellitus " with diabetic neuropathy, without long-term current use of insulin  -     metFORMIN (GLUCOPHAGE) 1000 MG tablet; Take 1 tablet (1,000 mg total) by mouth 2 (two) times daily with meals.  Dispense: 180 tablet; Refill: 3  -     glipiZIDE (GLUCOTROL) 10 MG TR24; Take 1 tablet (10 mg total) by mouth daily with breakfast.  Dispense: 90 tablet; Refill: 3  -     HEMOGLOBIN A1C; Future; Expected date: 07/31/2023  -     COMPREHENSIVE METABOLIC PANEL; Future; Expected date: 07/31/2023  -     LIPID PANEL; Future; Expected date: 07/31/2023    Hypertension associated with type 2 diabetes mellitus  -     COMPREHENSIVE METABOLIC PANEL; Future; Expected date: 07/31/2023    Arthralgia of multiple joints  -     meloxicam (MOBIC) 7.5 MG tablet; Take 1 tablet (7.5 mg total) by mouth once daily. for 14 days  Dispense: 14 tablet; Refill: 0    Essential hypertension  -     amLODIPine (NORVASC) 10 MG tablet; Take 1 tablet (10 mg total) by mouth once daily.  Dispense: 90 tablet; Refill: 3  -     losartan (COZAAR) 100 MG tablet; Take 1 tablet (100 mg total) by mouth once daily.  Dispense: 90 tablet; Refill: 3  -     spironolactone (ALDACTONE) 25 MG tablet; Take 1 tablet (25 mg total) by mouth once daily.  Dispense: 90 tablet; Refill: 3  -     COMPREHENSIVE METABOLIC PANEL; Future; Expected date: 07/31/2023    Mixed hyperlipidemia  -     rosuvastatin (CRESTOR) 10 MG tablet; Take 1 tablet (10 mg total) by mouth once daily.  Dispense: 90 tablet; Refill: 3  -     LIPID PANEL; Future; Expected date: 07/31/2023    Health maintenance examination  -     Ambulatory referral/consult to Optometry; Future; Expected date: 05/17/2023    Forehead pain  -     fluocinonide (LIDEX) 0.05 % external solution; Apply topically 2 (two) times daily. for 14 days  Dispense: 60 mL; Refill: 0        RTC in 2 motnhs     Discussed with Dr. Rose - staff attestation to follow      Horacio Valentin DO, MSHILL   Internal Medicine, PGY-I  [facility]  [address]  [place] [address]  [phone]    I have discussed A/P with DR Valentin  and agree with plan of action.  Mara Smith.

## 2023-05-16 NOTE — TELEPHONE ENCOUNTER
Dr Homero Bravo in Rhrumatology is getting pt seen this week in Rheumatology clinic/fellows clinic I believe for the RA/?GCA  FYI, DR Mcnamara

## 2023-05-17 ENCOUNTER — OFFICE VISIT (OUTPATIENT)
Dept: RHEUMATOLOGY | Facility: CLINIC | Age: 68
End: 2023-05-17
Payer: MEDICARE

## 2023-05-17 VITALS
HEIGHT: 67 IN | SYSTOLIC BLOOD PRESSURE: 185 MMHG | HEART RATE: 73 BPM | WEIGHT: 237.44 LBS | BODY MASS INDEX: 37.27 KG/M2 | DIASTOLIC BLOOD PRESSURE: 72 MMHG

## 2023-05-17 DIAGNOSIS — M25.50 ARTHRALGIA OF MULTIPLE JOINTS: Primary | ICD-10-CM

## 2023-05-17 PROCEDURE — 3046F HEMOGLOBIN A1C LEVEL >9.0%: CPT | Mod: CPTII,GC,, | Performed by: INTERNAL MEDICINE

## 2023-05-17 PROCEDURE — 99499 RISK ADDL DX/OHS AUDIT: ICD-10-PCS | Mod: S$GLB,,, | Performed by: INTERNAL MEDICINE

## 2023-05-17 PROCEDURE — 1157F ADVNC CARE PLAN IN RCRD: CPT | Mod: CPTII,GC,, | Performed by: INTERNAL MEDICINE

## 2023-05-17 PROCEDURE — 1125F PR PAIN SEVERITY QUANTIFIED, PAIN PRESENT: ICD-10-PCS | Mod: CPTII,GC,, | Performed by: INTERNAL MEDICINE

## 2023-05-17 PROCEDURE — 99214 OFFICE O/P EST MOD 30 MIN: CPT | Performed by: INTERNAL MEDICINE

## 2023-05-17 PROCEDURE — 3078F PR MOST RECENT DIASTOLIC BLOOD PRESSURE < 80 MM HG: ICD-10-PCS | Mod: CPTII,GC,, | Performed by: INTERNAL MEDICINE

## 2023-05-17 PROCEDURE — 4010F PR ACE/ARB THEARPY RXD/TAKEN: ICD-10-PCS | Mod: CPTII,GC,, | Performed by: INTERNAL MEDICINE

## 2023-05-17 PROCEDURE — 1157F PR ADVANCE CARE PLAN OR EQUIV PRESENT IN MEDICAL RECORD: ICD-10-PCS | Mod: CPTII,GC,, | Performed by: INTERNAL MEDICINE

## 2023-05-17 PROCEDURE — 4010F ACE/ARB THERAPY RXD/TAKEN: CPT | Mod: CPTII,GC,, | Performed by: INTERNAL MEDICINE

## 2023-05-17 PROCEDURE — 99204 PR OFFICE/OUTPT VISIT, NEW, LEVL IV, 45-59 MIN: ICD-10-PCS | Mod: GC,,, | Performed by: INTERNAL MEDICINE

## 2023-05-17 PROCEDURE — 1125F AMNT PAIN NOTED PAIN PRSNT: CPT | Mod: CPTII,GC,, | Performed by: INTERNAL MEDICINE

## 2023-05-17 PROCEDURE — 3077F SYST BP >= 140 MM HG: CPT | Mod: CPTII,GC,, | Performed by: INTERNAL MEDICINE

## 2023-05-17 PROCEDURE — 3008F PR BODY MASS INDEX (BMI) DOCUMENTED: ICD-10-PCS | Mod: CPTII,GC,, | Performed by: INTERNAL MEDICINE

## 2023-05-17 PROCEDURE — 1159F PR MEDICATION LIST DOCUMENTED IN MEDICAL RECORD: ICD-10-PCS | Mod: CPTII,GC,, | Performed by: INTERNAL MEDICINE

## 2023-05-17 PROCEDURE — 99204 OFFICE O/P NEW MOD 45 MIN: CPT | Mod: GC,,, | Performed by: INTERNAL MEDICINE

## 2023-05-17 PROCEDURE — 3077F PR MOST RECENT SYSTOLIC BLOOD PRESSURE >= 140 MM HG: ICD-10-PCS | Mod: CPTII,GC,, | Performed by: INTERNAL MEDICINE

## 2023-05-17 PROCEDURE — 99499 UNLISTED E&M SERVICE: CPT | Mod: S$GLB,,, | Performed by: INTERNAL MEDICINE

## 2023-05-17 PROCEDURE — 3008F BODY MASS INDEX DOCD: CPT | Mod: CPTII,GC,, | Performed by: INTERNAL MEDICINE

## 2023-05-17 PROCEDURE — 1159F MED LIST DOCD IN RCRD: CPT | Mod: CPTII,GC,, | Performed by: INTERNAL MEDICINE

## 2023-05-17 PROCEDURE — 3046F PR MOST RECENT HEMOGLOBIN A1C LEVEL > 9.0%: ICD-10-PCS | Mod: CPTII,GC,, | Performed by: INTERNAL MEDICINE

## 2023-05-17 PROCEDURE — 99999 PR PBB SHADOW E&M-EST. PATIENT-LVL IV: CPT | Mod: PBBFAC,GC,, | Performed by: INTERNAL MEDICINE

## 2023-05-17 PROCEDURE — 3078F DIAST BP <80 MM HG: CPT | Mod: CPTII,GC,, | Performed by: INTERNAL MEDICINE

## 2023-05-17 PROCEDURE — 99999 PR PBB SHADOW E&M-EST. PATIENT-LVL IV: ICD-10-PCS | Mod: PBBFAC,GC,, | Performed by: INTERNAL MEDICINE

## 2023-05-17 RX ORDER — PREDNISONE 5 MG/1
TABLET ORAL
Qty: 90 TABLET | Refills: 3 | Status: SHIPPED | OUTPATIENT
Start: 2023-05-17 | End: 2023-07-26

## 2023-05-17 ASSESSMENT — ROUTINE ASSESSMENT OF PATIENT INDEX DATA (RAPID3)
PSYCHOLOGICAL DISTRESS SCORE: 4.4
PATIENT GLOBAL ASSESSMENT SCORE: 7
FATIGUE SCORE: 7.5
TOTAL RAPID3 SCORE: 5.44
MDHAQ FUNCTION SCORE: 1.3
AM STIFFNESS SCORE: 1, YES
PAIN SCORE: 5

## 2023-05-18 ENCOUNTER — TELEPHONE (OUTPATIENT)
Dept: INTERNAL MEDICINE | Facility: CLINIC | Age: 68
End: 2023-05-18
Payer: MEDICARE

## 2023-05-18 DIAGNOSIS — E11.40 TYPE 2 DIABETES MELLITUS WITH DIABETIC NEUROPATHY, WITHOUT LONG-TERM CURRENT USE OF INSULIN: Primary | ICD-10-CM

## 2023-05-18 NOTE — TELEPHONE ENCOUNTER
Aggie/Rigo  Can you schedule this pt to follow up(f/u DM) in resident clinic Tuesday 6/13 with a few day prior BMP/HgbA1C.  He can see anyone available.  I'll be the continuity to assist the resident that day if needed. Thanks so much, Mara Mcnamara

## 2023-05-19 NOTE — PROGRESS NOTES
"Subjective:      Patient ID: Kilo Lam is a 67 y.o. male.    Chief Complaint: Disease Management    67 year old male with PMH of poorly controlled DM2, NAFLD presents for urgent evaluation. Patients pcp concerned for GCA. Patient has been evaluated by his pcp for joint pains, body aches with current workup significant for low positive RF, negative CCP, ESR 90, CRP 21. Patient reports pain in "every" joint of his hands x 2 yrs associated with 1+ hr mourning stiffness and generalized swelling. Patient consistently denies headaches, jaw claudication, visual disturbance, temporal tenderness, etc. He also reports significant pain and ache in his bilateral shoulders and hips, especially in the mourning. Denies alopecia, rash, ulcerations, serositis, uveitis, dactylitis, enthesitis, etc. He has been prescribed mobic, which he has not picked up yet. Otherwise is not using anything for pain.     Review of Systems   Constitutional:  Negative for activity change, appetite change, chills, diaphoresis, fatigue and fever.   HENT:  Negative for congestion, dental problem, drooling and facial swelling.    Eyes:  Negative for visual disturbance.   Respiratory:  Negative for apnea, cough, choking and shortness of breath.    Cardiovascular:  Negative for chest pain and palpitations.   Gastrointestinal:  Negative for abdominal distention, abdominal pain, anal bleeding, blood in stool, constipation, diarrhea, nausea, rectal pain and vomiting.   Musculoskeletal:  Positive for arthralgias, joint swelling and myalgias.   Skin:  Negative for color change and rash.   Neurological:  Negative for tremors, seizures, weakness, light-headedness, numbness and headaches.      Objective:   BP (!) 185/72   Pulse 73   Ht 5' 7" (1.702 m)   Wt 107.7 kg (237 lb 7 oz)   BMI 37.19 kg/m²   Physical Exam   Constitutional: He is oriented to person, place, and time. normal appearance.  Non-toxic appearance. No distress. He does not appear ill. "   HENT:   Head: Normocephalic and atraumatic.   Nose: Nose normal. No rhinorrhea or nasal congestion.   Eyes: Pupils are equal, round, and reactive to light. Conjunctivae are normal. Right eye exhibits no discharge. Left eye exhibits no discharge. No scleral icterus.   Neck: Carotid bruit is not present.   Cardiovascular: Normal rate, regular rhythm, normal heart sounds and normal pulses. Exam reveals no gallop and no friction rub.   No murmur heard.  Pulmonary/Chest: Effort normal and breath sounds normal.   Abdominal: Bowel sounds are normal. He exhibits no distension and no mass. There is no abdominal tenderness. There is no rebound and no guarding. No hernia.   Musculoskeletal:         General: Swelling and tenderness present. Normal range of motion.      Cervical back: Normal range of motion. No rigidity or tenderness.      Comments: Patient reports tenderness that is not augmented throughout his bilateral hands. With pain and synovitis of bilateral wrists.    Lymphadenopathy:     He has no cervical adenopathy.   Neurological: He is alert and oriented to person, place, and time.   Skin: Skin is warm and dry. No rash noted. He is not diaphoretic.   Vitals reviewed.    No data to display     Assessment:     PMR: 67 year old male patient with significant tenderness, stiffness, pain in his bilateral shoulders and hips when he wakes up in the mourning. He consistently denies CGA symptoms such as temple pain, new headache, jaw claudication, etc. He has very poorly controlled DM2, which makes treatment difficult. I have discussed with his PCP and asked him to aggressively manage the patients BG at this time given that we are starting steroids. Have also asked the patient to check BG daily and report results to PCP.   -Start prednisone 15 mg daily at this time. Taper by 2.5 mg every two weeks and continue on 10 mg daily until he sees us.   -Pre DMARDs checked as below, consider initiating IL6 for steroid sparing  therapy .     Joint pain: with reported swelling, stiffness, pain in all joints of bilateral hands especially. With mild synovitis in bilateral wrists. RF low positive CCP negative. ROS and PE not suggestive of SLE/spondyloarthropathy/etc. Consider RA which should benefit from above steroids as well.   -Complete workup with ESMER, HLA B27, Xrs, etc  -Will also check vasculitis labs given low positive RF.   -Pre dmard labs including Hep B/C ordered at this time, will see again in 1 mo and consider further management at that time.       Plan:     Problem List Items Addressed This Visit    None  Visit Diagnoses       Arthralgia of multiple joints    -  Primary    Relevant Orders    HIV 1/2 Ag/Ab (4th Gen)    Hepatitis B surface antigen    HBcAB    Hepatitis B surface antibody    Hepatitis C antibody    Hepatitis A antibody, IgG    Strongyloides IgG Antibodies    Quantiferon Gold TB    RPR    Varicella zoster antibody, IgG    CK    INTERLEUKIN-6    XR Arthritis Survey    ESMER Screen w/Reflex    ANTI-NEUTROPHILIC CYTOPLASMIC ANTIBODY    Myeloperoxidase Antibody (MPO)    Proteinase 3 Autoantibodies    CRYOGLOBULIN    HLA B27 Antigen          Zachary Pizarro

## 2023-05-26 ENCOUNTER — TELEPHONE (OUTPATIENT)
Dept: INTERNAL MEDICINE | Facility: CLINIC | Age: 68
End: 2023-05-26
Payer: MEDICARE

## 2023-05-26 DIAGNOSIS — E11.40 TYPE 2 DIABETES MELLITUS WITH DIABETIC NEUROPATHY, WITHOUT LONG-TERM CURRENT USE OF INSULIN: Primary | ICD-10-CM

## 2023-05-26 RX ORDER — BLOOD-GLUCOSE SENSOR
1 EACH MISCELLANEOUS
Qty: 9 EACH | Refills: 3 | Status: SHIPPED | OUTPATIENT
Start: 2023-05-26 | End: 2024-01-10

## 2023-05-26 RX ORDER — BLOOD-GLUCOSE TRANSMITTER
1 EACH MISCELLANEOUS
Qty: 1 EACH | Refills: 3 | Status: SHIPPED | OUTPATIENT
Start: 2023-05-26 | End: 2024-01-10

## 2023-05-26 NOTE — TELEPHONE ENCOUNTER
----- Message from Brooke Etienne sent at 5/26/2023 11:18 AM CDT -----  Contact: Pt Mobile [phone]  Patient would like to for you to send an order to his insurance company [email or web address] for  an Automatic Dexcom Diabetic Monitoring System  that comes with the patch for your arm please.

## 2023-05-29 ENCOUNTER — TELEPHONE (OUTPATIENT)
Dept: PHARMACY | Facility: CLINIC | Age: 68
End: 2023-05-29
Payer: MEDICARE

## 2023-05-29 DIAGNOSIS — E11.40 TYPE 2 DIABETES MELLITUS WITH DIABETIC NEUROPATHY, WITHOUT LONG-TERM CURRENT USE OF INSULIN: ICD-10-CM

## 2023-05-29 DIAGNOSIS — Z79.52 LONG TERM (CURRENT) USE OF SYSTEMIC STEROIDS: Primary | ICD-10-CM

## 2023-05-30 ENCOUNTER — TELEPHONE (OUTPATIENT)
Dept: INTERNAL MEDICINE | Facility: CLINIC | Age: 68
End: 2023-05-30
Payer: MEDICARE

## 2023-05-30 DIAGNOSIS — E11.40 TYPE 2 DIABETES MELLITUS WITH DIABETIC NEUROPATHY, WITHOUT LONG-TERM CURRENT USE OF INSULIN: Primary | ICD-10-CM

## 2023-05-30 NOTE — TELEPHONE ENCOUNTER
Patient interested in using CGM.  Order placed however per prior authorization team would need usage of insulin several times a day prior to approval.  Patient recently started on prolonged steroid use by Rheumatology.  A1c prior to initiation over 13.  We will refer to endocrinology for glucose management in setting of uncontrolled diabetes along with steroid usage.  Attempt to contact patient 2x to update regarding endocrinology referral however patient unavailable.  Patient has follow-up with our clinic in the next couple of weeks.

## 2023-06-10 RX ORDER — MELOXICAM 7.5 MG/1
7.5 TABLET ORAL DAILY
Qty: 14 TABLET | Refills: 0 | OUTPATIENT
Start: 2023-06-10 | End: 2023-06-24

## 2023-06-14 ENCOUNTER — LAB VISIT (OUTPATIENT)
Dept: LAB | Facility: HOSPITAL | Age: 68
End: 2023-06-14
Payer: MEDICARE

## 2023-06-14 DIAGNOSIS — E78.2 MIXED HYPERLIPIDEMIA: ICD-10-CM

## 2023-06-14 DIAGNOSIS — E11.59 HYPERTENSION ASSOCIATED WITH TYPE 2 DIABETES MELLITUS: ICD-10-CM

## 2023-06-14 DIAGNOSIS — E11.40 TYPE 2 DIABETES MELLITUS WITH DIABETIC NEUROPATHY, WITHOUT LONG-TERM CURRENT USE OF INSULIN: ICD-10-CM

## 2023-06-14 DIAGNOSIS — I15.2 HYPERTENSION ASSOCIATED WITH TYPE 2 DIABETES MELLITUS: ICD-10-CM

## 2023-06-14 DIAGNOSIS — I10 ESSENTIAL HYPERTENSION: ICD-10-CM

## 2023-06-14 LAB
ALBUMIN SERPL BCP-MCNC: 3.6 G/DL (ref 3.5–5.2)
ALP SERPL-CCNC: 55 U/L (ref 55–135)
ALT SERPL W/O P-5'-P-CCNC: 16 U/L (ref 10–44)
ANION GAP SERPL CALC-SCNC: 9 MMOL/L (ref 8–16)
AST SERPL-CCNC: 13 U/L (ref 10–40)
BILIRUB SERPL-MCNC: 0.3 MG/DL (ref 0.1–1)
BUN SERPL-MCNC: 15 MG/DL (ref 8–23)
CALCIUM SERPL-MCNC: 9.7 MG/DL (ref 8.7–10.5)
CHLORIDE SERPL-SCNC: 103 MMOL/L (ref 95–110)
CHOLEST SERPL-MCNC: 141 MG/DL (ref 120–199)
CHOLEST/HDLC SERPL: 2.3 {RATIO} (ref 2–5)
CO2 SERPL-SCNC: 25 MMOL/L (ref 23–29)
CREAT SERPL-MCNC: 0.9 MG/DL (ref 0.5–1.4)
EST. GFR  (NO RACE VARIABLE): >60 ML/MIN/1.73 M^2
ESTIMATED AVG GLUCOSE: 295 MG/DL (ref 68–131)
GLUCOSE SERPL-MCNC: 253 MG/DL (ref 70–110)
HBA1C MFR BLD: 11.9 % (ref 4–5.6)
HDLC SERPL-MCNC: 62 MG/DL (ref 40–75)
HDLC SERPL: 44 % (ref 20–50)
LDLC SERPL CALC-MCNC: 56.2 MG/DL (ref 63–159)
NONHDLC SERPL-MCNC: 79 MG/DL
POTASSIUM SERPL-SCNC: 5 MMOL/L (ref 3.5–5.1)
PROT SERPL-MCNC: 7 G/DL (ref 6–8.4)
SODIUM SERPL-SCNC: 137 MMOL/L (ref 136–145)
TRIGL SERPL-MCNC: 114 MG/DL (ref 30–150)

## 2023-06-14 PROCEDURE — 80053 COMPREHEN METABOLIC PANEL: CPT

## 2023-06-14 PROCEDURE — 36415 COLL VENOUS BLD VENIPUNCTURE: CPT

## 2023-06-14 PROCEDURE — 83036 HEMOGLOBIN GLYCOSYLATED A1C: CPT

## 2023-06-14 PROCEDURE — 80061 LIPID PANEL: CPT

## 2023-06-15 ENCOUNTER — TELEPHONE (OUTPATIENT)
Dept: INTERNAL MEDICINE | Facility: CLINIC | Age: 68
End: 2023-06-15
Payer: MEDICARE

## 2023-06-15 NOTE — TELEPHONE ENCOUNTER
----- Message from Rubin Valdez sent at 6/15/2023 11:34 AM CDT -----  Contact: [phone] @ patient  Good morning patient would like a call back about him some more pain med. Patient says he is in very bad pain. Patient would also like for the doc to send a request over to his insurance [employer] for dexicon. Please give patient a call back [phone]

## 2023-06-16 ENCOUNTER — TELEPHONE (OUTPATIENT)
Dept: PRIMARY CARE CLINIC | Facility: CLINIC | Age: 68
End: 2023-06-16
Payer: MEDICARE

## 2023-06-16 NOTE — TELEPHONE ENCOUNTER
----- Message from Ricardo Nagy sent at 6/16/2023 10:14 AM CDT -----  Contact: Pt [phone]  Pt called in regards to he states [facility] will need medical necessity form clinicals and orders for his dexcom please fax over to  please call pt and advise

## 2023-06-23 ENCOUNTER — TELEPHONE (OUTPATIENT)
Dept: ADMINISTRATIVE | Facility: HOSPITAL | Age: 68
End: 2023-06-23
Payer: MEDICARE

## 2023-06-23 ENCOUNTER — TELEPHONE (OUTPATIENT)
Dept: INTERNAL MEDICINE | Facility: CLINIC | Age: 68
End: 2023-06-23
Payer: MEDICARE

## 2023-06-23 ENCOUNTER — PATIENT MESSAGE (OUTPATIENT)
Dept: INTERNAL MEDICINE | Facility: CLINIC | Age: 68
End: 2023-06-23
Payer: MEDICARE

## 2023-06-23 DIAGNOSIS — E11.40 TYPE 2 DIABETES MELLITUS WITH DIABETIC NEUROPATHY, WITHOUT LONG-TERM CURRENT USE OF INSULIN: ICD-10-CM

## 2023-06-23 DIAGNOSIS — M25.50 ARTHRALGIA OF MULTIPLE JOINTS: Primary | ICD-10-CM

## 2023-06-23 RX ORDER — MELOXICAM 7.5 MG/1
7.5 TABLET ORAL DAILY
Qty: 21 TABLET | Refills: 0 | Status: SHIPPED | OUTPATIENT
Start: 2023-06-23 | End: 2023-07-14

## 2023-06-23 NOTE — TELEPHONE ENCOUNTER
Refilled meloxicam, Cr 0.9 on most recent labs. Patient being followed up with rheumatology. Per prior auth team, patient unable to get Dexcom with insurance due to him not being on high intensity insulin or insulin pump. Endo referral. Next appointment in a month.

## 2023-06-23 NOTE — TELEPHONE ENCOUNTER
LVM notifying pt of:  let him know I refilled the meloxicam, also that I tried ordering DEXCOM but per our prio authorization team his insurance will NOT approve it unless he's on insulin 3x daily, or a insulin pump. I sent a referral to endocrinology again and they can work with him for this.

## 2023-07-21 ENCOUNTER — PATIENT MESSAGE (OUTPATIENT)
Dept: INTERNAL MEDICINE | Facility: CLINIC | Age: 68
End: 2023-07-21
Payer: MEDICARE

## 2023-07-26 ENCOUNTER — OFFICE VISIT (OUTPATIENT)
Dept: RHEUMATOLOGY | Facility: CLINIC | Age: 68
End: 2023-07-26
Payer: MEDICARE

## 2023-07-26 ENCOUNTER — HOSPITAL ENCOUNTER (OUTPATIENT)
Dept: RADIOLOGY | Facility: HOSPITAL | Age: 68
Discharge: HOME | End: 2023-07-26
Attending: INTERNAL MEDICINE
Payer: MEDICARE

## 2023-07-26 ENCOUNTER — TELEPHONE (OUTPATIENT)
Dept: RHEUMATOLOGY | Facility: CLINIC | Age: 68
End: 2023-07-26
Payer: MEDICARE

## 2023-07-26 VITALS
HEART RATE: 72 BPM | DIASTOLIC BLOOD PRESSURE: 76 MMHG | BODY MASS INDEX: 35.63 KG/M2 | HEIGHT: 67 IN | WEIGHT: 227 LBS | SYSTOLIC BLOOD PRESSURE: 180 MMHG

## 2023-07-26 DIAGNOSIS — R52 GENERALIZED PAIN: ICD-10-CM

## 2023-07-26 DIAGNOSIS — R52 GENERALIZED PAIN: Primary | ICD-10-CM

## 2023-07-26 DIAGNOSIS — E11.49 OTHER DIABETIC NEUROLOGICAL COMPLICATION ASSOCIATED WITH TYPE 2 DIABETES MELLITUS: ICD-10-CM

## 2023-07-26 DIAGNOSIS — G63 NEUROPATHY ASSOCIATED WITH CANCER: ICD-10-CM

## 2023-07-26 DIAGNOSIS — C80.1 NEUROPATHY ASSOCIATED WITH CANCER: ICD-10-CM

## 2023-07-26 DIAGNOSIS — R22.42 LOCALIZED SWELLING OF LEFT LOWER EXTREMITY: ICD-10-CM

## 2023-07-26 PROCEDURE — 71046 X-RAY EXAM CHEST 2 VIEWS: CPT | Mod: TC

## 2023-07-26 PROCEDURE — 1125F AMNT PAIN NOTED PAIN PRSNT: CPT | Mod: CPTII,GC,S$GLB, | Performed by: INTERNAL MEDICINE

## 2023-07-26 PROCEDURE — 3008F BODY MASS INDEX DOCD: CPT | Mod: CPTII,GC,S$GLB, | Performed by: INTERNAL MEDICINE

## 2023-07-26 PROCEDURE — 3078F DIAST BP <80 MM HG: CPT | Mod: CPTII,GC,S$GLB, | Performed by: INTERNAL MEDICINE

## 2023-07-26 PROCEDURE — 72100 X-RAY EXAM L-S SPINE 2/3 VWS: CPT | Mod: 26,,, | Performed by: RADIOLOGY

## 2023-07-26 PROCEDURE — 72100 XR LUMBAR SPINE AP AND LATERAL: ICD-10-PCS | Mod: 26,,, | Performed by: RADIOLOGY

## 2023-07-26 PROCEDURE — 4010F ACE/ARB THERAPY RXD/TAKEN: CPT | Mod: CPTII,GC,S$GLB, | Performed by: INTERNAL MEDICINE

## 2023-07-26 PROCEDURE — 1100F PR PT FALLS ASSESS DOC 2+ FALLS/FALL W/INJURY/YR: ICD-10-PCS | Mod: CPTII,GC,S$GLB, | Performed by: INTERNAL MEDICINE

## 2023-07-26 PROCEDURE — 99213 OFFICE O/P EST LOW 20 MIN: CPT | Mod: GC,S$GLB,, | Performed by: INTERNAL MEDICINE

## 2023-07-26 PROCEDURE — 3077F PR MOST RECENT SYSTOLIC BLOOD PRESSURE >= 140 MM HG: ICD-10-PCS | Mod: CPTII,GC,S$GLB, | Performed by: INTERNAL MEDICINE

## 2023-07-26 PROCEDURE — 3008F PR BODY MASS INDEX (BMI) DOCUMENTED: ICD-10-PCS | Mod: CPTII,GC,S$GLB, | Performed by: INTERNAL MEDICINE

## 2023-07-26 PROCEDURE — 71046 X-RAY EXAM CHEST 2 VIEWS: CPT | Mod: 26,,, | Performed by: RADIOLOGY

## 2023-07-26 PROCEDURE — 99999 PR PBB SHADOW E&M-EST. PATIENT-LVL V: CPT | Mod: PBBFAC,GC,, | Performed by: INTERNAL MEDICINE

## 2023-07-26 PROCEDURE — 1100F PTFALLS ASSESS-DOCD GE2>/YR: CPT | Mod: CPTII,GC,S$GLB, | Performed by: INTERNAL MEDICINE

## 2023-07-26 PROCEDURE — 1125F PR PAIN SEVERITY QUANTIFIED, PAIN PRESENT: ICD-10-PCS | Mod: CPTII,GC,S$GLB, | Performed by: INTERNAL MEDICINE

## 2023-07-26 PROCEDURE — 1159F MED LIST DOCD IN RCRD: CPT | Mod: CPTII,GC,S$GLB, | Performed by: INTERNAL MEDICINE

## 2023-07-26 PROCEDURE — 3078F PR MOST RECENT DIASTOLIC BLOOD PRESSURE < 80 MM HG: ICD-10-PCS | Mod: CPTII,GC,S$GLB, | Performed by: INTERNAL MEDICINE

## 2023-07-26 PROCEDURE — 4010F PR ACE/ARB THEARPY RXD/TAKEN: ICD-10-PCS | Mod: CPTII,GC,S$GLB, | Performed by: INTERNAL MEDICINE

## 2023-07-26 PROCEDURE — 72100 X-RAY EXAM L-S SPINE 2/3 VWS: CPT | Mod: TC

## 2023-07-26 PROCEDURE — 99999 PR PBB SHADOW E&M-EST. PATIENT-LVL V: ICD-10-PCS | Mod: PBBFAC,GC,, | Performed by: INTERNAL MEDICINE

## 2023-07-26 PROCEDURE — 71046 XR CHEST PA AND LATERAL: ICD-10-PCS | Mod: 26,,, | Performed by: RADIOLOGY

## 2023-07-26 PROCEDURE — 1157F ADVNC CARE PLAN IN RCRD: CPT | Mod: CPTII,GC,S$GLB, | Performed by: INTERNAL MEDICINE

## 2023-07-26 PROCEDURE — 3288F PR FALLS RISK ASSESSMENT DOCUMENTED: ICD-10-PCS | Mod: CPTII,GC,S$GLB, | Performed by: INTERNAL MEDICINE

## 2023-07-26 PROCEDURE — 3077F SYST BP >= 140 MM HG: CPT | Mod: CPTII,GC,S$GLB, | Performed by: INTERNAL MEDICINE

## 2023-07-26 PROCEDURE — 3046F HEMOGLOBIN A1C LEVEL >9.0%: CPT | Mod: CPTII,GC,S$GLB, | Performed by: INTERNAL MEDICINE

## 2023-07-26 PROCEDURE — 3046F PR MOST RECENT HEMOGLOBIN A1C LEVEL > 9.0%: ICD-10-PCS | Mod: CPTII,GC,S$GLB, | Performed by: INTERNAL MEDICINE

## 2023-07-26 PROCEDURE — 1159F PR MEDICATION LIST DOCUMENTED IN MEDICAL RECORD: ICD-10-PCS | Mod: CPTII,GC,S$GLB, | Performed by: INTERNAL MEDICINE

## 2023-07-26 PROCEDURE — 3288F FALL RISK ASSESSMENT DOCD: CPT | Mod: CPTII,GC,S$GLB, | Performed by: INTERNAL MEDICINE

## 2023-07-26 PROCEDURE — 99213 PR OFFICE/OUTPT VISIT, EST, LEVL III, 20-29 MIN: ICD-10-PCS | Mod: GC,S$GLB,, | Performed by: INTERNAL MEDICINE

## 2023-07-26 PROCEDURE — 1157F PR ADVANCE CARE PLAN OR EQUIV PRESENT IN MEDICAL RECORD: ICD-10-PCS | Mod: CPTII,GC,S$GLB, | Performed by: INTERNAL MEDICINE

## 2023-07-26 ASSESSMENT — ROUTINE ASSESSMENT OF PATIENT INDEX DATA (RAPID3)
PAIN SCORE: 7
PATIENT GLOBAL ASSESSMENT SCORE: 5
FATIGUE SCORE: 0
MDHAQ FUNCTION SCORE: 0.5
AM STIFFNESS SCORE: 1, YES
TOTAL RAPID3 SCORE: 4.56
WHEN YOU AWAKENED IN THE MORNING OVER THE LAST WEEK, PLEASE INDICATE THE AMOUNT OF TIME IT TAKES UNTIL YOU ARE AS LIMBER AS YOU WILL BE FOR THE DAY: 30 MINUTES
PSYCHOLOGICAL DISTRESS SCORE: 2.2

## 2023-07-26 NOTE — TELEPHONE ENCOUNTER
Patient was scheduled today for labs, xrays, CT, US, Echo and neurology appointment. The reminders were handed to the patient . I sent a message to [facility] Acuna to schedule the patient for NM bone scan and Feng Nagy to schedule EMG. I will get with Dr Thomas's nurse tomorrow to schedule dermatology appointment. I have given the patient his AVS

## 2023-07-28 ENCOUNTER — TELEPHONE (OUTPATIENT)
Dept: WOUND CARE | Facility: HOSPITAL | Age: 68
End: 2023-07-28
Payer: MEDICARE

## 2023-07-28 NOTE — TELEPHONE ENCOUNTER
Spoke to patient in regards to wound care referral. Patient stated that he has wounds on his fingers, but he feels they are healing. He stated he did not feel he needed to schedule a wound care appointment at this time.

## 2023-07-31 ENCOUNTER — OFFICE VISIT (OUTPATIENT)
Dept: INTERNAL MEDICINE | Facility: CLINIC | Age: 68
End: 2023-07-31
Payer: MEDICARE

## 2023-07-31 VITALS
SYSTOLIC BLOOD PRESSURE: 142 MMHG | WEIGHT: 232.13 LBS | OXYGEN SATURATION: 97 % | HEART RATE: 89 BPM | DIASTOLIC BLOOD PRESSURE: 72 MMHG | HEIGHT: 67 IN | BODY MASS INDEX: 36.43 KG/M2

## 2023-07-31 DIAGNOSIS — M54.50 LOW BACK PAIN, UNSPECIFIED BACK PAIN LATERALITY, UNSPECIFIED CHRONICITY, UNSPECIFIED WHETHER SCIATICA PRESENT: ICD-10-CM

## 2023-07-31 DIAGNOSIS — Z00.00 HEALTHCARE MAINTENANCE: ICD-10-CM

## 2023-07-31 DIAGNOSIS — E11.40 TYPE 2 DIABETES MELLITUS WITH DIABETIC NEUROPATHY, WITHOUT LONG-TERM CURRENT USE OF INSULIN: Primary | ICD-10-CM

## 2023-07-31 PROCEDURE — 1125F AMNT PAIN NOTED PAIN PRSNT: CPT | Mod: CPTII,GC,S$GLB,

## 2023-07-31 PROCEDURE — 1101F PT FALLS ASSESS-DOCD LE1/YR: CPT | Mod: CPTII,GC,S$GLB,

## 2023-07-31 PROCEDURE — 1157F PR ADVANCE CARE PLAN OR EQUIV PRESENT IN MEDICAL RECORD: ICD-10-PCS | Mod: CPTII,GC,S$GLB,

## 2023-07-31 PROCEDURE — 99999 PR PBB SHADOW E&M-EST. PATIENT-LVL III: ICD-10-PCS | Mod: PBBFAC,GC,,

## 2023-07-31 PROCEDURE — 3288F PR FALLS RISK ASSESSMENT DOCUMENTED: ICD-10-PCS | Mod: CPTII,GC,S$GLB,

## 2023-07-31 PROCEDURE — 3077F SYST BP >= 140 MM HG: CPT | Mod: CPTII,GC,S$GLB,

## 2023-07-31 PROCEDURE — 3078F PR MOST RECENT DIASTOLIC BLOOD PRESSURE < 80 MM HG: ICD-10-PCS | Mod: CPTII,GC,S$GLB,

## 2023-07-31 PROCEDURE — 3078F DIAST BP <80 MM HG: CPT | Mod: CPTII,GC,S$GLB,

## 2023-07-31 PROCEDURE — 99213 OFFICE O/P EST LOW 20 MIN: CPT | Mod: GC,S$GLB,,

## 2023-07-31 PROCEDURE — 3046F PR MOST RECENT HEMOGLOBIN A1C LEVEL > 9.0%: ICD-10-PCS | Mod: CPTII,GC,S$GLB,

## 2023-07-31 PROCEDURE — 3008F PR BODY MASS INDEX (BMI) DOCUMENTED: ICD-10-PCS | Mod: CPTII,GC,S$GLB,

## 2023-07-31 PROCEDURE — 3288F FALL RISK ASSESSMENT DOCD: CPT | Mod: CPTII,GC,S$GLB,

## 2023-07-31 PROCEDURE — 4010F ACE/ARB THERAPY RXD/TAKEN: CPT | Mod: CPTII,GC,S$GLB,

## 2023-07-31 PROCEDURE — 1157F ADVNC CARE PLAN IN RCRD: CPT | Mod: CPTII,GC,S$GLB,

## 2023-07-31 PROCEDURE — 99213 PR OFFICE/OUTPT VISIT, EST, LEVL III, 20-29 MIN: ICD-10-PCS | Mod: GC,S$GLB,,

## 2023-07-31 PROCEDURE — 99999 PR PBB SHADOW E&M-EST. PATIENT-LVL III: CPT | Mod: PBBFAC,GC,,

## 2023-07-31 PROCEDURE — 3077F PR MOST RECENT SYSTOLIC BLOOD PRESSURE >= 140 MM HG: ICD-10-PCS | Mod: CPTII,GC,S$GLB,

## 2023-07-31 PROCEDURE — 1101F PR PT FALLS ASSESS DOC 0-1 FALLS W/OUT INJ PAST YR: ICD-10-PCS | Mod: CPTII,GC,S$GLB,

## 2023-07-31 PROCEDURE — 4010F PR ACE/ARB THEARPY RXD/TAKEN: ICD-10-PCS | Mod: CPTII,GC,S$GLB,

## 2023-07-31 PROCEDURE — 1125F PR PAIN SEVERITY QUANTIFIED, PAIN PRESENT: ICD-10-PCS | Mod: CPTII,GC,S$GLB,

## 2023-07-31 PROCEDURE — 3008F BODY MASS INDEX DOCD: CPT | Mod: CPTII,GC,S$GLB,

## 2023-07-31 PROCEDURE — 3046F HEMOGLOBIN A1C LEVEL >9.0%: CPT | Mod: CPTII,GC,S$GLB,

## 2023-07-31 RX ORDER — DULAGLUTIDE 1.5 MG/.5ML
1.5 INJECTION, SOLUTION SUBCUTANEOUS
Qty: 4 PEN | Refills: 3 | Status: SHIPPED | OUTPATIENT
Start: 2023-07-31 | End: 2023-12-13 | Stop reason: SDUPTHER

## 2023-07-31 RX ORDER — GABAPENTIN 300 MG/1
300 CAPSULE ORAL 3 TIMES DAILY
Qty: 90 CAPSULE | Refills: 2 | Status: ON HOLD | OUTPATIENT
Start: 2023-07-31 | End: 2024-03-08

## 2023-07-31 NOTE — PROGRESS NOTES
"  Subjective     Chief Complaint:  Follow-up    History of Present Illness:  Mr. Kilo Lam is a 67 y.o. male significant history of uncontrolled diabetes last A1c 11, being evaluated and worked up with Rheumatology for bilateral hand and foot  with increase in inflammatory markers, who is here for clinic follow-up. He still complains of hand and foot pain with low back pain as well. Back pain has been present before and responded well to PT. Mobic provided minimal relief with hand pain. He has numerous scans and imaging pending per rheumatology for their work-up. He expresses anxiety saying this pain has altered his personality and that he was previously more "upbeat" and now finding periods of agitation due to the pain. Sugars at home previously were >250 now he is noticing periods of 110s-120s and believes it is improving.     Review of Systems   Constitutional:  Negative for chills, diaphoresis, fever and malaise/fatigue.   HENT:  Negative for congestion and sinus pain.    Respiratory:  Negative for shortness of breath and wheezing.    Cardiovascular:  Negative for chest pain and palpitations.   Gastrointestinal:  Negative for abdominal pain, heartburn, nausea and vomiting.   Musculoskeletal:  Positive for back pain and joint pain. Negative for myalgias and neck pain.   Skin:  Positive for rash.   Neurological:  Positive for weakness. Negative for focal weakness and seizures.   Psychiatric/Behavioral:  Negative for depression.      PAST HISTORY:     Past Medical History:   Diagnosis Date    Anxiety and depression 11/14/2018    Carpal tunnel syndrome, bilateral 4/9/2013    S/p Lt Carpal tunnel release 11/2011     DJD (degenerative joint disease) of lumbar spine 2/9/2015    Essential hypertension 11/6/2012    Fatty liver 11/26/2012    2012: US of ABD showed: Severe hepatic steatosis and  Hepatomegaly.    Mixed hyperlipidemia 12/5/2014    Morbid obesity 4/9/2013    BMI= 41.2 on 04/09/2013     Type 2 diabetes " mellitus with diabetic neuropathy, without long-term current use of insulin 2014       Past Surgical History:   Procedure Laterality Date    CARPAL TUNNEL RELEASE Left 2011    no relief of hand symptoms    COLONOSCOPY  2004    normal, repeat in 7 yrs    COLONOSCOPY N/A 3/27/2019    Procedure: COLONOSCOPY;  Surgeon: Smooth German MD;  Location: [facility] ([address] [place] [address]);  Service: Endoscopy;  Laterality: N/A;    INCISIONAL HERNIA REPAIR Left 2012    inguinal    SHOULDER SURGERY Left        Family History   Problem Relation Age of Onset    Diabetes Mother     Diabetes Father     Heart failure Father     Cancer Daughter 14        Leukemia, passed    Cancer Other         niece, Lung cancer    Liver disease Neg Hx     Colon cancer Neg Hx        Social History     Tobacco Use    Smoking status: Former     Current packs/day: 0.00     Types: Cigarettes     Quit date: 2009     Years since quittin.5    Smokeless tobacco: Never   Substance Use Topics    Alcohol use: No    Drug use: No       MEDICATIONS & ALLERGIES:     Current Outpatient Medications on File Prior to Visit   Medication Sig    amLODIPine (NORVASC) 10 MG tablet Take 1 tablet (10 mg total) by mouth once daily.    aspirin (ECOTRIN) 81 MG EC tablet Take 81 mg by mouth once daily.    betamethasone valerate 0.1% (VALISONE) 0.1 % Oint Apply topically 2 (two) times daily.    blood sugar diagnostic Strp 1 strip by Misc.(Non-Drug; Combo Route) route before meals and at bedtime as needed. (Patient not taking: Reported on 2023)    blood-glucose meter kit To check BG 3 times daily, to use with insurance preferred meter    blood-glucose sensor (DEXCOM G6 SENSOR) [place] 1 each by Misc.(Non-Drug; Combo Route) route every 10 days.    blood-glucose transmitter (DEXCOM G6 TRANSMITTER) [place] 1 Device by Misc.(Non-Drug; Combo Route) route every 3 (three) months.    cetirizine (ZYRTEC) 10 MG tablet Take 1 tablet (10 mg total) by mouth every evening. (Patient  "not taking: Reported on 8/26/2022)    fluticasone (FLONASE) 50 mcg/actuation nasal spray 1 spray by Nasal route.    glipiZIDE (GLUCOTROL) 10 MG TR24 Take 1 tablet (10 mg total) by mouth daily with breakfast.    lancets (LANCETS,ULTRA THIN) Misc 1 application by Misc.(Non-Drug; Combo Route) route before meals and at bedtime as needed. (Patient not taking: Reported on 5/17/2023)    losartan (COZAAR) 100 MG tablet Take 1 tablet (100 mg total) by mouth once daily.    metFORMIN (GLUCOPHAGE) 1000 MG tablet Take 1 tablet (1,000 mg total) by mouth 2 (two) times daily with meals.    rosuvastatin (CRESTOR) 10 MG tablet Take 1 tablet (10 mg total) by mouth once daily.    spironolactone (ALDACTONE) 25 MG tablet Take 1 tablet (25 mg total) by mouth once daily.    [DISCONTINUED] dulaglutide (TRULICITY) 0.75 mg/0.5 mL pen injector Inject 0.75 mg into the skin every 7 days. (Patient not taking: Reported on 5/17/2023)     No current facility-administered medications on file prior to visit.       Review of patient's allergies indicates:   Allergen Reactions    No known drug allergies        OBJECTIVE:     Vital Signs:  Vitals:    07/31/23 1420 07/31/23 1520   BP: (!) 174/73 (!) 142/72   BP Location: Right arm    Patient Position: Sitting    BP Method: Medium (Manual)    Pulse: 89    SpO2: 97%    Weight: 105.3 kg (232 lb 2.3 oz)    Height: 5' 7" (1.702 m)        Body mass index is 36.36 kg/m².     Physical Exam  Constitutional:       General: He is not in acute distress.     Appearance: He is obese. He is not toxic-appearing or diaphoretic.   Cardiovascular:      Rate and Rhythm: Normal rate and regular rhythm.      Pulses: Normal pulses.   Pulmonary:      Effort: Pulmonary effort is normal. No respiratory distress.      Breath sounds: Normal breath sounds. No wheezing.   Abdominal:      General: There is no distension.      Tenderness: There is no abdominal tenderness.   Musculoskeletal:         General: Swelling and tenderness " present.      Right hand: Tenderness present. Decreased strength. Decreased sensation.      Left hand: Tenderness present. Decreased strength. Decreased sensation.      Right foot: Tenderness present.      Left foot: Tenderness present.   Neurological:      General: No focal deficit present.      Mental Status: He is alert and oriented to person, place, and time.   Psychiatric:         Mood and Affect: Mood normal.         Behavior: Behavior normal.     Health Maintenance Due   Topic Date Due    Shingles Vaccine (1 of 2) Never done    TETANUS VACCINE  11/11/2015    Abdominal Aortic Aneurysm Screening  12/20/2018    Foot Exam  11/19/2019    Eye Exam  09/09/2022    COVID-19 Vaccine (5 - Moderna series) 05/31/2023         ASSESSMENT & PLAN:   Mr. Kilo Lam is a 67 y.o. male here for follow-up. Symptoms consistent with peripheral neuropathy most likely 2/2 to diabetes. Will start gabapentin, instructed to being taking nightly then slowly increase to max 3x daily. Will further increase trulicty dosage from 0.75mg to 1.5mg weekly. Goal if improved A1C is to d/c glipizide if well controlled. Advised to notify clinic if lower sugars noted or symptoms of low blood sugar. Referral to [facility] for back pain.   - Gabapentin 300mg TID   - Referral for [facility]   - Increased trulicity to 1.5 from 0.75mg. Plan to wean off glipizide as A1C improves   - RTC in 3 months for A1C recheck and maintenance       Type 2 diabetes mellitus with diabetic neuropathy, without long-term current use of insulin  -     gabapentin (NEURONTIN) 300 MG capsule; Take 1 capsule (300 mg total) by mouth 3 (three) times daily.  Dispense: 90 capsule; Refill: 2  -     dulaglutide (TRULICITY) 1.5 mg/0.5 mL pen injector; Inject 1.5 mg into the skin every 7 days.  Dispense: 4 pen ; Refill: 3    Low back pain, unspecified back pain laterality, unspecified chronicity, unspecified whether sciatica present  -     Ambulatory referral/consult to  [facility]; Future; Expected date: 08/07/2023        RTC in 3 months     Discussed with Dr. Solano - staff attestation to follow      Horacio Valentin DO, MSB   Internal Medicine, PGY-2  [facility]  [address] [place] [address]  [phone]

## 2023-07-31 NOTE — PROGRESS NOTES
I have personally reviewed the history, confirmed exam findings, and discussed assessment and plan with fellow.

## 2023-07-31 NOTE — PROGRESS NOTES
"Subjective:      Patient ID: Kilo Lam is a 67 y.o. male.    Chief Complaint: Disease Management    5/17/23: 67 year old male with PMH of poorly controlled DM2, NAFLD presents for urgent evaluation. Patients pcp concerned for GCA. Patient has been evaluated by his pcp for joint pains, body aches with current workup significant for low positive RF, negative CCP, ESR 90, CRP 21. Patient reports pain in "every" joint of his hands x 2 yrs associated with 1+ hr mourning stiffness and generalized swelling. Patient consistently denies headaches, jaw claudication, visual disturbance, temporal tenderness, etc. He also reports significant pain and ache in his bilateral shoulders and hips, especially in the mourning. Denies alopecia, rash, ulcerations, serositis, uveitis, dactylitis, enthesitis, etc. He has been prescribed mobic, which he has not picked up yet. Otherwise is not using anything for pain.     7/26/23: Patient continues to have significant joint pain and stiffness, especially in his hands and feet bilaterally. He has not completed ordered tests. Trial of low dose prednisone for suspected PMR was not helpful. He continues to deny symptoms of GCA. He has continued itching and excoriations on his scalp secondary to intractable itching. He has not seen derm. He has unhealed burn marks on his fingers secondary to burning his fingers while preparing foot. No s/s of infection.     Review of Systems   Constitutional:  Negative for activity change, appetite change, chills, diaphoresis, fatigue and fever.   HENT:  Negative for congestion, dental problem, drooling and facial swelling.    Eyes:  Negative for visual disturbance.   Respiratory:  Negative for apnea, cough, choking and shortness of breath.    Cardiovascular:  Negative for chest pain and palpitations.   Gastrointestinal:  Negative for abdominal distention, abdominal pain, anal bleeding, blood in stool, constipation, diarrhea, nausea, rectal pain and " "vomiting.   Musculoskeletal:  Positive for arthralgias, joint swelling and myalgias.   Skin:  Negative for color change and rash.   Neurological:  Negative for tremors, seizures, weakness, light-headedness, numbness and headaches.      Objective:   BP (!) 180/76   Pulse 72   Ht 5' 7" (1.702 m)   Wt 103 kg (227 lb)   BMI 35.55 kg/m²   Physical Exam   Constitutional: He is oriented to person, place, and time. normal appearance.  Non-toxic appearance. No distress. He does not appear ill.   HENT:   Head: Normocephalic and atraumatic.   Nose: Nose normal. No rhinorrhea or nasal congestion.   Eyes: Pupils are equal, round, and reactive to light. Conjunctivae are normal. Right eye exhibits no discharge. Left eye exhibits no discharge. No scleral icterus.   Neck: Carotid bruit is not present.   Cardiovascular: Normal rate, regular rhythm, normal heart sounds and normal pulses. Exam reveals no gallop and no friction rub.   No murmur heard.  Pulmonary/Chest: Effort normal and breath sounds normal.   Abdominal: Bowel sounds are normal. He exhibits no distension and no mass. There is no abdominal tenderness. There is no rebound and no guarding. No hernia.   Musculoskeletal:         General: Swelling and tenderness present. Normal range of motion.      Cervical back: Normal range of motion. No rigidity or tenderness.      Comments: Patient reports tenderness that is not augmented throughout his bilateral hands.    Lymphadenopathy:     He has no cervical adenopathy.   Neurological: He is alert and oriented to person, place, and time.   Skin: Skin is warm and dry. No rash noted. He is not diaphoretic.   Excoriations of scalp. Burns on fingers without signs of infection.    Vitals reviewed.     Assessment:     1. Generalized pain    2. Neuropathy associated with cancer    3. Other diabetic neurological complication associated with type 2 diabetes mellitus    4. Localized swelling of left lower extremity      Generalized pain: " Etiology unclear. RF low positive. With significant stiffness, though not much synovitis on exam. Previous workup including labs and imaging not completed by patient unfortunately, will ask him to get this done. Will add on repeat inflammatory markers/cbc/cmp as well as CK for component of reported weakness. Inflammatory markers elevated, will also try to get NM bone scan vs CT CAP for further eval.   -Follow up results of labs and imaging.   -CT CAP ordered  -NM Bone scan ordered    Neuropathy: Likely related to long standing DM, will order EMG and Neurology referral for further evaluation.   -EMG/Neuro referral ordered    LLE swelling: Unclear etiology, consider undiagnosed CHF with multiple comorbidities.   -Echo/BNP ordered  -US LE ordered    Sosa: secondary to not being able to feel hot surfaces while preparing food. Without signs of active infection. With poor wound healing 2/2 DM.   -Wound care referral.     Pruritus:   -Dermatology referral.     3 mo follow up or sooner if needed.     Plan:     Problem List Items Addressed This Visit    None  Visit Diagnoses       Generalized pain    -  Primary    Relevant Orders    CK (Completed)    CBC W/ AUTO DIFFERENTIAL (Completed)    COMPREHENSIVE METABOLIC PANEL (Completed)    Sedimentation rate (Completed)    C-REACTIVE PROTEIN (Completed)    Ambulatory referral/consult to Wound Clinic    Ambulatory referral/consult to Dermatology    X-Ray Lumbar Spine AP And Lateral (Completed)    EMG W/ ULTRASOUND AND NERVE CONDUCTION TEST 2 Extremities    Ambulatory referral/consult to Neurology    Echo    B-TYPE NATRIURETIC PEPTIDE (Completed)    NM Bone Scan Whole Body    CT Chest Abdomen Pelvis With Contrast (xpd)    X-Ray Chest PA And Lateral (Completed)    Neuropathy associated with cancer        Relevant Orders    CBC W/ AUTO DIFFERENTIAL (Completed)    Other diabetic neurological complication associated with type 2 diabetes mellitus        Relevant Orders    EMG W/  ULTRASOUND AND NERVE CONDUCTION TEST 2 Extremities    Localized swelling of left lower extremity        Relevant Orders    US Lower Extremity Veins Bilateral          Zachary Pizarro

## 2023-08-02 NOTE — PROGRESS NOTES
I have personally discussed  this patient and agree with the resident, and agree with  their note as stated above with the following thoughts:    Insulin is a consideration.  WIll increase GLP-!-- work on diet and follow up closely

## 2023-08-16 ENCOUNTER — CLINICAL SUPPORT (OUTPATIENT)
Dept: ENDOSCOPY | Facility: HOSPITAL | Age: 68
End: 2023-08-16
Payer: MEDICARE

## 2023-08-16 DIAGNOSIS — Z12.11 COLON CANCER SCREENING: ICD-10-CM

## 2023-08-16 NOTE — PLAN OF CARE
We attempted to reach  you   to schedule your procedure. We are sorry that we missed you. Please contact Endoscopy  Scheduling Department to reschedule another PAT or call us at [phone] for assistance. Thank you.

## 2023-09-08 ENCOUNTER — TELEPHONE (OUTPATIENT)
Dept: INTERNAL MEDICINE | Facility: CLINIC | Age: 68
End: 2023-09-08
Payer: MEDICARE

## 2023-09-21 ENCOUNTER — OFFICE VISIT (OUTPATIENT)
Dept: OPTOMETRY | Facility: CLINIC | Age: 68
End: 2023-09-21
Payer: MEDICARE

## 2023-09-21 ENCOUNTER — HOSPITAL ENCOUNTER (OUTPATIENT)
Dept: CARDIOLOGY | Facility: HOSPITAL | Age: 68
Discharge: HOME | End: 2023-09-21
Attending: INTERNAL MEDICINE
Payer: MEDICARE

## 2023-09-21 VITALS
HEIGHT: 67 IN | BODY MASS INDEX: 36.41 KG/M2 | DIASTOLIC BLOOD PRESSURE: 75 MMHG | SYSTOLIC BLOOD PRESSURE: 140 MMHG | HEART RATE: 70 BPM | WEIGHT: 232 LBS

## 2023-09-21 DIAGNOSIS — R52 GENERALIZED PAIN: ICD-10-CM

## 2023-09-21 DIAGNOSIS — E11.52 TYPE 2 DIABETES MELLITUS WITH DIABETIC PERIPHERAL ANGIOPATHY WITH GANGRENE, UNSPECIFIED WHETHER LONG TERM INSULIN USE: ICD-10-CM

## 2023-09-21 DIAGNOSIS — E11.9 TYPE 2 DIABETES MELLITUS WITHOUT RETINOPATHY: Primary | ICD-10-CM

## 2023-09-21 DIAGNOSIS — E11.36 TYPE 2 DIABETES MELLITUS WITH CATARACT: ICD-10-CM

## 2023-09-21 DIAGNOSIS — H52.4 HYPEROPIA WITH ASTIGMATISM AND PRESBYOPIA, BILATERAL: ICD-10-CM

## 2023-09-21 DIAGNOSIS — H52.03 HYPEROPIA WITH ASTIGMATISM AND PRESBYOPIA, BILATERAL: ICD-10-CM

## 2023-09-21 DIAGNOSIS — Z00.00 HEALTH MAINTENANCE EXAMINATION: ICD-10-CM

## 2023-09-21 DIAGNOSIS — H52.203 HYPEROPIA WITH ASTIGMATISM AND PRESBYOPIA, BILATERAL: ICD-10-CM

## 2023-09-21 LAB
ASCENDING AORTA: 2.63 CM
AV INDEX (PROSTH): 0.4
AV MEAN GRADIENT: 11 MMHG
AV PEAK GRADIENT: 20 MMHG
AV VALVE AREA BY VELOCITY RATIO: 1.55 CM²
AV VALVE AREA: 1.53 CM²
AV VELOCITY RATIO: 0.41
BSA FOR ECHO PROCEDURE: 2.23 M2
CV ECHO LV RWT: 0.48 CM
DOP CALC AO PEAK VEL: 2.21 M/S
DOP CALC AO VTI: 52.07 CM
DOP CALC LVOT AREA: 3.8 CM2
DOP CALC LVOT DIAMETER: 2.2 CM
DOP CALC LVOT PEAK VEL: 0.9 M/S
DOP CALC LVOT STROKE VOLUME: 79.79 CM3
DOP CALCLVOT PEAK VEL VTI: 21 CM
E WAVE DECELERATION TIME: 172.24 MSEC
E/A RATIO: 0.75
E/E' RATIO: 11.69 M/S
ECHO LV POSTERIOR WALL: 1.07 CM (ref 0.6–1.1)
FRACTIONAL SHORTENING: 28 % (ref 28–44)
INTERVENTRICULAR SEPTUM: 0.83 CM (ref 0.6–1.1)
LA MAJOR: 5.2 CM
LA MINOR: 5.13 CM
LA WIDTH: 4.6 CM
LEFT ATRIUM SIZE: 4.3 CM
LEFT ATRIUM VOLUME INDEX MOD: 18.6 ML/M2
LEFT ATRIUM VOLUME INDEX: 40.4 ML/M2
LEFT ATRIUM VOLUME MOD: 39.93 CM3
LEFT ATRIUM VOLUME: 86.84 CM3
LEFT INTERNAL DIMENSION IN SYSTOLE: 3.24 CM (ref 2.1–4)
LEFT VENTRICLE DIASTOLIC VOLUME INDEX: 42.9 ML/M2
LEFT VENTRICLE DIASTOLIC VOLUME: 92.24 ML
LEFT VENTRICLE MASS INDEX: 66 G/M2
LEFT VENTRICLE SYSTOLIC VOLUME INDEX: 19.6 ML/M2
LEFT VENTRICLE SYSTOLIC VOLUME: 42.08 ML
LEFT VENTRICULAR INTERNAL DIMENSION IN DIASTOLE: 4.5 CM (ref 3.5–6)
LEFT VENTRICULAR MASS: 142.89 G
LV LATERAL E/E' RATIO: 8.44 M/S
LV SEPTAL E/E' RATIO: 19 M/S
MV PEAK A VEL: 1.01 M/S
MV PEAK E VEL: 0.76 M/S
MV STENOSIS PRESSURE HALF TIME: 49.95 MS
MV VALVE AREA P 1/2 METHOD: 4.4 CM2
RA MAJOR: 4.53 CM
RA PRESSURE ESTIMATED: 3 MMHG
RA WIDTH: 3.67 CM
RIGHT VENTRICULAR END-DIASTOLIC DIMENSION: 3.86 CM
SINUS: 2.79 CM
STJ: 2.45 CM
TDI LATERAL: 0.09 M/S
TDI SEPTAL: 0.04 M/S
TDI: 0.07 M/S
TRICUSPID ANNULAR PLANE SYSTOLIC EXCURSION: 1.88 CM
Z-SCORE OF LEFT VENTRICULAR DIMENSION IN END DIASTOLE: -4.39
Z-SCORE OF LEFT VENTRICULAR DIMENSION IN END SYSTOLE: -2.15

## 2023-09-21 PROCEDURE — 93306 ECHO (CUPID ONLY): ICD-10-PCS | Mod: 26,,, | Performed by: INTERNAL MEDICINE

## 2023-09-21 PROCEDURE — 93306 TTE W/DOPPLER COMPLETE: CPT | Mod: 26,,, | Performed by: INTERNAL MEDICINE

## 2023-09-21 PROCEDURE — 2023F PR DILATED RETINAL EXAM W/O EVID OF RETINOPATHY: ICD-10-PCS | Mod: CPTII,S$GLB,, | Performed by: OPTOMETRIST

## 2023-09-21 PROCEDURE — 1157F ADVNC CARE PLAN IN RCRD: CPT | Mod: CPTII,S$GLB,, | Performed by: OPTOMETRIST

## 2023-09-21 PROCEDURE — 4010F PR ACE/ARB THEARPY RXD/TAKEN: ICD-10-PCS | Mod: CPTII,S$GLB,, | Performed by: OPTOMETRIST

## 2023-09-21 PROCEDURE — 92015 DETERMINE REFRACTIVE STATE: CPT | Mod: S$GLB,,, | Performed by: OPTOMETRIST

## 2023-09-21 PROCEDURE — 1157F PR ADVANCE CARE PLAN OR EQUIV PRESENT IN MEDICAL RECORD: ICD-10-PCS | Mod: CPTII,S$GLB,, | Performed by: OPTOMETRIST

## 2023-09-21 PROCEDURE — 99204 OFFICE O/P NEW MOD 45 MIN: CPT | Mod: S$GLB,,, | Performed by: OPTOMETRIST

## 2023-09-21 PROCEDURE — 99999 PR PBB SHADOW E&M-EST. PATIENT-LVL II: CPT | Mod: PBBFAC,,, | Performed by: OPTOMETRIST

## 2023-09-21 PROCEDURE — 99204 PR OFFICE/OUTPT VISIT, NEW, LEVL IV, 45-59 MIN: ICD-10-PCS | Mod: S$GLB,,, | Performed by: OPTOMETRIST

## 2023-09-21 PROCEDURE — 99999 PR PBB SHADOW E&M-EST. PATIENT-LVL II: ICD-10-PCS | Mod: PBBFAC,,, | Performed by: OPTOMETRIST

## 2023-09-21 PROCEDURE — 2023F DILAT RTA XM W/O RTNOPTHY: CPT | Mod: CPTII,S$GLB,, | Performed by: OPTOMETRIST

## 2023-09-21 PROCEDURE — 3046F PR MOST RECENT HEMOGLOBIN A1C LEVEL > 9.0%: ICD-10-PCS | Mod: CPTII,S$GLB,, | Performed by: OPTOMETRIST

## 2023-09-21 PROCEDURE — 3046F HEMOGLOBIN A1C LEVEL >9.0%: CPT | Mod: CPTII,S$GLB,, | Performed by: OPTOMETRIST

## 2023-09-21 PROCEDURE — 1159F MED LIST DOCD IN RCRD: CPT | Mod: CPTII,S$GLB,, | Performed by: OPTOMETRIST

## 2023-09-21 PROCEDURE — 93306 TTE W/DOPPLER COMPLETE: CPT

## 2023-09-21 PROCEDURE — 1159F PR MEDICATION LIST DOCUMENTED IN MEDICAL RECORD: ICD-10-PCS | Mod: CPTII,S$GLB,, | Performed by: OPTOMETRIST

## 2023-09-21 PROCEDURE — 4010F ACE/ARB THERAPY RXD/TAKEN: CPT | Mod: CPTII,S$GLB,, | Performed by: OPTOMETRIST

## 2023-09-21 PROCEDURE — 92015 PR REFRACTION: ICD-10-PCS | Mod: S$GLB,,, | Performed by: OPTOMETRIST

## 2023-09-21 NOTE — PROGRESS NOTES
HPI    Patient is here today for a diabetic eye exam. Patient's last eye exam was   3 years ago. Patient states he has not noticed any changes in his vision.   Patient states there are floaters in his vision OU. Patient states no   pain. He states occasionally his eyes are itchy. Patient is currently   wearing OTC +1.75 readers.    Eye Meds: None  Past Ocular Sx: None    Hemoglobin A1C       Date                     Value               Ref Range             Status                06/14/2023               11.9 (H)            4.0 - 5.6 %           Final              Comment:    ADA Screening Guidelines:  5.7-6.4%  Consistent with   prediabetes  >or=6.5%  Consistent with diabetes    High levels of fetal   hemoglobin interfere with the HbA1C  assay. Heterozygous hemoglobin   variants (HbS, HgC, etc)do  not significantly interfere with this assay.     However, presence of multiple variants may affect accuracy.         03/23/2023               13.1 (H)            4.0 - 5.6 %           Final              Comment:    ADA Screening Guidelines:  5.7-6.4%  Consistent with   prediabetes  >or=6.5%  Consistent with diabetes    High levels of fetal   hemoglobin interfere with the HbA1C  assay. Heterozygous hemoglobin   variants (HbS, HgC, etc)do  not significantly interfere with this assay.     However, presence of multiple variants may affect accuracy.         06/16/2022               10.6 (H)            4.0 - 5.6 %           Final              Comment:    ADA Screening Guidelines:  5.7-6.4%  Consistent with   prediabetes  >or=6.5%  Consistent with diabetes    High levels of fetal   hemoglobin interfere with the HbA1C  assay. Heterozygous hemoglobin   variants (HbS, HgC, etc)do  not significantly interfere with this assay.     However, presence of multiple variants may affect accuracy.    ----------   Last edited by Anahi Haji on 9/21/2023  1:30 PM.            Assessment /Plan     For exam results, see Encounter Report.    Type  2 diabetes mellitus without retinopathy    Health maintenance examination  -     Ambulatory referral/consult to Optometry    Type 2 diabetes mellitus with diabetic peripheral angiopathy with gangrene, unspecified whether long term insulin use    Type 2 diabetes mellitus with cataract    Hyperopia with astigmatism and presbyopia, bilateral        MONITOR. ED PT ON ALL EXAM FINDINGS  RX FINAL SPECS   OCULAR HEALTH WNL OD, OS   RTC 1 YR//PRN FOR REE/DFE

## 2023-10-05 ENCOUNTER — NURSE TRIAGE (OUTPATIENT)
Dept: ADMINISTRATIVE | Facility: CLINIC | Age: 68
End: 2023-10-05
Payer: MEDICARE

## 2023-10-05 ENCOUNTER — OFFICE VISIT (OUTPATIENT)
Dept: URGENT CARE | Facility: CLINIC | Age: 68
End: 2023-10-05
Payer: OTHER MISCELLANEOUS

## 2023-10-05 VITALS
TEMPERATURE: 98 F | OXYGEN SATURATION: 99 % | BODY MASS INDEX: 36.4 KG/M2 | WEIGHT: 231.94 LBS | SYSTOLIC BLOOD PRESSURE: 164 MMHG | RESPIRATION RATE: 16 BRPM | HEIGHT: 67 IN | DIASTOLIC BLOOD PRESSURE: 73 MMHG | HEART RATE: 71 BPM
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
## 2023-10-05 DIAGNOSIS — M19.131 SCAPHOLUNATE ADVANCED COLLAPSE OF RIGHT WRIST: ICD-10-CM

## 2023-10-05 PROCEDURE — 73080 X-RAY EXAM OF ELBOW: CPT | Mod: 26,RT,S$GLB, | Performed by: RADIOLOGY

## 2023-10-05 PROCEDURE — 73080 XR ELBOW COMPLETE 3 VIEW RIGHT: ICD-10-PCS | Mod: 26,RT,S$GLB, | Performed by: RADIOLOGY

## 2023-10-05 PROCEDURE — 73110 XR WRIST COMPLETE 3 VIEWS RIGHT: ICD-10-PCS | Mod: 26,RT,S$GLB, | Performed by: RADIOLOGY

## 2023-10-05 PROCEDURE — 73030 X-RAY EXAM OF SHOULDER: CPT | Mod: 26,RT,S$GLB, | Performed by: RADIOLOGY

## 2023-10-05 PROCEDURE — 99203 PR OFFICE/OUTPT VISIT, NEW, LEVL III, 30-44 MIN: ICD-10-PCS | Mod: S$GLB,,, | Performed by: FAMILY MEDICINE

## 2023-10-05 PROCEDURE — 73110 X-RAY EXAM OF WRIST: CPT | Mod: 26,RT,S$GLB, | Performed by: RADIOLOGY

## 2023-10-05 PROCEDURE — 99203 OFFICE O/P NEW LOW 30 MIN: CPT | Mod: S$GLB,,, | Performed by: FAMILY MEDICINE

## 2023-10-05 PROCEDURE — 73030 XR SHOULDER TRAUMA 3 VIEW RIGHT: ICD-10-PCS | Mod: 26,RT,S$GLB, | Performed by: RADIOLOGY

## 2023-10-05 NOTE — PROGRESS NOTES
Subjective:      Patient ID: Kilo Lam is a 67 y.o. male.    Chief Complaint: Fall    Patient's place of employment -  [employer]  Patient's job title -     Date of injury - 10/03/2023 @ 8:30 PM  Body part injured including left or right - Right Arm, Right Shoulder, Right Elbow, Right Hip, and Right Knee  Injury Mechanism -  Fall  What they were doing when they got hurt - Attempting to sit down in an office chair when chair rolled away  What they did immediately after - Return to bilateral feet from the floor  Pain scale right now - 4/10      Fall  The accident occurred 3 to 5 days ago. Fall occurred: from office chair. He fell from a height of 1 to 2 ft. He landed on Hard floor. There was no blood loss. The point of impact was the right hip, right knee, right wrist, right elbow and right shoulder. The pain is present in the right hand, right hip, right upper leg, right knee, right lower leg, right wrist, right lower arm, right elbow, right upper arm and right shoulder. The pain is at a severity of 4/10. The pain is moderate. The symptoms are aggravated by movement. Pertinent negatives include no abdominal pain, bowel incontinence, fever, headaches, hearing loss, hematuria, loss of consciousness, nausea, numbness, tingling, visual change or vomiting. He has tried acetaminophen for the symptoms. The treatment provided moderate relief.       Constitution: Negative for fever.   Gastrointestinal:  Negative for abdominal pain, nausea, vomiting and bowel incontinence.   Genitourinary:  Negative for hematuria.   Neurological:  Negative for headaches, loss of consciousness and numbness.     Objective:     Physical Exam  Constitutional:       General: He is not in acute distress.     Appearance: He is not ill-appearing.   Pulmonary:      Effort: No respiratory distress.   Musculoskeletal:      Comments: Limited ROM of the right wrist. Shoulder full strength and ROM as well as elbow.  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         Assessment:      1. Fall, initial encounter    2. Scapholunate advanced collapse of right wrist    Wrist Xray: Scapholunate ligamentous tear with proximal migration of the capitate (SLAC wrist)  Plan:     Follow up with occupational health.   Tylenol and/or motrin as needed.             No follow-ups on file.

## 2023-10-05 NOTE — TELEPHONE ENCOUNTER
C/o generalized body aches, hips, hands, feet ongoing. Has been seen for symptoms, has had test ran but has not discussed results.     Tuesday fell at work, landed on R side, R hips, R shoulder. -bruising. Still able to bear wait without difficulty. +generalized weakness. Denies hitting head. -loc.     Dispo-be seen in office now. No available apts at main campus or surrounding area. Discussed callback symptoms & UC in nearby location.     Reason for Disposition   MODERATE weakness (i.e., interferes with work, school, normal activities) and new-onset or worsening    Additional Information   Negative: Major injury from dangerous force (e.g., fall > 10 feet or 3 meters)   Negative: Major bleeding (e.g., actively dripping or spurting) and can't be stopped   Negative: Shock suspected (e.g., cold/pale/clammy skin, too weak to stand)   Negative: Difficult to awaken or acting confused (e.g., disoriented, slurred speech)   Negative: SEVERE weakness (i.e., unable to walk or barely able to walk, requires support) and new-onset or worsening   Negative: Can't stand (bear weight) or walk and new-onset after fall   Negative: Sounds like a life-threatening emergency to the triager   Negative: Injury (or injuries) that need emergency care   Negative: Sounds like a serious injury to the triager   Negative: Muscle pain and dark (cola colored) or red-colored urine   Negative: Unable to get up until help (e.g., caregiver, family, friend) arrived and on the ground 1 hour or more   Negative: Patient sounds very sick or weak to the triager    Protocols used: Falls and [id number]

## 2023-10-06 ENCOUNTER — OFFICE VISIT (OUTPATIENT)
Dept: URGENT CARE | Facility: CLINIC | Age: 68
End: 2023-10-06
Payer: OTHER MISCELLANEOUS

## 2023-10-06 VITALS
DIASTOLIC BLOOD PRESSURE: 78 MMHG | TEMPERATURE: 98 F | OXYGEN SATURATION: 98 % | HEART RATE: 76 BPM | SYSTOLIC BLOOD PRESSURE: 186 MMHG | BODY MASS INDEX: 36.34 KG/M2 | HEIGHT: 67 IN | WEIGHT: 231.5 LBS
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
## 2023-10-06 DIAGNOSIS — S63.511A SPRAIN OF CARPAL JOINT OF RIGHT WRIST, INITIAL ENCOUNTER: Primary | ICD-10-CM

## 2023-10-06 DIAGNOSIS — R93.7 ABNORMAL MUSCULOSKELETAL X-RAY: ICD-10-CM

## 2023-10-06 DIAGNOSIS — Z02.6 ENCOUNTER RELATED TO WORKER'S COMPENSATION CLAIM: ICD-10-CM

## 2023-10-06 PROCEDURE — 99203 PR OFFICE/OUTPT VISIT, NEW, LEVL III, 30-44 MIN: ICD-10-PCS | Mod: S$GLB,,, | Performed by: PHYSICIAN ASSISTANT

## 2023-10-06 PROCEDURE — 99203 OFFICE O/P NEW LOW 30 MIN: CPT | Mod: S$GLB,,, | Performed by: PHYSICIAN ASSISTANT

## 2023-10-06 NOTE — PROGRESS NOTES
Subjective:      Patient ID: Kilo Lam is a 67 y.o. male.    Chief Complaint: Wrist Injury and Arm Injury (Right wrist/arm)    KW  Patient's place of employment - [employer]  Patient's job title - SECURITY  Date of injury - 10-  Body part injured including left or right - RIGHT ARM/WRIST  Injury Mechanism - FALL  What they were doing when they got hurt - PATIENT STATES THAT HE FALL OUT OF A ROLLING CHAIR  What they did immediately after - KEPT WORKING  Pain scale right now - 4    67-year-old right-hand dominant male  presents with right wrist and hand injury that occurred yesterday when he attempted to sit down on a rolling chair and fell injuring the wrist and hand.  Patient also had right elbow and shoulder pain that improved.  Patient was seen at urgent care yesterday and had x-rays.  X-rays showed a scapholunate separation indicating ligamentous injury.  Right shoulder and elbow x-rays were without acute bony injuries.  Today patient complains of pain and swelling about the right wrist and hand. MEB    Wrist Injury   Associated symptoms include numbness.   Arm Injury   Associated symptoms include numbness.       Constitution: Positive for activity change.   HENT:  Negative for facial trauma.    Neck: Negative for neck pain.   Cardiovascular:  Negative for chest trauma.   Eyes:  Negative for eye trauma.   Respiratory:  Negative for shortness of breath.    Gastrointestinal:  Negative for abdominal trauma.   Musculoskeletal:  Positive for pain, trauma, joint pain, joint swelling and abnormal ROM of joint.   Skin:  Negative for wound and bruising.   Neurological:  Positive for numbness and tingling.     Objective:     Physical Exam  Vitals and nursing note reviewed.   Constitutional:       General: He is not in acute distress.     Appearance: He is well-developed. He is not diaphoretic.   HENT:      Head: Normocephalic and atraumatic.      Right Ear: Hearing and external ear normal.       Left Ear: Hearing and external ear normal.      Nose: Nose normal. No nasal deformity.   Eyes:      General: Lids are normal. No scleral icterus.     Conjunctiva/sclera: Conjunctivae normal.   Neck:      Trachea: Trachea normal.   Cardiovascular:      Pulses: Normal pulses.   Pulmonary:      Effort: Pulmonary effort is normal. No respiratory distress.      Breath sounds: No stridor.   Musculoskeletal:      Right wrist: Swelling and tenderness present. No deformity or lacerations. Decreased range of motion. Normal pulse.      Cervical back: Normal range of motion.   Skin:     General: Skin is warm and dry.      Capillary Refill: Capillary refill takes less than 2 seconds.   Neurological:      Mental Status: He is alert. He is not disoriented.      GCS: GCS eye subscore is 4. GCS verbal subscore is 5. GCS motor subscore is 6.      Sensory: No sensory deficit.   Psychiatric:         Attention and Perception: He is attentive.         Speech: Speech normal.         Behavior: Behavior normal.          XR ELBOW COMPLETE 3 VIEW RIGHT    Result Date: 10/5/2023  EXAMINATION: XR ELBOW COMPLETE 3 VIEW RIGHT CLINICAL HISTORY: . Unspecified fall, initial encounter TECHNIQUE: AP, lateral, and oblique views of the right elbow were performed. COMPARISON: None FINDINGS: Normal alignment.  No acute fracture seen.  Small osteophyte at the elbow joint.  Small osseous spur at the insertion of the triceps tendon onto the olecranon.  No acute fracture seen, no osseous lesion seen.  No joint effusion.  Extensive vascular calcifications seen.     Degenerative change, no acute fracture identified Electronically signed by: Shannon Koch MD Date:    10/05/2023 Time:    15:05    X-Ray Shoulder Trauma 3 view Right    Result Date: 10/5/2023  EXAMINATION: XR SHOULDER TRAUMA 3 VIEW RIGHT CLINICAL HISTORY: Unspecified fall, initial encounter TECHNIQUE: Three or four views of the right shoulder were performed. COMPARISON: None FINDINGS:  Mild joint space narrowing.  Minimal osteophyte at the inferior glenohumeral joint.  Preserved humeral head contour.  No cystic geodes seen. Minimal osteophyte at the acromioclavicular joint. No fracture, no osseous lesions. The soft tissues appear normal. The right upper thoracic region appears normal.     Degenerative change at the glenohumeral joint and acromioclavicular joint, no acute process seen Electronically signed by: Shannon Koch MD Date:    10/05/2023 Time:    15:04    X-Ray Wrist Complete 3 views Right    Result Date: 10/5/2023  EXAMINATION: XR WRIST COMPLETE 3 VIEWS RIGHT CLINICAL HISTORY: Unspecified fall, initial encounter TECHNIQUE: PA, lateral, and oblique views of the right wrist were performed. COMPARISON: None FINDINGS: The mineralization and alignment appear normal.  Abnormal increased scapholunate interval measuring near 7 mm consistent with scapholunate ligamentous tear with subtle proximal displacement of the capitate.  There is severe radiocarpal joint space narrowing.  Cystic geode at the radial aspect of the radius articular surface.  Severe joint space narrowing at the scaphotrapezium and 1st carpometacarpal joint.  No acute fracture seen, no osseous lesion seen.  Extensive vascular calcifications seen.  No radiopaque foreign body, no soft tissue air.     No definite acute fracture identified. Scapholunate ligamentous tear with proximal migration of the capitate (SLAC wrist) Severe degenerative change at the radiocarpal joint, scaphotrapezium joint and 1st carpometacarpal joint. Electronically signed by: Shannon Koch MD Date:    10/05/2023 Time:    15:02        Assessment:      1. Sprain of carpal joint of right wrist, initial encounter    2. Encounter related to worker's compensation claim    3. Abnormal musculoskeletal x-ray    4. Work related injury      Plan:     Patient with likely scapholunate ligament injury.  I will refer to hand specialist for evaluation and  treatment.  Patient given splint in clinic.  Advised to remove the splint occasionally throughout the day to conduct range of motion exercises.  Patient may take OTC Tylenol as needed for pain.  Discussed elevated blood pressure.  Advised to continue blood pressure medicine and monitor at home.  Follow up with primary care if blood pressure remains elevated.  Patient verbalized understanding.     Patient Instructions: Referral to specialist to be scheduled, once authorized, Use splint as directed (Apply ice 2-3 times daily for 30 minutes)   Restrictions: Discharged to Ortho/Neuro/Opthomologist/Surgeon (Avoid writing and other repetative movements with right hand.)  No follow-ups on file.

## 2023-10-06 NOTE — LETTER
Urgent Care - [address] [address]  Phone: [phone]  Fax: [phone]  [facility] [employer] Connect: [id number]    Pt Name: Kilo Lam  Injury Date: 10/03/2023   Employee ID: [id number] Date of First Treatment: 10/06/2023   Company: [employer]      Appointment Time: 01:30 PM Arrived: 1:50 PM   Provider: Santiago Ortiz PA-C Time Out: 2:28 PM     Office Treatment:   1. Sprain of carpal joint of right wrist, initial encounter    2. Encounter related to worker's compensation claim    3. Abnormal musculoskeletal x-ray    4. Work related injury          Patient Instructions: Referral to specialist to be scheduled, once authorized, Use splint as directed (Apply ice 2-3 times daily for 30 minutes)      Restrictions: Discharged to Ortho/Neuro/Opthomologist/Surgeon (Avoid writing and other repetative movements with right hand.)     Return Appointment: DISCHARGED TO ORTHO.    KSD

## 2023-10-09 ENCOUNTER — TELEPHONE (OUTPATIENT)
Dept: ORTHOPEDICS | Facility: CLINIC | Age: 68
End: 2023-10-09
Payer: MEDICARE

## 2023-10-10 ENCOUNTER — TELEPHONE (OUTPATIENT)
Dept: ORTHOPEDICS | Facility: CLINIC | Age: 68
End: 2023-10-10
Payer: MEDICARE

## 2023-10-11 ENCOUNTER — TELEPHONE (OUTPATIENT)
Dept: ORTHOPEDICS | Facility: CLINIC | Age: 68
End: 2023-10-11
Payer: MEDICARE

## 2023-10-11 NOTE — TELEPHONE ENCOUNTER
I called and spoke to the patient this morning regarding the message below. The patient did confirm that this is workman's comp. I provided the patient with the phone number for [facility] () to schedule his appointment.     The patient verbalized understanding and has no further questions.     ----- Message from Brynn Kennedy sent at 10/11/2023 10:02 AM CDT -----  Regarding: appt needed  Contact: pt @ [phone]  Caller: Mr. Lam   Reason for call: pt is trying to be scheduled from referral for S63.511A (ICD-10-CM) - Sprain of carpal joint of right wrist, initial encounter.  Please call to advise further as pt is needing to schedule workers comp appt. Thank you for all you are doing.

## 2023-10-13 ENCOUNTER — OFFICE VISIT (OUTPATIENT)
Dept: URGENT CARE | Facility: CLINIC | Age: 68
End: 2023-10-13
Payer: OTHER MISCELLANEOUS

## 2023-10-13 VITALS
SYSTOLIC BLOOD PRESSURE: 161 MMHG | RESPIRATION RATE: 19 BRPM | OXYGEN SATURATION: 96 % | HEART RATE: 72 BPM | BODY MASS INDEX: 36.34 KG/M2 | DIASTOLIC BLOOD PRESSURE: 71 MMHG | WEIGHT: 231.5 LBS | TEMPERATURE: 98 F | HEIGHT: 67 IN
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
## 2023-10-13 DIAGNOSIS — R93.7 ABNORMAL MUSCULOSKELETAL X-RAY: ICD-10-CM

## 2023-10-13 DIAGNOSIS — S63.511D SPRAIN OF CARPAL JOINT OF RIGHT WRIST, SUBSEQUENT ENCOUNTER: ICD-10-CM

## 2023-10-13 DIAGNOSIS — Z02.6 ENCOUNTER RELATED TO WORKER'S COMPENSATION CLAIM: ICD-10-CM

## 2023-10-13 DIAGNOSIS — M19.131 SLAC (SCAPHOLUNATE ADVANCED COLLAPSE) OF WRIST, RIGHT: Primary | ICD-10-CM

## 2023-10-13 PROCEDURE — 99213 OFFICE O/P EST LOW 20 MIN: CPT | Mod: S$GLB,,, | Performed by: PHYSICIAN ASSISTANT

## 2023-10-13 PROCEDURE — 99213 PR OFFICE/OUTPT VISIT, EST, LEVL III, 20-29 MIN: ICD-10-PCS | Mod: S$GLB,,, | Performed by: PHYSICIAN ASSISTANT

## 2023-10-13 NOTE — PROGRESS NOTES
Subjective:      Patient ID: Kilo Lam is a 67 y.o. male.    Chief Complaint: Hand Pain (R HAND )    Patient's place of employment - [employer]   Patient's job title -    Date of Injury -10/3/2023   Body part injured - R HAND   Current work status per last visit - EMPLOYER HAS NOT BEEN ALLOWED TO GO BACK TO WORK   Improved, same, or worse - IMPROVED   Pain Scale right now (1-10) -  4    KSD    Patient presents for unexpected visit due to work note. Patient requests a new note to get him back to work.  Says the original note was not explicit that he could return to work.  Because I asked, says the wrist is improving slightly.  Says the pain and swelling have improved.  He has not yet scheduled with Orthopedics.    Hand Pain   Pertinent negatives include no numbness.     Constitution: Positive for activity change.   Musculoskeletal:  Positive for pain, trauma, joint pain and joint swelling.   Skin:  Negative for wound and bruising.   Neurological:  Negative for numbness and tingling.     Objective:     Physical Exam  Vitals and nursing note reviewed.   Constitutional:       General: He is not in acute distress.     Appearance: He is well-developed. He is not diaphoretic.   HENT:      Head: Normocephalic and atraumatic.      Right Ear: Hearing and external ear normal.      Left Ear: Hearing and external ear normal.      Nose: Nose normal. No nasal deformity.   Eyes:      General: Lids are normal. No scleral icterus.     Conjunctiva/sclera: Conjunctivae normal.   Neck:      Trachea: Trachea normal.   Cardiovascular:      Pulses: Normal pulses.   Pulmonary:      Effort: Pulmonary effort is normal. No respiratory distress.      Breath sounds: No stridor.   Musculoskeletal:      Right wrist: Swelling and tenderness present. No deformity or lacerations. Decreased range of motion. Normal pulse.      Cervical back: Normal range of motion.   Skin:     General: Skin is warm and dry.       Capillary Refill: Capillary refill takes less than 2 seconds.   Neurological:      Mental Status: He is alert. He is not disoriented.      GCS: GCS eye subscore is 4. GCS verbal subscore is 5. GCS motor subscore is 6.      Sensory: No sensory deficit.   Psychiatric:         Attention and Perception: He is attentive.         Speech: Speech normal.         Behavior: Behavior normal.          XR ELBOW COMPLETE 3 VIEW RIGHT    Result Date: 10/5/2023  EXAMINATION: XR ELBOW COMPLETE 3 VIEW RIGHT CLINICAL HISTORY: . Unspecified fall, initial encounter TECHNIQUE: AP, lateral, and oblique views of the right elbow were performed. COMPARISON: None FINDINGS: Normal alignment.  No acute fracture seen.  Small osteophyte at the elbow joint.  Small osseous spur at the insertion of the triceps tendon onto the olecranon.  No acute fracture seen, no osseous lesion seen.  No joint effusion.  Extensive vascular calcifications seen.     Degenerative change, no acute fracture identified Electronically signed by: Shannon Koch MD Date:    10/05/2023 Time:    15:05    X-Ray Shoulder Trauma 3 view Right    Result Date: 10/5/2023  EXAMINATION: XR SHOULDER TRAUMA 3 VIEW RIGHT CLINICAL HISTORY: Unspecified fall, initial encounter TECHNIQUE: Three or four views of the right shoulder were performed. COMPARISON: None FINDINGS: Mild joint space narrowing.  Minimal osteophyte at the inferior glenohumeral joint.  Preserved humeral head contour.  No cystic geodes seen. Minimal osteophyte at the acromioclavicular joint. No fracture, no osseous lesions. The soft tissues appear normal. The right upper thoracic region appears normal.     Degenerative change at the glenohumeral joint and acromioclavicular joint, no acute process seen Electronically signed by: Shannon Koch MD Date:    10/05/2023 Time:    15:04    X-Ray Wrist Complete 3 views Right    Result Date: 10/5/2023  EXAMINATION: XR WRIST COMPLETE 3 VIEWS RIGHT CLINICAL HISTORY:  Unspecified fall, initial encounter TECHNIQUE: PA, lateral, and oblique views of the right wrist were performed. COMPARISON: None FINDINGS: The mineralization and alignment appear normal.  Abnormal increased scapholunate interval measuring near 7 mm consistent with scapholunate ligamentous tear with subtle proximal displacement of the capitate.  There is severe radiocarpal joint space narrowing.  Cystic geode at the radial aspect of the radius articular surface.  Severe joint space narrowing at the scaphotrapezium and 1st carpometacarpal joint.  No acute fracture seen, no osseous lesion seen.  Extensive vascular calcifications seen.  No radiopaque foreign body, no soft tissue air.     No definite acute fracture identified. Scapholunate ligamentous tear with proximal migration of the capitate (SLAC wrist) Severe degenerative change at the radiocarpal joint, scaphotrapezium joint and 1st carpometacarpal joint. Electronically signed by: Shannon Koch MD Date:    10/05/2023 Time:    15:02      Assessment:      1. Slac (scapholunate advanced collapse) of wrist, right    2. Encounter related to worker's compensation claim    3. Sprain of carpal joint of right wrist, subsequent encounter    4. Abnormal musculoskeletal x-ray    5. Work related injury      Plan:          Patient Instructions: Attention not to aggravate affected area, Use splint as directed, Referral to specialist to be scheduled, once authorized   Restrictions: Limited use of right hand and arm, Discharged to Ortho/Neuro/Opthomologist/Surgeon (Avoid writing and other repetative movements with right hand.)  No follow-ups on file.

## 2023-10-13 NOTE — LETTER
Urgent Care - [address] [address]  Phone: [phone]  Fax: [phone]  [facility] Connect: [id number]    Pt Name: Kilo Lam  Injury Date: 10/03/2023   Employee ID:  Date of First Treatment: 10/13/2023   Company: [employer] to [id number]      Appointment Time: 01:15 PM Arrived: 1:30 PM   Provider: Santiago Ortiz PA-C Time Out: 3:10 PM     Office Treatment:   1. Sprain of carpal joint of right wrist, subsequent encounter    2. Encounter related to worker's compensation claim    3. Abnormal musculoskeletal x-ray    4. Work related injury          Patient Instructions: Attention not to aggravate affected area, Use splint as directed, Referral to specialist to be scheduled, once authorized    Restrictions: Limited use of right hand and arm, Discharged to Ortho/Neuro/Opthomologist/Surgeon (Avoid writing and other repetative movements with right hand.)  Mr. Lam is cleared for duty with recommended restrictions above. He was to return to work with restrictions Monday 10/9/2023. Contact me with any questions at [phone].     Respectfully,        Santiago Ortiz PA-C

## 2023-10-17 ENCOUNTER — TELEPHONE (OUTPATIENT)
Dept: ORTHOPEDICS | Facility: CLINIC | Age: 68
End: 2023-10-17
Payer: MEDICARE

## 2023-10-17 NOTE — TELEPHONE ENCOUNTER
----- Message from Anastasia Canseco sent at 10/17/2023  8:59 AM CDT -----  Regarding: SAME DAY APPOINTMENT - WORKERS COMP  Contact: Self  Pt stated he need to be seen today for a right wrist injury. Pt has contact the worker comp dept and spoke to Noemy, however no one has contact as of yet. Pt has a referral in the system. Pt stated he need to be cleared to return to work. Pt ask for a call back as soon as possible today please      Contact info   [phone] (home)    We spoke  I gave him the Employee Connect Team number  [phone]   to give to his comp people to discuss our procedure -I explained that our comp office will call him with an appointment after all is approved   he was not happy to hear that....  it is our protocol

## 2023-10-23 ENCOUNTER — IMMUNIZATION (OUTPATIENT)
Dept: INTERNAL MEDICINE | Facility: CLINIC | Age: 68
End: 2023-10-23
Payer: MEDICARE

## 2023-10-23 ENCOUNTER — OFFICE VISIT (OUTPATIENT)
Dept: INTERNAL MEDICINE | Facility: CLINIC | Age: 68
End: 2023-10-23
Payer: MEDICARE

## 2023-10-23 VITALS
SYSTOLIC BLOOD PRESSURE: 138 MMHG | HEIGHT: 67 IN | BODY MASS INDEX: 35.84 KG/M2 | WEIGHT: 228.38 LBS | OXYGEN SATURATION: 95 % | DIASTOLIC BLOOD PRESSURE: 70 MMHG | HEART RATE: 79 BPM

## 2023-10-23 DIAGNOSIS — Z23 NEEDS FLU SHOT: Primary | ICD-10-CM

## 2023-10-23 DIAGNOSIS — E11.40 TYPE 2 DIABETES MELLITUS WITH DIABETIC NEUROPATHY, WITHOUT LONG-TERM CURRENT USE OF INSULIN: Primary | ICD-10-CM

## 2023-10-23 DIAGNOSIS — M25.542 ARTHRALGIA OF BOTH HANDS: ICD-10-CM

## 2023-10-23 DIAGNOSIS — M25.541 ARTHRALGIA OF BOTH HANDS: ICD-10-CM

## 2023-10-23 LAB
ALBUMIN/CREAT UR: 434 UG/MG (ref 0–30)
BACTERIA #/AREA URNS AUTO: ABNORMAL /HPF
BILIRUB UR QL STRIP: NEGATIVE
CLARITY UR REFRACT.AUTO: CLEAR
COLOR UR AUTO: YELLOW
CREAT UR-MCNC: 103 MG/DL (ref 23–375)
GLUCOSE UR QL STRIP: ABNORMAL
HGB UR QL STRIP: NEGATIVE
HYALINE CASTS UR QL AUTO: 0 /LPF
KETONES UR QL STRIP: NEGATIVE
LEUKOCYTE ESTERASE UR QL STRIP: NEGATIVE
MICROALBUMIN UR DL<=1MG/L-MCNC: 447 UG/ML
MICROSCOPIC COMMENT: ABNORMAL
NITRITE UR QL STRIP: NEGATIVE
PH UR STRIP: 5 [PH] (ref 5–8)
PROT UR QL STRIP: ABNORMAL
RBC #/AREA URNS AUTO: 1 /HPF (ref 0–4)
SP GR UR STRIP: 1.02 (ref 1–1.03)
SQUAMOUS #/AREA URNS AUTO: 1 /HPF
URN SPEC COLLECT METH UR: ABNORMAL
WBC #/AREA URNS AUTO: 8 /HPF (ref 0–5)
YEAST UR QL AUTO: ABNORMAL

## 2023-10-23 PROCEDURE — 3078F PR MOST RECENT DIASTOLIC BLOOD PRESSURE < 80 MM HG: ICD-10-PCS | Mod: CPTII,GC,S$GLB,

## 2023-10-23 PROCEDURE — 3078F DIAST BP <80 MM HG: CPT | Mod: CPTII,GC,S$GLB,

## 2023-10-23 PROCEDURE — 99999 PR PBB SHADOW E&M-EST. PATIENT-LVL III: ICD-10-PCS | Mod: PBBFAC,GC,,

## 2023-10-23 PROCEDURE — 4010F PR ACE/ARB THEARPY RXD/TAKEN: ICD-10-PCS | Mod: CPTII,GC,S$GLB,

## 2023-10-23 PROCEDURE — 3062F POS MACROALBUMINURIA REV: CPT | Mod: CPTII,GC,S$GLB,

## 2023-10-23 PROCEDURE — 3008F BODY MASS INDEX DOCD: CPT | Mod: CPTII,GC,S$GLB,

## 2023-10-23 PROCEDURE — 99999 PR PBB SHADOW E&M-EST. PATIENT-LVL III: CPT | Mod: PBBFAC,GC,,

## 2023-10-23 PROCEDURE — 3008F PR BODY MASS INDEX (BMI) DOCUMENTED: ICD-10-PCS | Mod: CPTII,GC,S$GLB,

## 2023-10-23 PROCEDURE — 1125F PR PAIN SEVERITY QUANTIFIED, PAIN PRESENT: ICD-10-PCS | Mod: CPTII,GC,S$GLB,

## 2023-10-23 PROCEDURE — 3046F HEMOGLOBIN A1C LEVEL >9.0%: CPT | Mod: CPTII,GC,S$GLB,

## 2023-10-23 PROCEDURE — 1157F ADVNC CARE PLAN IN RCRD: CPT | Mod: CPTII,GC,S$GLB,

## 2023-10-23 PROCEDURE — 3075F SYST BP GE 130 - 139MM HG: CPT | Mod: CPTII,GC,S$GLB,

## 2023-10-23 PROCEDURE — 90694 FLU VACCINE - QUADRIVALENT - ADJUVANTED: ICD-10-PCS | Mod: GC,S$GLB,,

## 2023-10-23 PROCEDURE — 1157F PR ADVANCE CARE PLAN OR EQUIV PRESENT IN MEDICAL RECORD: ICD-10-PCS | Mod: CPTII,GC,S$GLB,

## 2023-10-23 PROCEDURE — 1125F AMNT PAIN NOTED PAIN PRSNT: CPT | Mod: CPTII,GC,S$GLB,

## 2023-10-23 PROCEDURE — 3062F PR POS MACROALBUMINURIA RESULT DOCUMENTED/REVIEW: ICD-10-PCS | Mod: CPTII,GC,S$GLB,

## 2023-10-23 PROCEDURE — 99213 OFFICE O/P EST LOW 20 MIN: CPT | Mod: GC,S$GLB,,

## 2023-10-23 PROCEDURE — 3066F PR DOCUMENTATION OF TREATMENT FOR NEPHROPATHY: ICD-10-PCS | Mod: CPTII,GC,S$GLB,

## 2023-10-23 PROCEDURE — G0008 ADMIN INFLUENZA VIRUS VAC: HCPCS | Mod: GC,S$GLB,,

## 2023-10-23 PROCEDURE — 99213 PR OFFICE/OUTPT VISIT, EST, LEVL III, 20-29 MIN: ICD-10-PCS | Mod: GC,S$GLB,,

## 2023-10-23 PROCEDURE — 3288F FALL RISK ASSESSMENT DOCD: CPT | Mod: CPTII,GC,S$GLB,

## 2023-10-23 PROCEDURE — 90694 VACC AIIV4 NO PRSRV 0.5ML IM: CPT | Mod: GC,S$GLB,,

## 2023-10-23 PROCEDURE — 1101F PR PT FALLS ASSESS DOC 0-1 FALLS W/OUT INJ PAST YR: ICD-10-PCS | Mod: CPTII,GC,S$GLB,

## 2023-10-23 PROCEDURE — 1101F PT FALLS ASSESS-DOCD LE1/YR: CPT | Mod: CPTII,GC,S$GLB,

## 2023-10-23 PROCEDURE — G0008 FLU VACCINE - QUADRIVALENT - ADJUVANTED: ICD-10-PCS | Mod: GC,S$GLB,,

## 2023-10-23 PROCEDURE — 3046F PR MOST RECENT HEMOGLOBIN A1C LEVEL > 9.0%: ICD-10-PCS | Mod: CPTII,GC,S$GLB,

## 2023-10-23 PROCEDURE — 3066F NEPHROPATHY DOC TX: CPT | Mod: CPTII,GC,S$GLB,

## 2023-10-23 PROCEDURE — 81001 URINALYSIS AUTO W/SCOPE: CPT

## 2023-10-23 PROCEDURE — 3288F PR FALLS RISK ASSESSMENT DOCUMENTED: ICD-10-PCS | Mod: CPTII,GC,S$GLB,

## 2023-10-23 PROCEDURE — 3075F PR MOST RECENT SYSTOLIC BLOOD PRESS GE 130-139MM HG: ICD-10-PCS | Mod: CPTII,GC,S$GLB,

## 2023-10-23 PROCEDURE — 4010F ACE/ARB THERAPY RXD/TAKEN: CPT | Mod: CPTII,GC,S$GLB,

## 2023-10-23 PROCEDURE — 82043 UR ALBUMIN QUANTITATIVE: CPT

## 2023-10-23 RX ORDER — MELOXICAM 15 MG/1
15 TABLET ORAL DAILY
Qty: 21 TABLET | Refills: 0 | Status: SHIPPED | OUTPATIENT
Start: 2023-10-23 | End: 2023-11-13

## 2023-10-23 NOTE — PROGRESS NOTES
Subjective     Chief Complaint: Ongoing joint pain     History of Present Illness:  Mr. Kilo Lam is a 67 y.o. male here today with ongoing arthritis pain. Patient reports 6/10 pain in hands and feet. Recently seen after fall at work in clinic with x-rays taken. He is also seeing a hand surgeon tomorrow for further work-up and management. His joint pain in hands and feed are ongoing at 6/10 pain. He reports some improvement with 7.5mg mobic dose. He further has follow-up with rheumatology and neurology within the next month. States he has been consistent with taking his diabetes medicaiton however has not been checking blood sugars at home. Is interested in the flu shot today.     Review of Systems   Constitutional:  Negative for chills, diaphoresis, malaise/fatigue and weight loss.   Eyes: Negative.    Respiratory:  Negative for shortness of breath and wheezing.    Cardiovascular:  Negative for chest pain, palpitations and orthopnea.   Musculoskeletal:  Positive for back pain, falls, joint pain and myalgias.   Neurological: Negative.    Endo/Heme/Allergies: Negative.    Psychiatric/Behavioral:  Negative for depression. The patient is not nervous/anxious.        PAST HISTORY:     Past Medical History:   Diagnosis Date    Anxiety and depression 11/14/2018    Carpal tunnel syndrome, bilateral 4/9/2013    S/p Lt Carpal tunnel release 11/2011     DJD (degenerative joint disease) of lumbar spine 2/9/2015    Essential hypertension 11/6/2012    Fatty liver 11/26/2012    2012: US of ABD showed: Severe hepatic steatosis and  Hepatomegaly.    Mixed hyperlipidemia 12/5/2014    Morbid obesity 4/9/2013    BMI= 41.2 on 04/09/2013     Type 2 diabetes mellitus with diabetic neuropathy, without long-term current use of insulin 12/5/2014       Past Surgical History:   Procedure Laterality Date    CARPAL TUNNEL RELEASE Left 11/2011    no relief of hand symptoms    COLONOSCOPY  2004    normal, repeat in 7 yrs    COLONOSCOPY N/A  3/27/2019    Procedure: COLONOSCOPY;  Surgeon: Smooth German MD;  Location: [facility] ([address] [place] [address]);  Service: Endoscopy;  Laterality: N/A;    INCISIONAL HERNIA REPAIR Left 2012    inguinal    SHOULDER SURGERY Left        Family History   Problem Relation Age of Onset    Diabetes Mother     Diabetes Father     Heart failure Father     Cancer Daughter 14        Leukemia, passed    Cancer Other         niece, Lung cancer    Liver disease Neg Hx     Colon cancer Neg Hx        Social History     Tobacco Use    Smoking status: Former     Current packs/day: 0.00     Types: Cigarettes     Quit date: 2009     Years since quittin.7     Passive exposure: Past    Smokeless tobacco: Never   Substance Use Topics    Alcohol use: No    Drug use: No       MEDICATIONS & ALLERGIES:     Current Outpatient Medications on File Prior to Visit   Medication Sig    amLODIPine (NORVASC) 10 MG tablet Take 1 tablet (10 mg total) by mouth once daily.    aspirin (ECOTRIN) 81 MG EC tablet Take 81 mg by mouth once daily.    betamethasone valerate 0.1% (VALISONE) 0.1 % Oint Apply topically 2 (two) times daily.    blood sugar diagnostic Strp 1 strip by Misc.(Non-Drug; Combo Route) route before meals and at bedtime as needed. (Patient not taking: Reported on 2023)    blood-glucose meter kit To check BG 3 times daily, to use with insurance preferred meter    blood-glucose sensor (DEXCOM G6 SENSOR) Lashonda 1 each by Misc.(Non-Drug; Combo Route) route every 10 days.    blood-glucose transmitter (DEXCOM G6 TRANSMITTER) Lashonda 1 Device by Misc.(Non-Drug; Combo Route) route every 3 (three) months.    cetirizine (ZYRTEC) 10 MG tablet Take 1 tablet (10 mg total) by mouth every evening. (Patient not taking: Reported on 2022)    dulaglutide (TRULICITY) 1.5 mg/0.5 mL pen injector Inject 1.5 mg into the skin every 7 days.    fluticasone (FLONASE) 50 mcg/actuation nasal spray 1 spray by Nasal route.    gabapentin (NEURONTIN) 300 MG capsule  "Take 1 capsule (300 mg total) by mouth 3 (three) times daily.    glipiZIDE (GLUCOTROL) 10 MG TR24 Take 1 tablet (10 mg total) by mouth daily with breakfast.    lancets (LANCETS,ULTRA THIN) Misc 1 application by Misc.(Non-Drug; Combo Route) route before meals and at bedtime as needed. (Patient not taking: Reported on 5/17/2023)    losartan (COZAAR) 100 MG tablet Take 1 tablet (100 mg total) by mouth once daily.    metFORMIN (GLUCOPHAGE) 1000 MG tablet Take 1 tablet (1,000 mg total) by mouth 2 (two) times daily with meals.    rosuvastatin (CRESTOR) 10 MG tablet Take 1 tablet (10 mg total) by mouth once daily.    spironolactone (ALDACTONE) 25 MG tablet Take 1 tablet (25 mg total) by mouth once daily.     No current facility-administered medications on file prior to visit.       Review of patient's allergies indicates:   Allergen Reactions    No known drug allergies        OBJECTIVE:     Vital Signs:  Vitals:    10/23/23 1522   BP: 138/70   BP Location: Left arm   Patient Position: Sitting   BP Method: Medium (Manual)   Pulse: 79   SpO2: 95%   Weight: 103.6 kg (228 lb 6.3 oz)   Height: 5' 7" (1.702 m)       Body mass index is 35.77 kg/m².     Physical Exam  Constitutional:       General: He is not in acute distress.     Appearance: He is not ill-appearing, toxic-appearing or diaphoretic.   HENT:      Head: Normocephalic and atraumatic.   Cardiovascular:      Rate and Rhythm: Normal rate and regular rhythm.      Heart sounds: Murmur heard.   Pulmonary:      Effort: Pulmonary effort is normal. No respiratory distress.      Breath sounds: Normal breath sounds.   Abdominal:      General: There is no distension.      Tenderness: There is no abdominal tenderness.   Musculoskeletal:         General: Swelling, tenderness and signs of injury (R hand) present.   Skin:     General: Skin is warm and dry.   Neurological:      General: No focal deficit present.      Mental Status: He is alert and oriented to person, place, and time. " Mental status is at baseline.   Psychiatric:         Mood and Affect: Mood normal.         Behavior: Behavior normal.       Health Maintenance Due   Topic Date Due    Shingles Vaccine (1 of 2) Never done    TETANUS VACCINE  11/11/2015    Abdominal Aortic Aneurysm Screening  12/20/2018    Influenza Vaccine (1) 09/01/2023    COVID-19 Vaccine (5 - 2023-24 season) 09/01/2023    Hemoglobin A1c  09/14/2023    Diabetes Urine Screening  11/09/2023         ASSESSMENT & PLAN:   Mr. Kilo Lam is a 67 y.o. male here today for ongoing joint pain. Has appointment with rheumatology in 2 weeks, will prescribe 15mg daily mobic for symptom relief until able to be seen in clinic. Will give flu shot as well. Patient due for repeat A1C, diabetes urine test, and BMP.   - New A1C, BMP, urinalysis labs/ratio, will uptitrate GLP-1 as indicated. Goal would be to d/c glipizide if able    - Mobic 15mg daily for inflammation related ongoing joint pain   - Flu shot today       Type 2 diabetes mellitus with diabetic neuropathy, without long-term current use of insulin  -     HEMOGLOBIN A1C; Future; Expected date: 10/23/2023  -     BASIC METABOLIC PANEL; Future; Expected date: 10/23/2023  -     Microalbumin/creatinine urine ratio  -     Urinalysis    Arthralgia of both hands  -     meloxicam (MOBIC) 15 MG tablet; Take 1 tablet (15 mg total) by mouth once daily. for 21 days  Dispense: 21 tablet; Refill: 0        RTC in 3 months or sooner PRN     Discussed with Dr. Solano - staff attestation to follow      Horacio Valentin DO, MSB   Internal Medicine, PGY-2  [facility]  [address] [place] [address] [id number] [phone]

## 2023-10-23 NOTE — PROGRESS NOTES
Two pt identifier verified. Pt tolerated well. Advised pt to wait 15 minutes post immunization. Pt verbalized understanding.    Obed SUMNER

## 2023-10-24 ENCOUNTER — OFFICE VISIT (OUTPATIENT)
Dept: ORTHOPEDICS | Facility: CLINIC | Age: 68
End: 2023-10-24

## 2023-10-24 ENCOUNTER — TELEPHONE (OUTPATIENT)
Dept: DERMATOLOGY | Facility: CLINIC | Age: 68
End: 2023-10-24
Payer: MEDICARE

## 2023-10-24 VITALS
HEIGHT: 67 IN | BODY MASS INDEX: 35.79 KG/M2 | HEART RATE: 71 BPM | WEIGHT: 228 LBS | SYSTOLIC BLOOD PRESSURE: 174 MMHG | DIASTOLIC BLOOD PRESSURE: 78 MMHG

## 2023-10-24 DIAGNOSIS — S63.501A SPRAIN OF RIGHT WRIST, INITIAL ENCOUNTER: Primary | ICD-10-CM

## 2023-10-24 PROCEDURE — 99204 OFFICE O/P NEW MOD 45 MIN: CPT | Mod: S$GLB,,, | Performed by: ORTHOPAEDIC SURGERY

## 2023-10-24 PROCEDURE — 99999 PR PBB SHADOW E&M-EST. PATIENT-LVL III: ICD-10-PCS | Mod: PBBFAC,,, | Performed by: ORTHOPAEDIC SURGERY

## 2023-10-24 PROCEDURE — 99999 PR PBB SHADOW E&M-EST. PATIENT-LVL III: CPT | Mod: PBBFAC,,, | Performed by: ORTHOPAEDIC SURGERY

## 2023-10-24 PROCEDURE — 99204 PR OFFICE/OUTPT VISIT, NEW, LEVL IV, 45-59 MIN: ICD-10-PCS | Mod: S$GLB,,, | Performed by: ORTHOPAEDIC SURGERY

## 2023-10-24 RX ORDER — MELOXICAM 15 MG/1
15 TABLET ORAL DAILY
Qty: 30 TABLET | Refills: 0 | Status: SHIPPED | OUTPATIENT
Start: 2023-10-24

## 2023-10-24 NOTE — PROGRESS NOTES
[facility]  History & Physical  Orthopedics    SUBJECTIVE:      Chief Complaint: right wrist pain    Referring Provider: N/A     History of Present Illness:  Patient is a 67 y.o. right hand dominant male with history of bilateral carpal tunnel syndrome s/p L [facility] ([place] in 2011) who presents today with complaints of right wrist pain after a ground level fall at work. He states that his pain has improved since the time of injury. He has been taking meloxicam for pain. The patient works as a/an .    Onset of symptoms/DOI was 10/5/23.    Symptoms are aggravated by activity and movement.    Symptoms are alleviated by rest and medication.    Symptoms consist of pain and decreased ROM.    The patient rates their pain as a 3/10.    Attempted treatment(s) and/or interventions include activity modifications, rest, rest, activity modification, anti-inflammatory medications, and elevation.     The patient denies any fevers, chills, N/V, D/C and presents for evaluation.       Past Medical History:   Diagnosis Date    Anxiety and depression 11/14/2018    Carpal tunnel syndrome, bilateral 4/9/2013    S/p Lt Carpal tunnel release 11/2011     DJD (degenerative joint disease) of lumbar spine 2/9/2015    Essential hypertension 11/6/2012    Fatty liver 11/26/2012    2012: US of ABD showed: Severe hepatic steatosis and  Hepatomegaly.    Mixed hyperlipidemia 12/5/2014    Morbid obesity 4/9/2013    BMI= 41.2 on 04/09/2013     Type 2 diabetes mellitus with diabetic neuropathy, without long-term current use of insulin 12/5/2014     Past Surgical History:   Procedure Laterality Date    CARPAL TUNNEL RELEASE Left 11/2011    no relief of hand symptoms    COLONOSCOPY  2004    normal, repeat in 7 yrs    COLONOSCOPY N/A 3/27/2019    Procedure: COLONOSCOPY;  Surgeon: mSooth German MD;  Location: [facility] ([address] [place] [address]);  Service: Endoscopy;  Laterality: N/A;    INCISIONAL HERNIA REPAIR Left 12/2012     inguinal    SHOULDER SURGERY Left 1972     Review of patient's allergies indicates:   Allergen Reactions    No known drug allergies      Social History     Social History Narrative    Retired . Now a .     to Alicia.  5 children.     Family History   Problem Relation Age of Onset    Diabetes Mother     Diabetes Father     Heart failure Father     Cancer Daughter 14        Leukemia, passed    Cancer Other         niece, Lung cancer    Liver disease Neg Hx     Colon cancer Neg Hx          Current Outpatient Medications:     amLODIPine (NORVASC) 10 MG tablet, Take 1 tablet (10 mg total) by mouth once daily., Disp: 90 tablet, Rfl: 3    aspirin (ECOTRIN) 81 MG EC tablet, Take 81 mg by mouth once daily., Disp: , Rfl:     betamethasone valerate 0.1% (VALISONE) 0.1 % Oint, Apply topically 2 (two) times daily., Disp: 30 g, Rfl: 3    blood sugar diagnostic Strp, 1 strip by Misc.(Non-Drug; Combo Route) route before meals and at bedtime as needed. (Patient not taking: Reported on 5/17/2023), Disp: 200 strip, Rfl: 4    blood-glucose meter kit, To check BG 3 times daily, to use with insurance preferred meter, Disp: 1 each, Rfl: 0    blood-glucose sensor (DEXCOM G6 SENSOR) [place]ashonda, 1 each by Misc.(Non-Drug; Combo Route) route every 10 days., Disp: 9 each, Rfl: 3    blood-glucose transmitter (DEXCOM G6 TRANSMITTER) [place]ashonda, 1 Device by Misc.(Non-Drug; Combo Route) route every 3 (three) months., Disp: 1 each, Rfl: 3    cetirizine (ZYRTEC) 10 MG tablet, Take 1 tablet (10 mg total) by mouth every evening. (Patient not taking: Reported on 8/26/2022), Disp: 30 tablet, Rfl: 0    dulaglutide (TRULICITY) 1.5 mg/0.5 mL pen injector, Inject 1.5 mg into the skin every 7 days., Disp: 4 pen , Rfl: 3    fluticasone (FLONASE) 50 mcg/actuation nasal spray, 1 spray by Nasal route., Disp: , Rfl:     gabapentin (NEURONTIN) 300 MG capsule, Take 1 capsule (300 mg total) by mouth 3 (three) times daily., Disp: 90 capsule,  "Rfl: 2    glipiZIDE (GLUCOTROL) 10 MG TR24, Take 1 tablet (10 mg total) by mouth daily with breakfast., Disp: 90 tablet, Rfl: 3    lancets (LANCETS,ULTRA THIN) Misc, 1 application by Misc.(Non-Drug; Combo Route) route before meals and at bedtime as needed. (Patient not taking: Reported on 5/17/2023), Disp: 200 each, Rfl: 4    losartan (COZAAR) 100 MG tablet, Take 1 tablet (100 mg total) by mouth once daily., Disp: 90 tablet, Rfl: 3    meloxicam (MOBIC) 15 MG tablet, Take 1 tablet (15 mg total) by mouth once daily. for 21 days, Disp: 21 tablet, Rfl: 0    metFORMIN (GLUCOPHAGE) 1000 MG tablet, Take 1 tablet (1,000 mg total) by mouth 2 (two) times daily with meals., Disp: 180 tablet, Rfl: 3    rosuvastatin (CRESTOR) 10 MG tablet, Take 1 tablet (10 mg total) by mouth once daily., Disp: 90 tablet, Rfl: 3    spironolactone (ALDACTONE) 25 MG tablet, Take 1 tablet (25 mg total) by mouth once daily., Disp: 90 tablet, Rfl: 3      Review of Systems:  As per HPI otherwise noncontributory    OBJECTIVE:      Vital Signs (Most Recent):  Vitals:    10/24/23 1532   BP: (!) 174/78   Pulse: 71   Weight: 103.4 kg (228 lb)   Height: 5' 7" (1.702 m)     Body mass index is 35.71 kg/m².      Physical Exam:  Constitutional: The patient appears well-developed and well-nourished. No distress.   Skin: No lesions appreciated  Head: Normocephalic and atraumatic.   Nose: Nose normal.   Ears: No deformities seen  Eyes: Conjunctivae and EOM are normal.   Neck: No tracheal deviation present.   Cardiovascular: Normal rate and intact distal pulses.    Pulmonary/Chest: Effort normal. No respiratory distress.   Abdominal: There is no guarding.   Neurological: The patient is alert.   Psychiatric: The patient has a normal mood and affect.     Right Hand/Wrist Examination:    Observation/Inspection:  Swelling  none    Deformity  none  Discoloration  none     Scars   none    Atrophy  none    HAND/WRIST EXAMINATION:  Finkelstein's Test   Neg  WHAT " Test    Neg  Snuff box tenderness   Neg  Merino's Test    Neg  Hook of Hamate Tenderness  Neg  CMC grind    Neg  Circumduction test   Neg    Neurovascular Exam:  Digits WWP, brisk CR < 3s throughout  NVI motor/LTS to M/R/U nerves, radial pulse 2+  Tinel's Test - Carpal Tunnel  Neg  Tinel's Test - Cubital Tunnel  Neg  Phalen's Test    Neg  Median Nerve Compression Test Neg    ROM hand full, painless    ROM wrist full, painless    ROM elbow full, painless    Abdomen not guarded  Respirations nonlabored  Perfusion intact    Diagnostic Results:     Imaging - I independently viewed the patient's imaging as well as the radiology report.  Xrays of the patient's right wrist demonstrate no evidence of any acute fractures or dislocations. 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      ASSESSMENT/PLAN:      67 y.o. yo male with right wrist injury sustained 10/5/23. His pain has mostly resolved at this point, and he is able to perform range of motion. There are chronic degenerative changes of right wrist, but no acute osseous abnormalities.     Patient may return to work with no restrictions.   Continue NSAID as needed for pain    Should the patient's symptoms worsen, persist, or fail to improve they should return for reevaluation and I would be happy to see them back anytime.

## 2023-10-24 NOTE — PROGRESS NOTES
I have personally taken the history and examined this patient. I agree with the resident's note as stated above.   Pt doing well, no pain, ready to RTW. Pt examined, it is a chronic issue in his wrist  RTW

## 2023-10-24 NOTE — TELEPHONE ENCOUNTER
----- Message from Kris Cormier MA sent at 10/23/2023  1:54 PM CDT -----  [phone] (Today,  1:35 PM)  Pt is calling to schedule a appt please call

## 2023-11-08 ENCOUNTER — TELEPHONE (OUTPATIENT)
Dept: RHEUMATOLOGY | Facility: CLINIC | Age: 68
End: 2023-11-08
Payer: MEDICARE

## 2023-11-08 NOTE — TELEPHONE ENCOUNTER
I returned the patient's call 4 times and then left a message letting him know that Dr Bravo was leaving soon. I made a note for the  to let the patient know that I will call him to reschedule

## 2023-12-13 ENCOUNTER — OFFICE VISIT (OUTPATIENT)
Dept: INTERNAL MEDICINE | Facility: CLINIC | Age: 68
End: 2023-12-13
Payer: MEDICARE

## 2023-12-13 ENCOUNTER — LAB VISIT (OUTPATIENT)
Dept: LAB | Facility: HOSPITAL | Age: 68
End: 2023-12-13
Payer: MEDICARE

## 2023-12-13 VITALS
SYSTOLIC BLOOD PRESSURE: 142 MMHG | DIASTOLIC BLOOD PRESSURE: 80 MMHG | HEART RATE: 68 BPM | HEIGHT: 67 IN | BODY MASS INDEX: 35.75 KG/M2 | WEIGHT: 227.75 LBS

## 2023-12-13 DIAGNOSIS — E11.40 TYPE 2 DIABETES MELLITUS WITH DIABETIC NEUROPATHY, WITHOUT LONG-TERM CURRENT USE OF INSULIN: Primary | ICD-10-CM

## 2023-12-13 DIAGNOSIS — E78.2 MIXED HYPERLIPIDEMIA: ICD-10-CM

## 2023-12-13 DIAGNOSIS — G62.9 NEUROPATHY: ICD-10-CM

## 2023-12-13 DIAGNOSIS — E66.01 CLASS 2 SEVERE OBESITY WITH SERIOUS COMORBIDITY AND BODY MASS INDEX (BMI) OF 35.0 TO 35.9 IN ADULT, UNSPECIFIED OBESITY TYPE: ICD-10-CM

## 2023-12-13 DIAGNOSIS — E11.40 TYPE 2 DIABETES MELLITUS WITH DIABETIC NEUROPATHY, WITHOUT LONG-TERM CURRENT USE OF INSULIN: ICD-10-CM

## 2023-12-13 DIAGNOSIS — M25.50 ARTHRALGIA OF MULTIPLE JOINTS: ICD-10-CM

## 2023-12-13 DIAGNOSIS — N47.1 PHIMOSIS OF PENIS: ICD-10-CM

## 2023-12-13 DIAGNOSIS — L29.9 PRURITUS: ICD-10-CM

## 2023-12-13 LAB
ANION GAP SERPL CALC-SCNC: 10 MMOL/L (ref 8–16)
BUN SERPL-MCNC: 16 MG/DL (ref 8–23)
CALCIUM SERPL-MCNC: 9.7 MG/DL (ref 8.7–10.5)
CHLORIDE SERPL-SCNC: 107 MMOL/L (ref 95–110)
CO2 SERPL-SCNC: 23 MMOL/L (ref 23–29)
CREAT SERPL-MCNC: 0.9 MG/DL (ref 0.5–1.4)
EST. GFR  (NO RACE VARIABLE): >60 ML/MIN/1.73 M^2
ESTIMATED AVG GLUCOSE: 252 MG/DL (ref 68–131)
GLUCOSE SERPL-MCNC: 173 MG/DL (ref 70–110)
HBA1C MFR BLD: 10.4 % (ref 4–5.6)
POTASSIUM SERPL-SCNC: 4.6 MMOL/L (ref 3.5–5.1)
SODIUM SERPL-SCNC: 140 MMOL/L (ref 136–145)

## 2023-12-13 PROCEDURE — 4010F ACE/ARB THERAPY RXD/TAKEN: CPT | Mod: CPTII,GC,S$GLB,

## 2023-12-13 PROCEDURE — 3079F DIAST BP 80-89 MM HG: CPT | Mod: CPTII,GC,S$GLB,

## 2023-12-13 PROCEDURE — 3077F SYST BP >= 140 MM HG: CPT | Mod: CPTII,GC,S$GLB,

## 2023-12-13 PROCEDURE — 3008F BODY MASS INDEX DOCD: CPT | Mod: CPTII,GC,S$GLB,

## 2023-12-13 PROCEDURE — 36415 COLL VENOUS BLD VENIPUNCTURE: CPT

## 2023-12-13 PROCEDURE — 99999 PR PBB SHADOW E&M-EST. PATIENT-LVL IV: CPT | Mod: PBBFAC,GC,,

## 2023-12-13 PROCEDURE — 4010F PR ACE/ARB THEARPY RXD/TAKEN: ICD-10-PCS | Mod: CPTII,GC,S$GLB,

## 2023-12-13 PROCEDURE — 3008F PR BODY MASS INDEX (BMI) DOCUMENTED: ICD-10-PCS | Mod: CPTII,GC,S$GLB,

## 2023-12-13 PROCEDURE — 1101F PR PT FALLS ASSESS DOC 0-1 FALLS W/OUT INJ PAST YR: ICD-10-PCS | Mod: CPTII,GC,S$GLB,

## 2023-12-13 PROCEDURE — 3046F HEMOGLOBIN A1C LEVEL >9.0%: CPT | Mod: CPTII,GC,S$GLB,

## 2023-12-13 PROCEDURE — 3066F NEPHROPATHY DOC TX: CPT | Mod: CPTII,GC,S$GLB,

## 2023-12-13 PROCEDURE — 1125F PR PAIN SEVERITY QUANTIFIED, PAIN PRESENT: ICD-10-PCS | Mod: CPTII,GC,S$GLB,

## 2023-12-13 PROCEDURE — 3077F PR MOST RECENT SYSTOLIC BLOOD PRESSURE >= 140 MM HG: ICD-10-PCS | Mod: CPTII,GC,S$GLB,

## 2023-12-13 PROCEDURE — 3288F FALL RISK ASSESSMENT DOCD: CPT | Mod: CPTII,GC,S$GLB,

## 2023-12-13 PROCEDURE — 1101F PT FALLS ASSESS-DOCD LE1/YR: CPT | Mod: CPTII,GC,S$GLB,

## 2023-12-13 PROCEDURE — 1157F PR ADVANCE CARE PLAN OR EQUIV PRESENT IN MEDICAL RECORD: ICD-10-PCS | Mod: CPTII,GC,S$GLB,

## 2023-12-13 PROCEDURE — 3079F PR MOST RECENT DIASTOLIC BLOOD PRESSURE 80-89 MM HG: ICD-10-PCS | Mod: CPTII,GC,S$GLB,

## 2023-12-13 PROCEDURE — 80048 BASIC METABOLIC PNL TOTAL CA: CPT

## 2023-12-13 PROCEDURE — 3046F PR MOST RECENT HEMOGLOBIN A1C LEVEL > 9.0%: ICD-10-PCS | Mod: CPTII,GC,S$GLB,

## 2023-12-13 PROCEDURE — 3062F POS MACROALBUMINURIA REV: CPT | Mod: CPTII,GC,S$GLB,

## 2023-12-13 PROCEDURE — 3066F PR DOCUMENTATION OF TREATMENT FOR NEPHROPATHY: ICD-10-PCS | Mod: CPTII,GC,S$GLB,

## 2023-12-13 PROCEDURE — 99999 PR PBB SHADOW E&M-EST. PATIENT-LVL IV: ICD-10-PCS | Mod: PBBFAC,GC,,

## 2023-12-13 PROCEDURE — 3062F PR POS MACROALBUMINURIA RESULT DOCUMENTED/REVIEW: ICD-10-PCS | Mod: CPTII,GC,S$GLB,

## 2023-12-13 PROCEDURE — 1157F ADVNC CARE PLAN IN RCRD: CPT | Mod: CPTII,GC,S$GLB,

## 2023-12-13 PROCEDURE — 99213 PR OFFICE/OUTPT VISIT, EST, LEVL III, 20-29 MIN: ICD-10-PCS | Mod: GC,S$GLB,,

## 2023-12-13 PROCEDURE — 1125F AMNT PAIN NOTED PAIN PRSNT: CPT | Mod: CPTII,GC,S$GLB,

## 2023-12-13 PROCEDURE — 99213 OFFICE O/P EST LOW 20 MIN: CPT | Mod: GC,S$GLB,,

## 2023-12-13 PROCEDURE — 83036 HEMOGLOBIN GLYCOSYLATED A1C: CPT

## 2023-12-13 PROCEDURE — 3288F PR FALLS RISK ASSESSMENT DOCUMENTED: ICD-10-PCS | Mod: CPTII,GC,S$GLB,

## 2023-12-13 RX ORDER — DULAGLUTIDE 1.5 MG/.5ML
1.5 INJECTION, SOLUTION SUBCUTANEOUS
Qty: 4 PEN | Refills: 5 | Status: SHIPPED | OUTPATIENT
Start: 2023-12-13 | End: 2023-12-14

## 2023-12-13 RX ORDER — DULAGLUTIDE 1.5 MG/.5ML
INJECTION, SOLUTION SUBCUTANEOUS
COMMUNITY
Start: 2023-12-02 | End: 2023-12-13 | Stop reason: SDUPTHER

## 2023-12-13 NOTE — PROGRESS NOTES
Subjective     Chief Complaint: 3 Month Follow-up     History of Present Illness:  Mr. Kilo Lam is a 67 y.o. male with hx of T2DM (uncontrolled) here for follow-up. Last A1C 11.9 in 6/2023. Currently on Trulicity 1.5, metformin 1000mg, and Jardiance 10mg. Has new appointment with endocrine 1/2024. Currently sees Rheum regarding joint related diseases. Today patient reports increasing physical activity. Has been alternating tylenol and NSAIDs with moderate relief. Has been out of Trulicity for a couple weeks due to supply issue at pharmacy. Concerned regarding uro hx and interested in viagra or alternatives. Hx of phimosis noted with last visit 3/2023, where last note said possibility of circumcision being needed. Interested in getting plugged back in if it improves function. Currently urinating without issue and in no acute distress.     Review of Systems   Constitutional: Negative.  Negative for chills, diaphoresis and fever.   HENT:  Negative for congestion and sore throat.    Eyes:  Negative for blurred vision, photophobia, pain and discharge.   Respiratory:  Negative for cough, shortness of breath and wheezing.    Cardiovascular: Negative.  Negative for chest pain and palpitations.   Gastrointestinal:  Positive for diarrhea (not consistent, occasional loose). Negative for abdominal pain, constipation, heartburn, nausea and vomiting.   Musculoskeletal:  Positive for back pain and joint pain. Negative for myalgias.   Skin: Negative.  Negative for itching and rash.   Neurological:  Negative for dizziness, weakness and headaches.   Endo/Heme/Allergies:  Negative for environmental allergies. Does not bruise/bleed easily.       PAST HISTORY:     Past Medical History:   Diagnosis Date    Anxiety and depression 11/14/2018    Carpal tunnel syndrome, bilateral 4/9/2013    S/p Lt Carpal tunnel release 11/2011     DJD (degenerative joint disease) of lumbar spine 2/9/2015    Essential hypertension 11/6/2012    Fatty  SUBJECTIVE:  Kika Hobbs is a 59 y.o. male that presents with   Chief Complaint   Patient presents with    Wound Check   . HPI:      Patient's medications, allergies, past medical, surgical, social, and family histories were reviewed and updated as appropriate. He presents for follow-up on bilateral leg ulcerations. Bilateral Unna boots were removed and the skin ulcerations are healing well. The skin is intact with no drainage. He continues to have bilateral leg brawniness, redness, and tenderness. The Unna boots will not be reapplied. His blood sugars continue to run high. He has upcoming follow-up with his PCP next week. He denies any chest pain or worsening of shortness of air today. He frequently becomes nauseated and is requesting that his prescription of Phenergan be refilled today. Review of Systems   Constitutional: Positive for activity change and fatigue. HENT: Negative. Eyes: Negative. Respiratory: Positive for shortness of breath. Cardiovascular: Negative. Gastrointestinal: Positive for nausea. Endocrine: Negative. Genitourinary: Negative. Musculoskeletal: Positive for arthralgias, back pain, gait problem and myalgias. Skin: Positive for color change and rash. Allergic/Immunologic: Negative. Neurological: Positive for weakness. Hematological: Negative. Psychiatric/Behavioral: Negative. All other systems reviewed and are negative. OBJECTIVE:  Wt Readings from Last 2 Encounters:   11/27/18 (!) 411 lb (186.4 kg)   11/15/18 (!) 411 lb (186.4 kg)     BP Readings from Last 2 Encounters:   11/27/18 130/72   11/15/18 122/64      Pulse Readings from Last 2 Encounters:   11/27/18 78   11/15/18 74     Body mass index is 49.91 kg/m². @SMCDGDU5(6)@  Resp Readings from Last 2 Encounters:   11/27/18 20   11/15/18 20       Physical Exam   Constitutional: He is oriented to person, place, and time.  Vital signs are normal. He appears well-developed and liver 2012    2012: US of ABD showed: Severe hepatic steatosis and  Hepatomegaly.    Mixed hyperlipidemia 2014    Morbid obesity 2013    BMI= 41.2 on 2013     Type 2 diabetes mellitus with diabetic neuropathy, without long-term current use of insulin 2014       Past Surgical History:   Procedure Laterality Date    CARPAL TUNNEL RELEASE Left 2011    no relief of hand symptoms    COLONOSCOPY  2004    normal, repeat in 7 yrs    COLONOSCOPY N/A 3/27/2019    Procedure: COLONOSCOPY;  Surgeon: Smooth German MD;  Location: [facility] ([address] [place] [address]);  Service: Endoscopy;  Laterality: N/A;    INCISIONAL HERNIA REPAIR Left 2012    inguinal    SHOULDER SURGERY Left        Family History   Problem Relation Age of Onset    Diabetes Mother     Diabetes Father     Heart failure Father     Cancer Daughter 14        Leukemia, passed    Cancer Other         niece, Lung cancer    Liver disease Neg Hx     Colon cancer Neg Hx        Social History     Tobacco Use    Smoking status: Former     Current packs/day: 0.00     Types: Cigarettes     Quit date: 2009     Years since quittin.8     Passive exposure: Past    Smokeless tobacco: Never   Substance Use Topics    Alcohol use: No    Drug use: No       MEDICATIONS & ALLERGIES:     Current Outpatient Medications on File Prior to Visit   Medication Sig    amLODIPine (NORVASC) 10 MG tablet Take 1 tablet (10 mg total) by mouth once daily.    aspirin (ECOTRIN) 81 MG EC tablet Take 81 mg by mouth once daily.    betamethasone valerate 0.1% (VALISONE) 0.1 % Oint Apply topically 2 (two) times daily.    blood sugar diagnostic Strp 1 strip by Misc.(Non-Drug; Combo Route) route before meals and at bedtime as needed.    blood-glucose meter kit To check BG 3 times daily, to use with insurance preferred meter    blood-glucose sensor (DEXCOM G6 SENSOR) Lashonda 1 each by Misc.(Non-Drug; Combo Route) route every 10 days.    blood-glucose transmitter (DEXCOM G6  well-nourished. HENT:   Head: Normocephalic and atraumatic. Right Ear: Hearing and external ear normal.   Left Ear: Hearing and external ear normal.   Nose: Nose normal.   Mouth/Throat: Uvula is midline, oropharynx is clear and moist and mucous membranes are normal.   Eyes: Pupils are equal, round, and reactive to light. Conjunctivae, EOM and lids are normal.   Neck: Trachea normal, normal range of motion and phonation normal. Neck supple. Carotid bruit is not present. No thyroid mass and no thyromegaly present. Cardiovascular: Normal rate, regular rhythm, S1 normal, S2 normal, normal heart sounds and normal pulses. Exam reveals no gallop and no friction rub. No murmur heard. Pulmonary/Chest: Effort normal. He has decreased breath sounds. Musculoskeletal:        Right shoulder: He exhibits decreased range of motion, tenderness, pain, spasm and decreased strength. Left shoulder: He exhibits decreased range of motion and tenderness. Left elbow: Tenderness found. Right knee: He exhibits decreased range of motion. Tenderness found. Left knee: He exhibits decreased range of motion. Tenderness found. Lumbar back: He exhibits decreased range of motion, tenderness, pain and spasm. Right lower leg: He exhibits tenderness. Left lower leg: He exhibits tenderness. Neurological: He is alert and oriented to person, place, and time. GCS eye subscore is 4. GCS verbal subscore is 5. GCS motor subscore is 6. Skin: Skin is warm and dry. There is erythema. Bilateral lower extremity redness with healing small skin ulcerations on front tibial areas. Skin is intact with no drainage. He has some tenderness with edema below the knees. Psychiatric: He has a normal mood and affect. Nursing note and vitals reviewed.       Results in Past 30 Days  Result Component Current Result Ref Range Previous Result Ref Range   Alb 4.5 (11/15/2018) 3.4 - 4.8 g/dL Not in Time Range "TRANSMITTER) Lashonda 1 Device by Misc.(Non-Drug; Combo Route) route every 3 (three) months.    cetirizine (ZYRTEC) 10 MG tablet Take 1 tablet (10 mg total) by mouth every evening.    fluticasone (FLONASE) 50 mcg/actuation nasal spray 1 spray by Nasal route.    glipiZIDE (GLUCOTROL) 10 MG TR24 Take 1 tablet (10 mg total) by mouth daily with breakfast.    lancets (LANCETS,ULTRA THIN) Misc 1 application by Misc.(Non-Drug; Combo Route) route before meals and at bedtime as needed.    losartan (COZAAR) 100 MG tablet Take 1 tablet (100 mg total) by mouth once daily.    meloxicam (MOBIC) 15 MG tablet Take 1 tablet (15 mg total) by mouth once daily.    metFORMIN (GLUCOPHAGE) 1000 MG tablet Take 1 tablet (1,000 mg total) by mouth 2 (two) times daily with meals.    rosuvastatin (CRESTOR) 10 MG tablet Take 1 tablet (10 mg total) by mouth once daily.    spironolactone (ALDACTONE) 25 MG tablet Take 1 tablet (25 mg total) by mouth once daily.    [DISCONTINUED] TRULICITY 1.5 mg/0.5 mL pen injector Inject into the skin.    gabapentin (NEURONTIN) 300 MG capsule Take 1 capsule (300 mg total) by mouth 3 (three) times daily.    [DISCONTINUED] dulaglutide (TRULICITY) 1.5 mg/0.5 mL pen injector Inject 1.5 mg into the skin every 7 days.     No current facility-administered medications on file prior to visit.       Review of patient's allergies indicates:   Allergen Reactions    No known drug allergies        OBJECTIVE:     Vital Signs:  Vitals:    12/13/23 1357 12/13/23 1400   BP: 124/62 (!) 142/80   BP Location: Right arm Right arm   Patient Position: Sitting Sitting   BP Method: Large (Manual) Large (Manual)   Pulse: 68    Weight: 103.3 kg (227 lb 11.8 oz)    Height: 5' 7" (1.702 m)        Body mass index is 35.67 kg/m².     Physical Exam  Constitutional:       General: He is awake. He is not in acute distress.     Appearance: He is overweight. He is not ill-appearing, toxic-appearing or diaphoretic.   HENT:      Head: Normocephalic and " MG tablet; Take 1 tablet by mouth every 6 hours as needed for Nausea        Medications Discontinued During This Encounter   Medication Reason    promethazine (PHENERGAN) 25 MG tablet DUPLICATE       Return if symptoms worsen or fail to improve. PATIENT COUNSELING    Counseling was provided today regarding the following topics: Healthy eating habits, Regular exercise, substance abuse and healthy sleep habits. Discussed use, benefit, and side effects of prescribed medications. Barriers to medication compliance addressed. All patient questions answered. Ptvoiced understanding. Controlled Substances Monitoring: Attestation: The Prescription Monitoring Report for this patient was reviewed today. (Blu Lonoey, APRN)  Documentation: Possible medication side effects, risk of tolerance/dependence & alternative treatments discussed., No signs of potential drug abuse or diversion identified.  [facility] Natarajan, APRN) atraumatic.   Cardiovascular:      Rate and Rhythm: Normal rate and regular rhythm.      Pulses: Normal pulses.      Heart sounds: Normal heart sounds. No murmur heard.  Pulmonary:      Effort: Pulmonary effort is normal.      Breath sounds: Normal breath sounds. No wheezing.   Abdominal:      General: There is no distension.      Tenderness: There is no abdominal tenderness.   Musculoskeletal:         General: Tenderness present.   Skin:     General: Skin is warm and dry.   Neurological:      General: No focal deficit present.      Mental Status: He is alert, oriented to person, place, and time and easily aroused. Mental status is at baseline.   Psychiatric:         Mood and Affect: Mood normal.         Behavior: Behavior normal. Behavior is cooperative.       Health Maintenance Due   Topic Date Due    Shingles Vaccine (1 of 2) Never done    RSV Vaccine (Age 60+ and Pregnant patients) (1 - 1-dose 60+ series) Never done    TETANUS VACCINE  11/11/2015    Abdominal Aortic Aneurysm Screening  12/20/2018    Hemoglobin A1c  09/14/2023         ASSESSMENT & PLAN:   Mr. Kilo Lam is a 67 y.o. male here for follow-up.     Plan  - Last urine screen showed significant proteinuria though last renal function GFR >60, Cr 1.1. On ARB already.  Advised patient to complete previously ordered BMP and A1C. If concerns for worsening kidney function will refer to nephrology. Endocrine appointment within a month.   -  Refilled Trulicity to main pharmacy, has been out for many weeks due to supply shortage issue at his pharmacy   -  Therapy referral for joint PT   - Complete ordered BMP and A1c   - Advised to call and follow-up with rheum, missed last appointment   - Urology referral, previous patient   - Derm referral for ongoing pruritis of forehead   - Patient states having completed a AAA screen recently will attempt to confirm visit       Type 2 diabetes mellitus with diabetic neuropathy, without long-term current use of  insulin  -     dulaglutide (TRULICITY) 1.5 mg/0.5 mL pen injector; Inject 1.5 mg into the skin every 7 days.  Dispense: 4 pen ; Refill: 5    Mixed hyperlipidemia    Neuropathy  -     Ambulatory Referral/consult to [facility] - [facility] Therapy; Future; Expected date: 12/20/2023    Class 2 severe obesity with serious comorbidity and body mass index (BMI) of 35.0 to 35.9 in adult, unspecified obesity type    Phimosis of penis  -     Ambulatory referral/consult to Urology; Future; Expected date: 12/20/2023    Arthralgia of multiple joints  -     Ambulatory Referral/consult to [facility]; Future; Expected date: 12/20/2023    Pruritus  -     Ambulatory referral/consult to Dermatology; Future; Expected date: 12/20/2023        RTC in 3 months or sooner PRN     Discussed with Dr. Philippe Moon - staff attestation to follow      Horacio Valentin DO MSB   Internal Medicine, PGY-2  [facility]  [address] [place] [address] [id number] [phone]

## 2023-12-14 ENCOUNTER — TELEPHONE (OUTPATIENT)
Dept: INTERNAL MEDICINE | Facility: CLINIC | Age: 68
End: 2023-12-14
Payer: MEDICARE

## 2023-12-14 ENCOUNTER — OFFICE VISIT (OUTPATIENT)
Dept: DERMATOLOGY | Facility: CLINIC | Age: 68
End: 2023-12-14
Payer: MEDICARE

## 2023-12-14 DIAGNOSIS — L29.9 PRURITUS: Primary | ICD-10-CM

## 2023-12-14 DIAGNOSIS — E11.40 TYPE 2 DIABETES MELLITUS WITH DIABETIC NEUROPATHY, WITHOUT LONG-TERM CURRENT USE OF INSULIN: Primary | ICD-10-CM

## 2023-12-14 PROCEDURE — 1101F PT FALLS ASSESS-DOCD LE1/YR: CPT | Mod: CPTII,S$GLB,, | Performed by: DERMATOLOGY

## 2023-12-14 PROCEDURE — 3288F FALL RISK ASSESSMENT DOCD: CPT | Mod: CPTII,S$GLB,, | Performed by: DERMATOLOGY

## 2023-12-14 PROCEDURE — 99204 OFFICE O/P NEW MOD 45 MIN: CPT | Mod: S$GLB,,, | Performed by: DERMATOLOGY

## 2023-12-14 PROCEDURE — 1157F ADVNC CARE PLAN IN RCRD: CPT | Mod: CPTII,S$GLB,, | Performed by: DERMATOLOGY

## 2023-12-14 PROCEDURE — 3072F LOW RISK FOR RETINOPATHY: CPT | Mod: CPTII,S$GLB,, | Performed by: DERMATOLOGY

## 2023-12-14 PROCEDURE — 99999 PR PBB SHADOW E&M-EST. PATIENT-LVL III: CPT | Mod: PBBFAC,,, | Performed by: DERMATOLOGY

## 2023-12-14 PROCEDURE — 1159F MED LIST DOCD IN RCRD: CPT | Mod: CPTII,S$GLB,, | Performed by: DERMATOLOGY

## 2023-12-14 PROCEDURE — 1126F AMNT PAIN NOTED NONE PRSNT: CPT | Mod: CPTII,S$GLB,, | Performed by: DERMATOLOGY

## 2023-12-14 RX ORDER — HYDROXYZINE HYDROCHLORIDE 25 MG/1
25 TABLET, FILM COATED ORAL NIGHTLY
Qty: 30 TABLET | Refills: 2 | Status: SHIPPED | OUTPATIENT
Start: 2023-12-14

## 2023-12-14 RX ORDER — SEMAGLUTIDE 0.68 MG/ML
0.5 INJECTION, SOLUTION SUBCUTANEOUS
Qty: 3 ML | Refills: 2 | Status: SHIPPED | OUTPATIENT
Start: 2023-12-14 | End: 2024-03-13

## 2023-12-14 NOTE — TELEPHONE ENCOUNTER
----- Message from Horacio Valentin DO sent at 12/14/2023  3:55 PM CST -----  Regarding: Schedule Patient in Jan-February for follow-up  Please call patient to get scheduled on next clinic block in Jan-Feb. Thank you.        independent

## 2023-12-14 NOTE — PATIENT INSTRUCTIONS
- Start Compound prescription of Amitriptyline 2% Ketamine 1% cream disp 454g refill 2 , AAA TID x 1 week, then as needed, sent to [facility]  - Use cerave anti- itch cream as often a needed put in fridge. Ice will help with itch as well.   I am recommending NAC (N-acetyl cysteine) for you. You can purchase this at drug [employer] or [employer].   Instructions for takin mg daily for one week  then 600 mg twice daily for one week  then 1200 mg every morning and 600 mg at night for one week  then 1200 mg twice daily    Your prescription has been sent to [facility].  The phone number is [phone].  Their location is:  [facility]. [place], [place], [address] [place] [address] Pharmacy should call you. You can opt to pick the Rx up or have the Rx mailed to you.     Hours of operation:   Monday- Friday:  8 a.m. to 6:00 p.m.  Saturday:  8 a.m. to 1:00 p.m.  :  Closed

## 2023-12-14 NOTE — PROGRESS NOTES
Subjective:      Patient ID:  Kilo Lam is a 67 y.o. male who presents for   Chief Complaint   Patient presents with    Itching     Forehead and bilateral temples      Itching - Initial  Affected locations: forehead and bilateral temples.  Duration: 1 year (Waxes and wanes in intensity)  Signs / symptoms: itching and dryness (flakes)  Severity: mild to moderate  Timing: constant  Aggravated by: nothing  Relieving factors/Treatments tried: anti-itch cream  Improvement on treatment: Pt note olive oil provides moderate amount of relief.      Review of Systems   Constitutional:  Negative for fever, chills and fatigue.   Skin:  Positive for itching and dry skin. Negative for dry lips.       Objective:   Physical Exam   Constitutional: He appears well-developed and well-nourished. No distress.   Neurological: He is alert and oriented to person, place, and time. He is not disoriented.   Psychiatric: He has a normal mood and affect.   Skin:   Areas Examined (abnormalities noted in diagram):   Scalp / Hair Palpated and Inspected  Head / Face Inspection Performed            Diagram Legend     Erythematous scaling macule/papule c/w actinic keratosis       Vascular papule c/w angioma      Pigmented verrucoid papule/plaque c/w seborrheic keratosis      Yellow umbilicated papule c/w sebaceous hyperplasia      Irregularly shaped tan macule c/w lentigo     1-2 mm smooth white papules consistent with Milia      Movable subcutaneous cyst with punctum c/w epidermal inclusion cyst      Subcutaneous movable cyst c/w pilar cyst      Firm pink to brown papule c/w dermatofibroma      Pedunculated fleshy papule(s) c/w skin tag(s)      Evenly pigmented macule c/w junctional nevus     Mildly variegated pigmented, slightly irregular-bordered macule c/w mildly atypical nevus      Flesh colored to evenly pigmented papule c/w intradermal nevus       Pink pearly papule/plaque c/w basal cell carcinoma      Erythematous hyperkeratotic  cursted plaque c/w SCC      Surgical scar with no sign of skin cancer recurrence      Open and closed comedones      Inflammatory papules and pustules      Verrucoid papule consistent consistent with wart     Erythematous eczematous patches and plaques     Dystrophic onycholytic nail with subungual debris c/w onychomycosis     Umbilicated papule    Erythematous-base heme-crusted tan verrucoid plaque consistent with inflamed seborrheic keratosis     Erythematous Silvery Scaling Plaque c/w Psoriasis     See annotation      Assessment / Plan:        Pruritus  -     Ambulatory referral/consult to Dermatology  -     hydrOXYzine HCL (ATARAX) 25 MG tablet; Take 1 tablet (25 mg total) by mouth every evening. If too sedating okay to take 1/2 a pill  Dispense: 30 tablet; Refill: 2    - Start Compound prescription of Amitriptyline 2% Ketamine 1% cream disp 454g refill 2 , AAA TID x 1 week, then as needed, sent to [facility]  - Use cerave anti- itch cream as often a needed put in fridge. Ice will help with itch as well.   I am recommending NAC (N-acetyl cysteine) for you. You can purchase this at drug [employer] or [employer].   Instructions for takin mg daily for one week  then 600 mg twice daily for one week  then 1200 mg every morning and 600 mg at night for one week  then 1200 mg twice daily     Reviewed CBC, CMP labs within acceptable limits no liver or kidney issues          No follow-ups on file.

## 2023-12-14 NOTE — TELEPHONE ENCOUNTER
Received recent lab work with now new A1c of 10.4 Unfortunately, shortage of Trulicity throughout the region/potentially nationally. Concern for side effects if I switched to 3.0mg due to him being out of medication for the last several weeks. Will transition to ozempic 0.5mg once weekly for now. Will further add on Jardiance 10mg daily. Will have staff call and schedule patient for earlier appointment in late January as opposed to March to follow-up how he is doing on new medication regimen. He has endocrine appointment in mid January.

## 2024-01-10 ENCOUNTER — TELEPHONE (OUTPATIENT)
Dept: DIABETES | Facility: CLINIC | Age: 69
End: 2024-01-10
Payer: MEDICARE

## 2024-01-10 ENCOUNTER — OFFICE VISIT (OUTPATIENT)
Dept: ENDOCRINOLOGY | Facility: CLINIC | Age: 69
End: 2024-01-10
Payer: MEDICARE

## 2024-01-10 ENCOUNTER — OFFICE VISIT (OUTPATIENT)
Dept: UROLOGY | Facility: CLINIC | Age: 69
End: 2024-01-10
Payer: MEDICARE

## 2024-01-10 VITALS
HEART RATE: 92 BPM | DIASTOLIC BLOOD PRESSURE: 68 MMHG | WEIGHT: 233 LBS | BODY MASS INDEX: 36.57 KG/M2 | SYSTOLIC BLOOD PRESSURE: 152 MMHG | HEIGHT: 67 IN

## 2024-01-10 VITALS
HEIGHT: 67 IN | HEART RATE: 82 BPM | SYSTOLIC BLOOD PRESSURE: 166 MMHG | BODY MASS INDEX: 36.72 KG/M2 | WEIGHT: 233.94 LBS | OXYGEN SATURATION: 99 % | DIASTOLIC BLOOD PRESSURE: 76 MMHG

## 2024-01-10 DIAGNOSIS — N47.1 PHIMOSIS OF PENIS: ICD-10-CM

## 2024-01-10 DIAGNOSIS — E78.2 MIXED HYPERLIPIDEMIA: ICD-10-CM

## 2024-01-10 DIAGNOSIS — E66.01 CLASS 2 SEVERE OBESITY WITH SERIOUS COMORBIDITY AND BODY MASS INDEX (BMI) OF 35.0 TO 35.9 IN ADULT, UNSPECIFIED OBESITY TYPE: ICD-10-CM

## 2024-01-10 DIAGNOSIS — N47.1 PHIMOSIS: Primary | ICD-10-CM

## 2024-01-10 DIAGNOSIS — E11.40 TYPE 2 DIABETES MELLITUS WITH DIABETIC NEUROPATHY, WITHOUT LONG-TERM CURRENT USE OF INSULIN: Primary | ICD-10-CM

## 2024-01-10 PROCEDURE — 99999 PR PBB SHADOW E&M-EST. PATIENT-LVL V: CPT | Mod: PBBFAC,,, | Performed by: INTERNAL MEDICINE

## 2024-01-10 PROCEDURE — 99999 PR PBB SHADOW E&M-EST. PATIENT-LVL IV: CPT | Mod: PBBFAC,,, | Performed by: UROLOGY

## 2024-01-10 PROCEDURE — 99204 OFFICE O/P NEW MOD 45 MIN: CPT | Mod: S$GLB,,, | Performed by: INTERNAL MEDICINE

## 2024-01-10 PROCEDURE — 99213 OFFICE O/P EST LOW 20 MIN: CPT | Mod: S$GLB,,, | Performed by: UROLOGY

## 2024-01-10 RX ORDER — INSULIN GLARGINE 300 U/ML
15 INJECTION, SOLUTION SUBCUTANEOUS DAILY
Qty: 1.5 ML | Refills: 11 | Status: SHIPPED | OUTPATIENT
Start: 2024-01-10 | End: 2025-01-09

## 2024-01-10 RX ORDER — PEN NEEDLE, DIABETIC 30 GX3/16"
1 NEEDLE, DISPOSABLE MISCELLANEOUS DAILY
Qty: 100 EACH | Refills: 3 | Status: SHIPPED | OUTPATIENT
Start: 2024-01-10

## 2024-01-10 RX ORDER — BLOOD-GLUCOSE SENSOR
1 EACH MISCELLANEOUS
Qty: 3 EACH | Refills: 11 | Status: SHIPPED | OUTPATIENT
Start: 2024-01-10 | End: 2025-01-09

## 2024-01-10 NOTE — PROGRESS NOTES
Kilo Lam is a 68 y.o. male with HTN, HLD referred by Dr. Horacio Valentin for evaluation of T2DM    Arrived 35 minutes late for visit    History of Present Illness  T2DM  Diagnosed in >30 years ago  Known complications: DKD, PAD, NAFLD, neuropathy    Long-standing DM which has not been well controlled with A1c chronically above goal  Some recent issues getting GLP but even when on Trulicity not well controlled  Acknowledges that diet is an kristin    Current Diabetes Regimen:  Glipizide XR 10 mg daily  Metformin 1000 mg BID    Jardiance 10 mg daily- has not started as pharmacy did not have it  Ozempic 0.25 mg weekly- just sent, has not started    Prior mediations tried:  Trulicity- shortage and changed to ozempic    Diet/Exercise:  DM education many years ago  Diet has been a problem  Coffee   Black eyed peas and cabbage  5-6 cookies at midnight last night    Recent Hgb A1C:  Lab Results   Component Value Date    HGBA1C 10.4 (H) 12/13/2023       Glucose Monitoring:  Not checking    Hypoglycemic Episodes: denies    Screening / DM Complications:    Nephropathy:  ACEi/ARB: Taking  Lab Results   Component Value Date    MICALBCREAT 434.0 (H) 10/23/2023       Last Lipid Panel:  Statin: Taking  Lab Results   Component Value Date    LDLCALC 56.2 (L) 06/14/2023       Last foot exam : 07/31/2023  Last eye exam : 09/21/2023    B12:  Lab Results   Component Value Date    [id number] 409 09/30/2021       Aspirin: taking            Current Outpatient Medications:     amLODIPine (NORVASC) 10 MG tablet, Take 1 tablet (10 mg total) by mouth once daily., Disp: 90 tablet, Rfl: 3    aspirin (ECOTRIN) 81 MG EC tablet, Take 81 mg by mouth once daily., Disp: , Rfl:     betamethasone valerate 0.1% (VALISONE) 0.1 % Oint, Apply topically 2 (two) times daily., Disp: 30 g, Rfl: 3    fluticasone (FLONASE) 50 mcg/actuation nasal spray, 1 spray by Nasal route., Disp: , Rfl:     glipiZIDE (GLUCOTROL) 10 MG TR24, Take 1 tablet (10 mg total) by  mouth daily with breakfast., Disp: 90 tablet, Rfl: 3    hydrOXYzine HCL (ATARAX) 25 MG tablet, Take 1 tablet (25 mg total) by mouth every evening. If too sedating okay to take 1/2 a pill, Disp: 30 tablet, Rfl: 2    losartan (COZAAR) 100 MG tablet, Take 1 tablet (100 mg total) by mouth once daily., Disp: 90 tablet, Rfl: 3    meloxicam (MOBIC) 15 MG tablet, Take 1 tablet (15 mg total) by mouth once daily., Disp: 30 tablet, Rfl: 0    metFORMIN (GLUCOPHAGE) 1000 MG tablet, Take 1 tablet (1,000 mg total) by mouth 2 (two) times daily with meals., Disp: 180 tablet, Rfl: 3    rosuvastatin (CRESTOR) 10 MG tablet, Take 1 tablet (10 mg total) by mouth once daily., Disp: 90 tablet, Rfl: 3    spironolactone (ALDACTONE) 25 MG tablet, Take 1 tablet (25 mg total) by mouth once daily., Disp: 90 tablet, Rfl: 3    blood sugar diagnostic Strp, 1 strip by Misc.(Non-Drug; Combo Route) route before meals and at bedtime as needed. (Patient not taking: Reported on 1/10/2024), Disp: 200 strip, Rfl: 4    blood-glucose meter kit, To check BG 3 times daily, to use with insurance preferred meter, Disp: 1 each, Rfl: 0    blood-glucose sensor (DEXCOM G7 SENSOR) [place], 1 each by Misc.(Non-Drug; Combo Route) route every 10 days., Disp: 3 each, Rfl: 11    cetirizine (ZYRTEC) 10 MG tablet, Take 1 tablet (10 mg total) by mouth every evening., Disp: 30 tablet, Rfl: 0    empagliflozin (JARDIANCE) 10 mg tablet, Take 1 tablet (10 mg total) by mouth once daily. (Patient not taking: Reported on 1/10/2024), Disp: 30 tablet, Rfl: 2    gabapentin (NEURONTIN) 300 MG capsule, Take 1 capsule (300 mg total) by mouth 3 (three) times daily., Disp: 90 capsule, Rfl: 2    insulin glargine, TOUJEO, (TOUJEO SOLOSTAR U-300 INSULIN) 300 unit/mL (1.5 mL) InPn pen, Inject 15 Units into the skin once daily., Disp: 1.5 mL, Rfl: 11    lancets (LANCETS,ULTRA THIN) Misc, 1 application by Misc.(Non-Drug; Combo Route) route before meals and at bedtime as needed. (Patient not taking:  "Reported on 1/10/2024), Disp: 200 each, Rfl: 4    pen needle, diabetic (BD ULTRA-FINE KOBI PEN NEEDLE) 32 gauge x 5/32" Ndle, 1 each by Misc.(Non-Drug; Combo Route) route once daily., Disp: 100 each, Rfl: 3    semaglutide (OZEMPIC) 0.25 mg or 0.5 mg (2 mg/3 mL) pen injector, Inject 0.5 mg into the skin every 7 days. (Patient not taking: Reported on 1/10/2024), Disp: 3 mL, Rfl: 2    ROS as above    Objective:     Vitals:    01/10/24 0942   BP: (!) 166/76   Pulse: 82     Wt Readings from Last 3 Encounters:   01/10/24 0942 106.1 kg (233 lb 14.5 oz)   12/13/23 1357 103.3 kg (227 lb 11.8 oz)   10/24/23 1532 103.4 kg (228 lb)     Body mass index is 36.64 kg/m².  Physical Exam  Constitutional:       Appearance: He is well-developed.   HENT:      Head: Normocephalic.   Eyes:      Conjunctiva/sclera: Conjunctivae normal.   Pulmonary:      Effort: Pulmonary effort is normal.   Musculoskeletal:         General: Normal range of motion.   Skin:     General: Skin is warm.      Findings: No rash.   Neurological:      Mental Status: He is alert and oriented to person, place, and time.         Labs    Chemistry        Component Value Date/Time     12/13/2023 1448    K 4.6 12/13/2023 1448     12/13/2023 1448    CO2 23 12/13/2023 1448    BUN 16 12/13/2023 1448    CREATININE 0.9 12/13/2023 1448    CREATININE 0.9 05/08/2012 1725     (H) 12/13/2023 1448        Component Value Date/Time    CALCIUM 9.7 12/13/2023 1448    CALCIUM 10.0 05/08/2012 1725    ALKPHOS 64 07/26/2023 1244    ALKPHOS 60 05/08/2012 1725    AST 12 07/26/2023 1244    AST 15 05/08/2012 1725    ALT 14 07/26/2023 1244    BILITOT 0.3 07/26/2023 1244    ESTGFRAFRICA >60.0 06/16/2022 0812    EGFRNONAA >60.0 06/16/2022 0812        FIB-4 Calculation: 0.8 at 1/10/2024  9:40 AM     FIB-4 below 1.30 is considered as low-risk for advanced fibrosis  FIB-4 over 2.67 is considered as high-risk for advanced fibrosis  FIB-4 values between 1.30 and 2.67 are considered " as intermediate-risk of advanced fibrosis for ages 36-64.     For ages > 64 the cut-off for low-risk goes to < 2.  This is a screening tool and clinical judgement should be used in the interpretation of these results.        Assessment and Plan     Type 2 diabetes mellitus with diabetic neuropathy, without long-term current use of insulin  Long-standing DM which A1c above goal  Given A1c chronically above 10% recommend starting basal insulin  Will also benefit from use of CGM  Patient has diabetes mellitus and manages diabetes with intensive insulin regimen with one or more insulin injections daily or CSII. Patient requires a therapeutic CGM and is willing to use therapeutic CGM for the necessary frequent adjustments to insulin doses.     Goal in future to try to stop SFU if possible along with lowest dose possible of insulin  Also will benefit from dietary change and he is open to this    Patient Instructions   We will start daily insulin injection in addition to the diabetes medications as below  Start Toujeo 15 units once daily. You will see diabetes education to learn how to use this    Insulin types  You are taking two types of insulin and each of them has unique properties.     Long acting insulin:  Toujeo is the long-acting insulin. It lasts about 24 hours after injection. You need to take it once a day at the same time. Skipping a meal does not usually affect the dose of lantus.     Insulin pens should be stored in the refrigerator until you open them but once the pen is opened it can remain out of the fridge for up to 28 days in a temperature controlled environment. It should not be left in a hot or cold car, in the sun or cold but otherwise it can stay out while you are using it     We will start Dexcom G7 for glucose monitoring. [employer] is a supply company who will call you about shipping this    Start ozempic 0.25 mg weekly. After 4 weeks you can increase to 0.5 mg dose  Start Jardiance 10 mg  daily  Continue metformin 1000 mg twice daily and glipizide daily          Class 2 severe obesity with serious comorbidity and body mass index (BMI) of 35.0 to 35.9 in adult  Start ozempic and jardiance as above  DM education for dietary education    Mixed hyperlipidemia  Cont statin        RTC 3 months         Mayela Coon MD

## 2024-01-10 NOTE — PATIENT INSTRUCTIONS
We will start daily insulin injection in addition to the diabetes medications as below  Start Toujeo 15 units once daily. You will see diabetes education to learn how to use this    Insulin types  You are taking two types of insulin and each of them has unique properties.     Long acting insulin:  Toujeo is the long-acting insulin. It lasts about 24 hours after injection. You need to take it once a day at the same time. Skipping a meal does not usually affect the dose of lantus.     Insulin pens should be stored in the refrigerator until you open them but once the pen is opened it can remain out of the fridge for up to 28 days in a temperature controlled environment. It should not be left in a hot or cold car, in the sun or cold but otherwise it can stay out while you are using it     We will start Dexcom G7 for glucose monitoring. [employer] is a supply company who will call you about shipping this    Start ozempic 0.25 mg weekly. After 4 weeks you can increase to 0.5 mg dose  Start Jardiance 10 mg daily  Continue metformin 1000 mg twice daily and glipizide daily

## 2024-01-10 NOTE — ASSESSMENT & PLAN NOTE
Long-standing DM which A1c above goal  Given A1c chronically above 10% recommend starting basal insulin  Will also benefit from use of CGM  Patient has diabetes mellitus and manages diabetes with intensive insulin regimen with one or more insulin injections daily or CSII. Patient requires a therapeutic CGM and is willing to use therapeutic CGM for the necessary frequent adjustments to insulin doses.     Goal in future to try to stop SFU if possible along with lowest dose possible of insulin  Also will benefit from dietary change and he is open to this    Patient Instructions   We will start daily insulin injection in addition to the diabetes medications as below  Start Toujeo 15 units once daily. You will see diabetes education to learn how to use this    Insulin types  You are taking two types of insulin and each of them has unique properties.     Long acting insulin:  Toujeo is the long-acting insulin. It lasts about 24 hours after injection. You need to take it once a day at the same time. Skipping a meal does not usually affect the dose of lantus.     Insulin pens should be stored in the refrigerator until you open them but once the pen is opened it can remain out of the fridge for up to 28 days in a temperature controlled environment. It should not be left in a hot or cold car, in the sun or cold but otherwise it can stay out while you are using it     We will start Dexcom G7 for glucose monitoring. [employer] is a supply company who will call you about shipping this    Start ozempic 0.25 mg weekly. After 4 weeks you can increase to 0.5 mg dose  Start Jardiance 10 mg daily  Continue metformin 1000 mg twice daily and glipizide daily

## 2024-01-10 NOTE — PROGRESS NOTES
Subjective:       Patient ID: Kilo Lam is a 68 y.o. male.    Chief Complaint: phimosis (Pt here for phimosis related issues. )    HPI    Patient is here with phimosis he would like to have a circumcision he is  on aspirin he will need to have clearance I have asked him hold the aspirin and see his PCP about his diabetes    Past Medical History:   Diagnosis Date    Anxiety and depression 2018    Carpal tunnel syndrome, bilateral 2013    S/p Lt Carpal tunnel release 2011     DJD (degenerative joint disease) of lumbar spine 2015    Essential hypertension 2012    Fatty liver 2012    2012: US of ABD showed: Severe hepatic steatosis and  Hepatomegaly.    Mixed hyperlipidemia 2014    Morbid obesity 2013    BMI= 41.2 on 2013     Type 2 diabetes mellitus with diabetic neuropathy, without long-term current use of insulin 2014       Past Surgical History:   Procedure Laterality Date    CARPAL TUNNEL RELEASE Left 2011    no relief of hand symptoms    COLONOSCOPY  2004    normal, repeat in 7 yrs    COLONOSCOPY N/A 3/27/2019    Procedure: COLONOSCOPY;  Surgeon: Smooth German MD;  Location: [facility] ([address] [place] [address]);  Service: Endoscopy;  Laterality: N/A;    INCISIONAL HERNIA REPAIR Left 2012    inguinal    SHOULDER SURGERY Left        Family History   Problem Relation Age of Onset    Diabetes Mother     Diabetes Father     Heart failure Father     Cancer Daughter 14        Leukemia, passed    Cancer Other         niece, Lung cancer    Liver disease Neg Hx     Colon cancer Neg Hx        Social History     Socioeconomic History    Marital status:      Spouse name: Alicia    Number of children: 5   Tobacco Use    Smoking status: Former     Current packs/day: 0.00     Types: Cigarettes     Quit date: 2009     Years since quittin.9     Passive exposure: Past    Smokeless tobacco: Never   Substance and Sexual Activity    Alcohol use: No    Drug use: No     Sexual activity: Yes     Partners: Female   Social History Narrative    Retired . Now a .     to Alicia.  5 children.       Allergies:  No known drug allergies    Medications:    Current Outpatient Medications:     amLODIPine (NORVASC) 10 MG tablet, Take 1 tablet (10 mg total) by mouth once daily., Disp: 90 tablet, Rfl: 3    aspirin (ECOTRIN) 81 MG EC tablet, Take 81 mg by mouth once daily., Disp: , Rfl:     betamethasone valerate 0.1% (VALISONE) 0.1 % Oint, Apply topically 2 (two) times daily., Disp: 30 g, Rfl: 3    blood sugar diagnostic Strp, 1 strip by Misc.(Non-Drug; Combo Route) route before meals and at bedtime as needed. (Patient not taking: Reported on 1/10/2024), Disp: 200 strip, Rfl: 4    blood-glucose meter kit, To check BG 3 times daily, to use with insurance preferred meter, Disp: 1 each, Rfl: 0    blood-glucose sensor (DEXCOM G7 SENSOR) [place], 1 each by Misc.(Non-Drug; Combo Route) route every 10 days., Disp: 3 each, Rfl: 11    cetirizine (ZYRTEC) 10 MG tablet, Take 1 tablet (10 mg total) by mouth every evening., Disp: 30 tablet, Rfl: 0    empagliflozin (JARDIANCE) 10 mg tablet, Take 1 tablet (10 mg total) by mouth once daily. (Patient not taking: Reported on 1/10/2024), Disp: 30 tablet, Rfl: 2    fluticasone (FLONASE) 50 mcg/actuation nasal spray, 1 spray by Nasal route., Disp: , Rfl:     gabapentin (NEURONTIN) 300 MG capsule, Take 1 capsule (300 mg total) by mouth 3 (three) times daily., Disp: 90 capsule, Rfl: 2    glipiZIDE (GLUCOTROL) 10 MG TR24, Take 1 tablet (10 mg total) by mouth daily with breakfast., Disp: 90 tablet, Rfl: 3    hydrOXYzine HCL (ATARAX) 25 MG tablet, Take 1 tablet (25 mg total) by mouth every evening. If too sedating okay to take 1/2 a pill, Disp: 30 tablet, Rfl: 2    insulin glargine, TOUJEO, (TOUJEO SOLOSTAR U-300 INSULIN) 300 unit/mL (1.5 mL) InPn pen, Inject 15 Units into the skin once daily., Disp: 1.5 mL, Rfl: 11    lancets  "(LANCETS,ULTRA THIN) [facility] [place] 1 application by Misc.(Non-Drug; Combo Route) route before meals and at bedtime as needed. (Patient not taking: Reported on 1/10/2024), Disp: 200 each, Rfl: 4    losartan (COZAAR) 100 MG tablet, Take 1 tablet (100 mg total) by mouth once daily., Disp: 90 tablet, Rfl: 3    meloxicam (MOBIC) 15 MG tablet, Take 1 tablet (15 mg total) by mouth once daily., Disp: 30 tablet, Rfl: 0    metFORMIN (GLUCOPHAGE) 1000 MG tablet, Take 1 tablet (1,000 mg total) by mouth 2 (two) times daily with meals., Disp: 180 tablet, Rfl: 3    pen needle, diabetic (BD ULTRA-FINE KOBI PEN NEEDLE) 32 gauge x 5/32" Ndle, 1 each by Misc.(Non-Drug; Combo Route) route once daily., Disp: 100 each, Rfl: 3    rosuvastatin (CRESTOR) 10 MG tablet, Take 1 tablet (10 mg total) by mouth once daily., Disp: 90 tablet, Rfl: 3    semaglutide (OZEMPIC) 0.25 mg or 0.5 mg (2 mg/3 mL) pen injector, Inject 0.5 mg into the skin every 7 days. (Patient not taking: Reported on 1/10/2024), Disp: 3 mL, Rfl: 2    spironolactone (ALDACTONE) 25 MG tablet, Take 1 tablet (25 mg total) by mouth once daily., Disp: 90 tablet, Rfl: 3    Review of Systems    Objective:      Physical Exam    Assessment:       1. Phimosis    2. Phimosis of penis        Plan:       Kilo was seen today for phimosis.    Diagnoses and all orders for this visit:    Phimosis    Phimosis of penis  -     Ambulatory referral/consult to Urology     Clearance from PCP or a diabetes in holding aspirin        "

## 2024-01-11 ENCOUNTER — TELEPHONE (OUTPATIENT)
Dept: UROLOGY | Facility: CLINIC | Age: 69
End: 2024-01-11
Payer: MEDICARE

## 2024-01-17 ENCOUNTER — TELEPHONE (OUTPATIENT)
Dept: DIABETES | Facility: CLINIC | Age: 69
End: 2024-01-17

## 2024-02-16 ENCOUNTER — TELEPHONE (OUTPATIENT)
Dept: UROLOGY | Facility: CLINIC | Age: 69
End: 2024-02-16
Payer: MEDICARE

## 2024-02-20 ENCOUNTER — TELEPHONE (OUTPATIENT)
Dept: UROLOGY | Facility: CLINIC | Age: 69
End: 2024-02-20
Payer: MEDICARE

## 2024-02-20 NOTE — TELEPHONE ENCOUNTER
This my third attempt at calling the pt to get him scheduled for surgery. Each time I have called the pt I have left voicemail messages leaving my direct number for the pt to call me directly to get scheduled. However the pt keeps calling the scheduling line and they are sending a message to the nurse who in returns sends me the message but once I call the pt he doesn't answer.

## 2024-02-23 ENCOUNTER — TELEPHONE (OUTPATIENT)
Dept: UROLOGY | Facility: CLINIC | Age: 69
End: 2024-02-23
Payer: MEDICARE

## 2024-02-23 DIAGNOSIS — N47.1 PHIMOSIS: Primary | ICD-10-CM

## 2024-02-23 NOTE — TELEPHONE ENCOUNTER
Called and spoke to the pt to offer surgery date of 3/8, pt accepted. Informed the pt I will call the day before surgery with arrival time and pre-op instructions after 2pm. Pt verbalized understanding.

## 2024-02-28 ENCOUNTER — TELEPHONE (OUTPATIENT)
Dept: INTERNAL MEDICINE | Facility: CLINIC | Age: 69
End: 2024-02-28
Payer: MEDICARE

## 2024-02-28 NOTE — TELEPHONE ENCOUNTER
----- Message from Kojo Brenner RN sent at 2/28/2024 10:13 AM CST -----  Regarding: pcp clearance, aspirin preop inst  Dr. Rafi Velez is requesting PCP clearance and ASA 81mg preop instructions. Are you able to provide these?        Your patient indicated above requires Pre-Op Clearance/ Risk Stratification for a CIRCUMCISION  with Dr. MICHELLE NOGUERA on # (General anesthesia, 90 minutes).  Anesthesia review is in progress.    If a chart review is appropriate for clearance, please indicate in a note in the EMR.       If not, please advise timely, so that your clinic may schedule an appointment.  The following labs/tests have been ordered: CBC, CMP, EKG, PT/INR, PTT    If further diagnostics are required please feel free to initiate.       Thank you,  Kojo Brenner RN  [facility] [place]

## 2024-03-01 ENCOUNTER — OFFICE VISIT (OUTPATIENT)
Dept: INTERNAL MEDICINE | Facility: CLINIC | Age: 69
End: 2024-03-01
Payer: MEDICARE

## 2024-03-01 ENCOUNTER — TELEPHONE (OUTPATIENT)
Dept: PREADMISSION TESTING | Facility: HOSPITAL | Age: 69
End: 2024-03-01
Payer: MEDICARE

## 2024-03-01 VITALS
BODY MASS INDEX: 36.29 KG/M2 | OXYGEN SATURATION: 100 % | SYSTOLIC BLOOD PRESSURE: 164 MMHG | DIASTOLIC BLOOD PRESSURE: 72 MMHG | HEART RATE: 66 BPM | WEIGHT: 231.25 LBS | HEIGHT: 67 IN

## 2024-03-01 DIAGNOSIS — Z01.818 PREOP TESTING: Primary | ICD-10-CM

## 2024-03-01 PROCEDURE — 93010 ELECTROCARDIOGRAM REPORT: CPT | Mod: S$GLB,,, | Performed by: INTERNAL MEDICINE

## 2024-03-01 PROCEDURE — 99213 OFFICE O/P EST LOW 20 MIN: CPT | Mod: GC,S$GLB,,

## 2024-03-01 PROCEDURE — 99999 PR PBB SHADOW E&M-EST. PATIENT-LVL IV: CPT | Mod: PBBFAC,GC,,

## 2024-03-01 PROCEDURE — 93005 ELECTROCARDIOGRAM TRACING: CPT | Mod: S$GLB,,,

## 2024-03-01 NOTE — PROGRESS NOTES
Subjective     Chief Complaint:    History of Present Illness:  Mr. Kilo Lam is a 68 y.o. male who is a known case of T2DM, HTN, DLP, and no h/o COPD or CAD  Who presents today for a pre-op assessment for his upcoming circumcision.  Patient is doing well without any complaints.  He is being followed up by his PCP and trying to keep his BP and sugar under control.  Patient's last echo was in July 2023 that showed EF of 50-55% and G1 DD along with mild AS.  Patient does not complain of chest pain, chronic cough, COPD, seizures, syncope.  His RCRi is 0 points = 3.9%    Smoking: former smoker for ten years, quit 20 years ago  Alcohol: socially    In-office EKG was attempted but no razor available  He will have It done along with the labs on Monday. Surgery on Friday.    Review of Systems   Constitutional:  Negative for chills and fever.   Respiratory:  Negative for cough, sputum production, shortness of breath and wheezing.    Cardiovascular:  Negative for chest pain, palpitations, orthopnea, leg swelling and PND.   Genitourinary:  Negative for dysuria.   Musculoskeletal:  Negative for neck pain.   Neurological:  Negative for tremors, focal weakness, seizures, loss of consciousness, weakness and headaches.   Psychiatric/Behavioral:  Negative for hallucinations, substance abuse and suicidal ideas. The patient is not nervous/anxious.      PAST HISTORY:     Past Medical History:   Diagnosis Date    Anxiety and depression 11/14/2018    Carpal tunnel syndrome, bilateral 4/9/2013    S/p Lt Carpal tunnel release 11/2011     DJD (degenerative joint disease) of lumbar spine 2/9/2015    Essential hypertension 11/6/2012    Fatty liver 11/26/2012    2012: US of ABD showed: Severe hepatic steatosis and  Hepatomegaly.    Mixed hyperlipidemia 12/5/2014    Morbid obesity 4/9/2013    BMI= 41.2 on 04/09/2013     Type 2 diabetes mellitus with diabetic neuropathy, without long-term current use of insulin 12/5/2014       Past  Surgical History:   Procedure Laterality Date    CARPAL TUNNEL RELEASE Left 11/2011    no relief of hand symptoms    COLONOSCOPY  2004    normal, repeat in 7 yrs    COLONOSCOPY N/A 3/27/2019    Procedure: COLONOSCOPY;  Surgeon: Smooth German MD;  Location: [facility] ([address] [place] [address]);  Service: Endoscopy;  Laterality: N/A;    INCISIONAL HERNIA REPAIR Left 12/2012    inguinal    SHOULDER SURGERY Left 1972       Family History   Problem Relation Age of Onset    Diabetes Mother     Diabetes Father     Heart failure Father     Cancer Daughter 14        Leukemia, passed    Cancer Other         niece, Lung cancer    Liver disease Neg Hx     Colon cancer Neg Hx        Social History     Socioeconomic History    Marital status:      Spouse name: Alicia    Number of children: 5   Tobacco Use    Smoking status: Former     Current packs/day: 0.00     Types: Cigarettes     Quit date: 1/28/2009     Years since quitting: 15.0     Passive exposure: Past    Smokeless tobacco: Never   Substance and Sexual Activity    Alcohol use: No    Drug use: No    Sexual activity: Yes     Partners: Female   Social History Narrative    Retired . Now a .     to Alicia.  5 children.       MEDICATIONS & ALLERGIES:     Current Outpatient Medications on File Prior to Visit   Medication Sig    amLODIPine (NORVASC) 10 MG tablet Take 1 tablet (10 mg total) by mouth once daily.    aspirin (ECOTRIN) 81 MG EC tablet Take 81 mg by mouth once daily.    betamethasone valerate 0.1% (VALISONE) 0.1 % Oint Apply topically 2 (two) times daily.    blood sugar diagnostic Strp 1 strip by Misc.(Non-Drug; Combo Route) route before meals and at bedtime as needed.    blood-glucose meter kit To check BG 3 times daily, to use with insurance preferred meter    blood-glucose sensor (DEXCOM G7 SENSOR) [place] 1 each by Misc.(Non-Drug; Combo Route) route every 10 days.    cetirizine (ZYRTEC) 10 MG tablet Take 1 tablet (10 mg total) by  "mouth every evening.    empagliflozin (JARDIANCE) 10 mg tablet Take 1 tablet (10 mg total) by mouth once daily. (Patient not taking: Reported on 1/10/2024)    fluticasone (FLONASE) 50 mcg/actuation nasal spray 1 spray by Nasal route.    gabapentin (NEURONTIN) 300 MG capsule Take 1 capsule (300 mg total) by mouth 3 (three) times daily.    glipiZIDE (GLUCOTROL) 10 MG TR24 Take 1 tablet (10 mg total) by mouth daily with breakfast.    hydrOXYzine HCL (ATARAX) 25 MG tablet Take 1 tablet (25 mg total) by mouth every evening. If too sedating okay to take 1/2 a pill    insulin glargine, TOUJEO, (TOUJEO SOLOSTAR U-300 INSULIN) 300 unit/mL (1.5 mL) InPn pen Inject 15 Units into the skin once daily.    lancets (LANCETS,ULTRA THIN) Misc 1 application by Misc.(Non-Drug; Combo Route) route before meals and at bedtime as needed. (Patient not taking: Reported on 1/10/2024)    losartan (COZAAR) 100 MG tablet Take 1 tablet (100 mg total) by mouth once daily.    meloxicam (MOBIC) 15 MG tablet Take 1 tablet (15 mg total) by mouth once daily.    metFORMIN (GLUCOPHAGE) 1000 MG tablet Take 1 tablet (1,000 mg total) by mouth 2 (two) times daily with meals.    pen needle, diabetic (BD ULTRA-FINE KOBI PEN NEEDLE) 32 gauge x 5/32" Ndle 1 each by Misc.(Non-Drug; Combo Route) route once daily.    rosuvastatin (CRESTOR) 10 MG tablet Take 1 tablet (10 mg total) by mouth once daily.    semaglutide (OZEMPIC) 0.25 mg or 0.5 mg (2 mg/3 mL) pen injector Inject 0.5 mg into the skin every 7 days. (Patient not taking: Reported on 1/10/2024)    spironolactone (ALDACTONE) 25 MG tablet Take 1 tablet (25 mg total) by mouth once daily.     No current facility-administered medications on file prior to visit.       Review of patient's allergies indicates:   Allergen Reactions    No known drug allergies        OBJECTIVE:     Vital Signs:  Vitals:    03/01/24 1418   BP: (!) 164/72   Pulse: 66   SpO2: 100%   Weight: 104.9 kg (231 lb 4.2 oz)   Height: 5' 7" (1.702 " "m)       Body mass index is 36.22 kg/m².     Physical Exam:  General:  Well developed, well nourished, no acute distress  Head: Normocephalic, atraumatic  Eyes: PERRL, EOMI, clear sclera  Throat: No posterior pharyngeal erythema or exudate, no tonsillar exudate  Neck: supple, normal ROM, no thyromegaly   CVS:  RRR, S1 and S2 normal, no murmurs, rubs, gallops, 2+ peripheral pulses  Resp:  Lungs clear to auscultation, no wheezes, rales, rhonchi, cough  GI:  Abdomen soft, non-tender, non-distended, normoactive bowel sounds  MSK:  No muscle atrophy, cyanosis, peripheral edema   Skin:  No rashes, ulcers, erythema  Neuro:  CNII-XII grossly intact, no focal deficits noted  Psych:  Appropriate mood and affect, normal judgement    Laboratory  Lab Results   Component Value Date    WBC 7.28 07/26/2023    HGB 12.1 (L) 07/26/2023    HCT 41.5 07/26/2023    MCV 72 (L) 07/26/2023     07/26/2023     @[id number](GLU,NA,K,Cl,CO2,BUN,Creatinine,Calcium,MG)@  Lab Results   Component Value Date    INR 0.9 01/30/2013     Lab Results   Component Value Date    HGBA1C 10.4 (H) 12/13/2023     No results for input(s): "POCTGLUCOSE" in the last 72 hours.    Diagnostic Results:  Labs: Reviewed    ASSESSMENT & PLAN:   Mr. Kilo Lam is a 68 y.o. male who presents today for pre-op assessment. He will be undergoing a circumcision  Procedure this coming Friday. His RCRi is equivalent to 0 points = 3.9%  He is to get an ECG and blood labs drawn on Monday prior to his scheduled procedure on Friday.    Patient does not appear to have absolute contraindications to surgery and his RCRi qualifies him to undergo surgery at low risk of complications.              Case discussed with Dr Juan Vazquez MD  Internal Medicine PGY1  [facility]  [address] [place] [id number] [phone]      "

## 2024-03-01 NOTE — ANESTHESIA PAT ROS NOTE
03/01/2024  Kilo Lam is a 68 y.o., male.      Pre-op Assessment    I have reviewed the NPO Status.      Review of Systems  Anesthesia Hx:  No problems with previous Anesthesia   History of prior surgery of interest to airway management or planning:          Denies Family Hx of Anesthesia complications.    Denies Personal Hx of Anesthesia complications.                    Social:  Non-Smoker, Social Alcohol Use       Hematology/Oncology:  Hematology Normal   Oncology Normal                                   EENT/Dental:  EENT/Dental Normal           Cardiovascular:  Exercise tolerance: good   Denies Pacemaker. Hypertension, well controlled   Denies MI.  Denies CAD.     Denies Dysrhythmias.   Denies Angina.     hyperlipidemia     Functional Capacity good / => 4 METS      Hx of Heart Murmur        Peripheral Arterial Disease                   Pulmonary:    Denies COPD.  Denies Asthma.   Denies Shortness of breath.  Sleep Apnea (I have sleep apnea but I don't use cpap. I sleep on my side.)                Renal/:  Renal/ Normal                 Hepatic/GI:      Liver Disease,         Liver Disease, Fatty Liver     NAFLD   Musculoskeletal:         Spine Disorders: lumbar Degenerative disease           Neurological:  Denies TIA.  Denies CVA.    Denies Seizures.                              Neuromuscular Disease Pt h/o bilat CTS s/p left release in 2011  Endocrine:  Diabetes, poorly controlled, type 2, using insulin    Diabetes, Type 2 Diabetes     , most recent HgA1c value was 10.4 on 12/13/23.        Poorly controlled DM II with consistently high A1C results. Seeing endo for help getting better control. Last endo appt 01/10/24..       Obesity / BMI > 30  Psych:  Psychiatric History anxiety depression                     Anesthesia Assessment: Preoperative EQUATION    Planned Procedure: Procedure(s)  (LRB):  CIRCUMCISION (N/A)  Requested Anesthesia Type:General  Surgeon: Raúl Mckee Jr., MD  Service: Urology  Known or anticipated Date of Surgery:3/8/2024    Surgeon notes: reviewed    Electronic QUestionnaire Assessment completed via nurse interview with patient.        Triage considerations:     The patient has no apparent active cardiac condition (No unstable coronary Syndrome such as severe unstable angina or recent [<1 month] myocardial infarction, decompensated CHF, severe valvular   disease or significant arrhythmia)    Previous anesthesia records:GETA and No problems  11/27/2012 REPAIR, HERNIA, INGUINAL, WITHOUT HISTORY OF PRIOR REPAIR, AGE 5 YEARS OR OLDER (Left: Groin), general anesthesia, ASA 3  Airway/Jaw/Neck:  Airway Findings:  Mouth Opening: Normal  Tongue: Large  General Airway Assessment: Adult  Mallampati: III  Improves with phonation: II        Last PCP note: within 3 months , within [facility]   Subspecialty notes: Endocrinology, Urology    Other important co-morbidities: DM2, HLD, HTN, Obesity, CASEY, and anxiety, depression, bilateral carpal tunnel syndrome s/p left side release 2011, h/o left inguinal hernia repair 2012, DJD lumbar spine, NAFLD, PAD, phimosis       Tests already available:  Available tests,  within 3 months , 3-6 months ago , within [facility] .   12/13/23 A1C (10.4), BMP  09/21/23 TTE (EF 50-55%)            Instructions given. (See in Nurse's note)    Optimization:  Anesthesia Preop Clinic Assessment Indicated:     --phone screen done    Medical Opinion Indicated--pcp            Plan:    Testing:  EKG, Hematology Profile, PT/INR, and PTT     Consultation:Patient's PCP for a statement of optimization      Patient  has previously scheduled Medical Appointment:    Navigation: Tests Scheduled.              Consults scheduled.             Results will be tracked by Preop Clinic.

## 2024-03-04 ENCOUNTER — HOSPITAL ENCOUNTER (OUTPATIENT)
Dept: CARDIOLOGY | Facility: CLINIC | Age: 69
Discharge: HOME | End: 2024-03-04
Payer: MEDICARE

## 2024-03-04 ENCOUNTER — LAB VISIT (OUTPATIENT)
Dept: LAB | Facility: HOSPITAL | Age: 69
End: 2024-03-04
Attending: ANESTHESIOLOGY
Payer: MEDICARE

## 2024-03-04 DIAGNOSIS — K76.0 NAFLD (NONALCOHOLIC FATTY LIVER DISEASE): ICD-10-CM

## 2024-03-04 DIAGNOSIS — I15.2 HYPERTENSION ASSOCIATED WITH TYPE 2 DIABETES MELLITUS: ICD-10-CM

## 2024-03-04 DIAGNOSIS — Z01.818 PREOPERATIVE TESTING: ICD-10-CM

## 2024-03-04 DIAGNOSIS — E11.59 HYPERTENSION ASSOCIATED WITH TYPE 2 DIABETES MELLITUS: ICD-10-CM

## 2024-03-04 LAB
APTT PPP: 24.5 SEC (ref 21–32)
ERYTHROCYTE [DISTWIDTH] IN BLOOD BY AUTOMATED COUNT: 15.5 % (ref 11.5–14.5)
HCT VFR BLD AUTO: 40.8 % (ref 40–54)
HGB BLD-MCNC: 12.5 G/DL (ref 14–18)
INR PPP: 0.9 (ref 0.8–1.2)
MCH RBC QN AUTO: 21.5 PG (ref 27–31)
MCHC RBC AUTO-ENTMCNC: 30.6 G/DL (ref 32–36)
MCV RBC AUTO: 70 FL (ref 82–98)
OHS QRS DURATION: 92 MS
OHS QTC CALCULATION: 417 MS
PLATELET # BLD AUTO: 269 K/UL (ref 150–450)
PMV BLD AUTO: 12.7 FL (ref 9.2–12.9)
PROTHROMBIN TIME: 10 SEC (ref 9–12.5)
RBC # BLD AUTO: 5.82 M/UL (ref 4.6–6.2)
WBC # BLD AUTO: 7.83 K/UL (ref 3.9–12.7)

## 2024-03-04 PROCEDURE — 93010 ELECTROCARDIOGRAM REPORT: CPT | Mod: S$GLB,,, | Performed by: INTERNAL MEDICINE

## 2024-03-04 PROCEDURE — 85027 COMPLETE CBC AUTOMATED: CPT | Performed by: ANESTHESIOLOGY

## 2024-03-04 PROCEDURE — 36415 COLL VENOUS BLD VENIPUNCTURE: CPT | Performed by: ANESTHESIOLOGY

## 2024-03-04 PROCEDURE — 85730 THROMBOPLASTIN TIME PARTIAL: CPT | Performed by: ANESTHESIOLOGY

## 2024-03-04 PROCEDURE — 85610 PROTHROMBIN TIME: CPT | Performed by: ANESTHESIOLOGY

## 2024-03-04 PROCEDURE — 93005 ELECTROCARDIOGRAM TRACING: CPT | Mod: S$GLB,,, | Performed by: ANESTHESIOLOGY

## 2024-03-07 ENCOUNTER — PATIENT MESSAGE (OUTPATIENT)
Dept: UROLOGY | Facility: CLINIC | Age: 69
End: 2024-03-07
Payer: MEDICARE

## 2024-03-07 ENCOUNTER — TELEPHONE (OUTPATIENT)
Dept: UROLOGY | Facility: CLINIC | Age: 69
End: 2024-03-07
Payer: MEDICARE

## 2024-03-07 ENCOUNTER — PATIENT MESSAGE (OUTPATIENT)
Dept: INTERNAL MEDICINE | Facility: CLINIC | Age: 69
End: 2024-03-07
Payer: MEDICARE

## 2024-03-07 ENCOUNTER — TELEPHONE (OUTPATIENT)
Dept: INTERNAL MEDICINE | Facility: CLINIC | Age: 69
End: 2024-03-07
Payer: MEDICARE

## 2024-03-07 NOTE — TELEPHONE ENCOUNTER
----- Message from Gissel Mireles sent at 3/7/2024 11:56 AM CST -----  Type: General Call Back     Name of Caller:CHHAYA TEJADA [[id number]]  Symptoms:surgery   Would the patient rather a call back or a response via [facility]? Call back   Best Call Back Number: [phone]  Additional Information: pt indicates he would like to know if he's been cleared for his surgery tomorrow 03/07/24. Pt indicates he would like a call back as soon as possible. Please call back with further assistance and more information.

## 2024-03-07 NOTE — TELEPHONE ENCOUNTER
Called pt to confirm arrival time of 7am for procedure on 3/8/24. Gave pt NPO instructions and gave pt opportunity to ask questions. Pt verbalized understanding.

## 2024-03-07 NOTE — PROGRESS NOTES
Tried to call the patient as per his request   Because he asked whether or not he was cleared for surgery  He was already cleared since the last visit (please refer to last clinic encounter note)   ECG is not evident of issues that would hinder the surgery (no changes from last 2022 ECG)  Labs are acceptable

## 2024-03-07 NOTE — TELEPHONE ENCOUNTER
----- Message from Gissel Mireles sent at 3/7/2024 11:56 AM CST -----  Type: General Call Back     Name of Caller:CHHAYA TEJADA [[id number]]  Symptoms:surgery   Would the patient rather a call back or a response via [facility]? Call back   Best Call Back Number: [phone]  Additional Information: pt indicates he would like to know if he's been cleared for his surgery tomorrow 03/07/24. Pt indicates he would like a call back as soon as possible. Please call back with further assistance and more information.

## 2024-03-08 ENCOUNTER — ANESTHESIA (OUTPATIENT)
Dept: SURGERY | Facility: HOSPITAL | Age: 69
End: 2024-03-08
Payer: MEDICARE

## 2024-03-08 ENCOUNTER — HOSPITAL ENCOUNTER (OUTPATIENT)
Facility: HOSPITAL | Age: 69
Discharge: HOME | End: 2024-03-08
Attending: UROLOGY | Admitting: UROLOGY
Payer: MEDICARE

## 2024-03-08 ENCOUNTER — ANESTHESIA EVENT (OUTPATIENT)
Dept: SURGERY | Facility: HOSPITAL | Age: 69
End: 2024-03-08
Payer: MEDICARE

## 2024-03-08 VITALS
BODY MASS INDEX: 36.1 KG/M2 | DIASTOLIC BLOOD PRESSURE: 68 MMHG | HEIGHT: 67 IN | SYSTOLIC BLOOD PRESSURE: 157 MMHG | OXYGEN SATURATION: 94 % | TEMPERATURE: 98 F | RESPIRATION RATE: 17 BRPM | WEIGHT: 230 LBS | HEART RATE: 62 BPM

## 2024-03-08 DIAGNOSIS — I15.2 HYPERTENSION ASSOCIATED WITH TYPE 2 DIABETES MELLITUS: ICD-10-CM

## 2024-03-08 DIAGNOSIS — E11.40 TYPE 2 DIABETES MELLITUS WITH DIABETIC NEUROPATHY, WITHOUT LONG-TERM CURRENT USE OF INSULIN: Primary | ICD-10-CM

## 2024-03-08 DIAGNOSIS — N47.1 PHIMOSIS: Primary | ICD-10-CM

## 2024-03-08 DIAGNOSIS — K76.0 NAFLD (NONALCOHOLIC FATTY LIVER DISEASE): ICD-10-CM

## 2024-03-08 DIAGNOSIS — E11.59 HYPERTENSION ASSOCIATED WITH TYPE 2 DIABETES MELLITUS: ICD-10-CM

## 2024-03-08 DIAGNOSIS — Z01.818 PREOPERATIVE TESTING: ICD-10-CM

## 2024-03-08 DIAGNOSIS — E11.52 TYPE 2 DIABETES MELLITUS WITH DIABETIC PERIPHERAL ANGIOPATHY WITH GANGRENE, UNSPECIFIED WHETHER LONG TERM INSULIN USE: ICD-10-CM

## 2024-03-08 LAB
OHS QRS DURATION: 82 MS
OHS QTC CALCULATION: 416 MS
POCT GLUCOSE: 152 MG/DL (ref 70–110)

## 2024-03-08 PROCEDURE — 82962 GLUCOSE BLOOD TEST: CPT | Performed by: UROLOGY

## 2024-03-08 PROCEDURE — 71000044 HC DOSC ROUTINE RECOVERY FIRST HOUR: Performed by: UROLOGY

## 2024-03-08 PROCEDURE — 63600175 PHARM REV CODE 636 W HCPCS: Mod: JZ,JG | Performed by: UROLOGY

## 2024-03-08 PROCEDURE — 88304 TISSUE EXAM BY PATHOLOGIST: CPT | Performed by: STUDENT IN AN ORGANIZED HEALTH CARE EDUCATION/TRAINING PROGRAM

## 2024-03-08 PROCEDURE — 37000008 HC ANESTHESIA 1ST 15 MINUTES: Performed by: UROLOGY

## 2024-03-08 PROCEDURE — 71000016 HC POSTOP RECOV ADDL HR: Performed by: UROLOGY

## 2024-03-08 PROCEDURE — 54161 CIRCUM 28 DAYS OR OLDER: CPT | Mod: ,,, | Performed by: UROLOGY

## 2024-03-08 PROCEDURE — 37000009 HC ANESTHESIA EA ADD 15 MINS: Performed by: UROLOGY

## 2024-03-08 PROCEDURE — 36000706: Performed by: UROLOGY

## 2024-03-08 PROCEDURE — 25000003 PHARM REV CODE 250: Performed by: ANESTHESIOLOGY

## 2024-03-08 PROCEDURE — 36000707: Performed by: UROLOGY

## 2024-03-08 PROCEDURE — 63600175 PHARM REV CODE 636 W HCPCS: Performed by: ANESTHESIOLOGY

## 2024-03-08 PROCEDURE — 71000015 HC POSTOP RECOV 1ST HR: Performed by: UROLOGY

## 2024-03-08 PROCEDURE — 88304 TISSUE EXAM BY PATHOLOGIST: CPT | Mod: 26,,, | Performed by: STUDENT IN AN ORGANIZED HEALTH CARE EDUCATION/TRAINING PROGRAM

## 2024-03-08 PROCEDURE — D9220A PRA ANESTHESIA: Mod: ,,, | Performed by: ANESTHESIOLOGY

## 2024-03-08 RX ORDER — DEXMEDETOMIDINE HYDROCHLORIDE 100 UG/ML
INJECTION, SOLUTION INTRAVENOUS
Status: DISCONTINUED | OUTPATIENT
Start: 2024-03-08 | End: 2024-03-08

## 2024-03-08 RX ORDER — HALOPERIDOL 5 MG/ML
0.5 INJECTION INTRAMUSCULAR EVERY 10 MIN PRN
Status: DISCONTINUED | OUTPATIENT
Start: 2024-03-08 | End: 2024-03-08 | Stop reason: HOSPADM

## 2024-03-08 RX ORDER — OXYCODONE HYDROCHLORIDE 5 MG/1
5 TABLET ORAL EVERY 6 HOURS PRN
Qty: 5 TABLET | Refills: 0 | Status: SHIPPED | OUTPATIENT
Start: 2024-03-08

## 2024-03-08 RX ORDER — SODIUM CHLORIDE 9 MG/ML
INJECTION, SOLUTION INTRAVENOUS CONTINUOUS PRN
Status: DISCONTINUED | OUTPATIENT
Start: 2024-03-08 | End: 2024-03-08

## 2024-03-08 RX ORDER — KETOROLAC TROMETHAMINE 15 MG/ML
15 INJECTION, SOLUTION INTRAMUSCULAR; INTRAVENOUS EVERY 8 HOURS PRN
Status: DISCONTINUED | OUTPATIENT
Start: 2024-03-08 | End: 2024-03-08 | Stop reason: HOSPADM

## 2024-03-08 RX ORDER — CEFAZOLIN SODIUM 1 G/3ML
INJECTION, POWDER, FOR SOLUTION INTRAMUSCULAR; INTRAVENOUS
Status: DISCONTINUED | OUTPATIENT
Start: 2024-03-08 | End: 2024-03-08

## 2024-03-08 RX ORDER — PROPOFOL 10 MG/ML
VIAL (ML) INTRAVENOUS
Status: DISCONTINUED | OUTPATIENT
Start: 2024-03-08 | End: 2024-03-08

## 2024-03-08 RX ORDER — ONDANSETRON HYDROCHLORIDE 2 MG/ML
INJECTION, SOLUTION INTRAVENOUS
Status: DISCONTINUED | OUTPATIENT
Start: 2024-03-08 | End: 2024-03-08

## 2024-03-08 RX ORDER — BUPIVACAINE HYDROCHLORIDE 2.5 MG/ML
INJECTION, SOLUTION EPIDURAL; INFILTRATION; INTRACAUDAL
Status: DISCONTINUED | OUTPATIENT
Start: 2024-03-08 | End: 2024-03-08 | Stop reason: HOSPADM

## 2024-03-08 RX ORDER — LIDOCAINE HYDROCHLORIDE 20 MG/ML
INJECTION, SOLUTION EPIDURAL; INFILTRATION; INTRACAUDAL; PERINEURAL
Status: DISCONTINUED | OUTPATIENT
Start: 2024-03-08 | End: 2024-03-08

## 2024-03-08 RX ORDER — DEXAMETHASONE SODIUM PHOSPHATE 4 MG/ML
INJECTION, SOLUTION INTRA-ARTICULAR; INTRALESIONAL; INTRAMUSCULAR; INTRAVENOUS; SOFT TISSUE
Status: DISCONTINUED | OUTPATIENT
Start: 2024-03-08 | End: 2024-03-08

## 2024-03-08 RX ADMIN — PROPOFOL 140 MG: 10 INJECTION, EMULSION INTRAVENOUS at 08:03

## 2024-03-08 RX ADMIN — DEXAMETHASONE SODIUM PHOSPHATE 8 MG: 4 INJECTION INTRA-ARTICULAR; INTRALESIONAL; INTRAMUSCULAR; INTRAVENOUS; SOFT TISSUE at 09:03

## 2024-03-08 RX ADMIN — CEFAZOLIN 2 G: 330 INJECTION, POWDER, FOR SOLUTION INTRAMUSCULAR; INTRAVENOUS at 09:03

## 2024-03-08 RX ADMIN — DEXMEDETOMIDINE 12 MCG: 200 INJECTION, SOLUTION INTRAVENOUS at 08:03

## 2024-03-08 RX ADMIN — SODIUM CHLORIDE: 0.9 INJECTION, SOLUTION INTRAVENOUS at 08:03

## 2024-03-08 RX ADMIN — ONDANSETRON 4 MG: 2 INJECTION INTRAMUSCULAR; INTRAVENOUS at 09:03

## 2024-03-08 RX ADMIN — DEXMEDETOMIDINE 8 MCG: 200 INJECTION, SOLUTION INTRAVENOUS at 09:03

## 2024-03-08 RX ADMIN — LIDOCAINE HYDROCHLORIDE 100 MG: 20 INJECTION, SOLUTION EPIDURAL; INFILTRATION; INTRACAUDAL at 08:03

## 2024-03-08 NOTE — ANESTHESIA PREPROCEDURE EVALUATION
[facility]  Anesthesia Pre-Operative Evaluation       Patient Name: Kilo Lam  YOB: 1955  MRN: [id number]  [facility]: [id number]      Code Status: No Order   Date of Procedure: 3/8/2024  Anesthesia: General Procedure: Procedure(s) (LRB):  CIRCUMCISION (N/A)  Pre-Operative Diagnosis: Phimosis [N47.1]  Proceduralist: Surgeon(s) and Role:     * Raúl Mckee Jr., MD - Primary        SUBJECTIVE:   Kilo Lam is a 68 y.o. male who  has a past medical history of Anxiety and depression (11/14/2018), Carpal tunnel syndrome, bilateral (4/9/2013), DJD (degenerative joint disease) of lumbar spine (2/9/2015), Essential hypertension (11/6/2012), Fatty liver (11/26/2012), Mixed hyperlipidemia (12/5/2014), Morbid obesity (4/9/2013), and Type 2 diabetes mellitus with diabetic neuropathy, without long-term current use of insulin (12/5/2014). No notes on file    No current facility-administered medications for this encounter.       he has a current medication list which includes the following long-term medication(s): amlodipine, betamethasone valerate 0.1%, blood-glucose meter, dexcom g7 sensor, cetirizine, gabapentin, glipizide, losartan, metformin, rosuvastatin, and spironolactone.   ALLERGIES:     Review of patient's allergies indicates:   Allergen Reactions    No known drug allergies      LDA:          Lines/Drains/Airways       None                 MEDICATIONS:     Antibiotics (From admission, onward)      None          VTE Risk Mitigation (From admission, onward)      None          No current facility-administered medications for this encounter.          History:   There are no hospital problems to display for this patient.    Surgical History:    has a past surgical history that includes Shoulder surgery (Left, 1972); Incisional hernia repair (Left, 12/2012); Colonoscopy (2004); Carpal tunnel release (Left, 11/2011); and Colonoscopy (N/A, 3/27/2019).   Social History:    reports being  sexually active and has had partner(s) who are female.  reports that he quit smoking about 15 years ago. His smoking use included cigarettes. He has been exposed to tobacco smoke. He has never used smokeless tobacco. He reports that he does not drink alcohol and does not use drugs.     OBJECTIVE:     Vital Signs (Most Recent):    Vital Signs Range (Last 24H):          There is no height or weight on file to calculate BMI.   Wt Readings from Last 4 Encounters:   03/01/24 104.9 kg (231 lb 4.2 oz)   01/10/24 105.7 kg (233 lb)   01/10/24 106.1 kg (233 lb 14.5 oz)   12/13/23 103.3 kg (227 lb 11.8 oz)     Significant Labs:  Lab Results   Component Value Date    WBC 7.83 03/04/2024    HGB 12.5 (L) 03/04/2024    HCT 40.8 03/04/2024     03/04/2024     12/13/2023    K 4.6 12/13/2023     12/13/2023    CREATININE 0.9 12/13/2023    BUN 16 12/13/2023    CO2 23 12/13/2023     (H) 12/13/2023    CALCIUM 9.7 12/13/2023    MG 1.6 09/30/2021    ALKPHOS 64 07/26/2023    ALT 14 07/26/2023    AST 12 07/26/2023    ALBUMIN 3.4 (L) 07/26/2023    INR 0.9 03/04/2024    APTT 24.5 03/04/2024    HGBA1C 10.4 (H) 12/13/2023     07/26/2023    BNP 39 07/26/2023     No LMP for male patient.  No results found for this or any previous visit (from the past 72 hour(s)).    EKG:   Results for orders placed or performed during the hospital encounter of 03/04/24   EKG 12-lead    Collection Time: 03/04/24  2:01 PM   Result Value Ref Range    QRS Duration 92 ms    [facility] QTC Calculation 417 ms    Narrative    Test Reason : Z01.818,E11.59,I15.2,    Vent. Rate : 069 BPM     Atrial Rate : 069 BPM     P-R Int : 156 ms          QRS Dur : 092 ms      QT Int : 390 ms       P-R-T Axes : 063 048 088 degrees     QTc Int : 417 ms    Sinus rhythm with Premature atrial complexes in a pattern of bigeminy  Nonspecific T wave abnormality  Abnormal ECG  When compared with ECG of 09-NOV-2022 13:57,  No significant change was found  Confirmed by  Steven Valle MD ([id number]) on 3/4/2024 5:20:16 PM    Referred By: YIN SMITH           Confirmed By:Steven Valle MD       TTE:  Results for orders placed or performed during the hospital encounter of 09/21/23   Echo   Result Value Ref Range    Ascending aorta 2.63 cm    STJ 2.45 cm    AV mean gradient 11 mmHg    Ao peak tashi 2.21 m/s    Ao VTI 52.07 cm    IVS 0.83 0.6 - 1.1 cm    LA size 4.3 cm    Left Atrium Major Axis 5.2 cm    Left Atrium Minor Axis 5.13 cm    LVIDd 4.50 3.5 - 6.0 cm    LVIDs 3.24 2.1 - 4.0 cm    LVOT diameter 2.2 cm    LVOT peak VTI 21.00 cm    Posterior Wall 1.07 0.6 - 1.1 cm    MV Peak A Tasih 1.01 m/s    E wave deceleration time 172.24 msec    MV Peak E Tashi 0.76 m/s    RA Major Axis 4.53 cm    RA Width 3.67 cm    RVDD 3.86 cm    Sinus 2.79 cm    TAPSE 1.88 cm    LA WIDTH 4.60 cm    MV stenosis pressure 1/2 time 49.95 ms    LV Diastolic Volume 92.24 mL    LV Systolic Volume 42.08 mL    LVOT peak tashi 0.90 m/s    TDI LATERAL 0.09 m/s    TDI SEPTAL 0.04 m/s    LA volume (mod) 39.93 cm3    LV LATERAL E/E' RATIO 8.44 m/s    LV SEPTAL E/E' RATIO 19.00 m/s    FS 28 %    LA volume 86.84 cm3    LV mass 142.89 g    Left Ventricle Relative Wall Thickness 0.48 cm    AV valve area 1.53 cm²    AV Velocity Ratio 0.41     AV index (prosthetic) 0.40     MV valve area p 1/2 method 4.40 cm2    E/A ratio 0.75     Mean e' 0.07 m/s    LVOT area 3.8 cm2    LVOT stroke volume 79.79 cm3    AV peak gradient 20 mmHg    E/E' ratio 11.69 m/s    KRISTINA by Velocity Ratio 1.55 cm²    BSA 2.23 m2    LV Systolic Volume Index 19.6 mL/m2    LV Diastolic Volume Index 42.90 mL/m2    LV Mass Index 66 g/m2    LA Volume Index 40.4 mL/m2    LA Volume Index (Mod) 18.6 mL/m2    ZLVIDS -2.15     ZLVIDD -4.39     Est. RA pres 3 mmHg    Narrative      Technically challenging study with poor endocardial visualization. LV   wall motion is unable to be assessed in apical images due to poor image   quality. Would use echo contrast for future studies.    " Left Ventricle: Not well visualized due to poor sonic window. The left   ventricle is normal in size. Ventricular mass is normal. Normal wall   thickness. There is concentric remodeling. Normal wall motion. There is   low normal systolic function with a visually estimated ejection fraction   of 50 - 55%. Grade I diastolic dysfunction.    Left Atrium: Left atrium is mildly dilated.    Right Ventricle: Normal right ventricular cavity size. Wall thickness   is normal. Right ventricle wall motion  is normal. Systolic function is   normal.    Aortic Valve: Not well visualized due to poor acoustic window. There is   mild annular calcification present. There is mild stenosis. Aortic valve   area by VTI is 1.53 cm². Aortic valve peak velocity is 2.21 m/s. Mean   gradient is 11 mmHg. The dimensionless index is 0.40. There is no   significant regurgitation.    IVC/SVC: Normal venous pressure at 3 mmHg.       No results found for: "EF"   No results found for this or any previous visit.  ELIE:  No results found for this or any previous visit.  Stress Test:  No results found for this or any previous visit.     LHC:  No results found for this or any previous visit.     PFT:  No results found for: "FEV1", "FVC", "KCJ0YSE", "TLC", "DLCO"   ASSESSMENT/PLAN:           Pre-op Assessment    I have reviewed the NPO Status.      Review of Systems  Anesthesia Hx:  No problems with previous Anesthesia   History of prior surgery of interest to airway management or planning:          Denies Family Hx of Anesthesia complications.    Denies Personal Hx of Anesthesia complications.                    Social:  Non-Smoker, Social Alcohol Use       Hematology/Oncology:  Hematology Normal   Oncology Normal                                   EENT/Dental:  EENT/Dental Normal           Cardiovascular:  Exercise tolerance: good   Denies Pacemaker. Hypertension, well controlled   Denies MI.  Denies CAD.     Denies Dysrhythmias.   Denies Angina.     " hyperlipidemia     Functional Capacity good / => 4 METS      Hx of Heart Murmur        Peripheral Arterial Disease                   Pulmonary:    Denies COPD.  Denies Asthma.   Denies Shortness of breath.  Sleep Apnea (I have sleep apnea but I don't use cpap. I sleep on my side.)                Renal/:  Renal/ Normal                 Hepatic/GI:      Liver Disease,         Liver Disease, Fatty Liver     NAFLD   Musculoskeletal:         Spine Disorders: lumbar Degenerative disease           Neurological:  Denies TIA.  Denies CVA.    Denies Seizures.                              Neuromuscular Disease Pt h/o bilat CTS s/p left release in 2011  Endocrine:  Diabetes, poorly controlled, type 2, using insulin    Diabetes, Type 2 Diabetes     , most recent HgA1c value was 10.4 on 12/13/23.        Poorly controlled DM II with consistently high A1C results. Seeing endo for help getting better control. Last endo appt 01/10/24..       Obesity / BMI > 30  Psych:  Psychiatric History anxiety depression                Physical Exam  General: Well nourished    Airway:  Mallampati: III / II  Mouth Opening: Normal  TM Distance: Normal  Tongue: Normal  Neck ROM: Normal ROM    Dental:  Intact          Anesthesia Assessment: Preoperative EQUATION    Planned Procedure: Procedure(s) (LRB):  CIRCUMCISION (N/A)  Requested Anesthesia Type:General  Surgeon: Raúl Mckee Jr., MD  Service: Urology  Known or anticipated Date of Surgery:3/8/2024    Surgeon notes: reviewed    Electronic QUestionnaire Assessment completed via nurse interview with patient.        Triage considerations:     The patient has no apparent active cardiac condition (No unstable coronary Syndrome such as severe unstable angina or recent [<1 month] myocardial infarction, decompensated CHF, severe valvular   disease or significant arrhythmia)    Previous anesthesia records:GETA and No problems  11/27/2012 REPAIR, HERNIA, INGUINAL, WITHOUT HISTORY OF PRIOR REPAIR, AGE  3 YEARS OR OLDER (Left: Groin), general anesthesia, ASA 3  Airway/Jaw/Neck:  Airway Findings:  Mouth Opening: Normal  Tongue: Large  General Airway Assessment: Adult  Mallampati: III  Improves with phonation: II        Last PCP note: within 3 months , within [facility]   Subspecialty notes: Endocrinology, Urology    Other important co-morbidities: DM2, HLD, HTN, Obesity, CASEY, and anxiety, depression, bilateral carpal tunnel syndrome s/p left side release 2011, h/o left inguinal hernia repair 2012, DJD lumbar spine, NAFLD, PAD, phimosis       Tests already available:  Available tests,  within 3 months , 3-6 months ago , within [facility] .   12/13/23 A1C (10.4), BMP  09/21/23 TTE (EF 50-55%)            Instructions given. (See in Nurse's note)    Optimization:  Anesthesia Preop Clinic Assessment Indicated:     --phone screen done    Medical Opinion Indicated--pcp            Plan:    Testing:  EKG, Hematology Profile, PT/INR, and PTT     Consultation:Patient's PCP for a statement of optimization      Patient  has previously scheduled Medical Appointment:    Navigation: Tests Scheduled.              Consults scheduled.             Results will be tracked by Preop Clinic.                     Anesthesia Plan  Type of Anesthesia, risks & benefits discussed:    Anesthesia Type: Gen ETT, Gen Supraglottic Airway, Gen Natural Airway  Intra-op Monitoring Plan: Standard ASA Monitors  Post Op Pain Control Plan: multimodal analgesia  Induction:  IV  Informed Consent: Informed consent signed with the Patient and all parties understand the risks and agree with anesthesia plan.  All questions answered.   ASA Score: 3  Day of Surgery Review of History & Physical: H&P completed by Anesthesiologist.    Ready For Surgery From Anesthesia Perspective.     .

## 2024-03-08 NOTE — ANESTHESIA PROCEDURE NOTES
Intubation    Date/Time: 3/8/2024 8:57 AM    Performed by: Damaris Man MD  Authorized by: Damaris Man MD    Intubation:     Induction:  Intravenous    Intubated:  Postinduction    Mask Ventilation:  Not attempted    Attempts:  1    Attempted By:  Staff anesthesiologist    Difficult Airway Encountered?: No      Complications:  None    Airway Device:  Supraglottic airway/LMA    Airway Device Size:  4.0    Style/Cuff Inflation:  Cuffed    Placement Verified By:  Capnometry    Complicating Factors:  None    Findings Post-Intubation:  BS equal bilateral

## 2024-03-08 NOTE — DISCHARGE SUMMARY
[facility] (2nd Fl)  Discharge Note  Short Stay    Procedure(s) (LRB):  CIRCUMCISION (N/A)      OUTCOME: Patient tolerated treatment/procedure well without complication and is now ready for discharge.    DISPOSITION: Home or Self Care    FINAL DIAGNOSIS:  <principal problem not specified>    FOLLOWUP: In clinic in 4-6 weeks    DISCHARGE INSTRUCTIONS:    Discharge Procedure Orders   CBC Without Differential   Standing Status: Future Number of Occurrences: 1 Standing Exp. Date: 04/30/25     Protime-INR   Standing Status: Future Number of Occurrences: 1 Standing Exp. Date: 04/30/25     APTT   Standing Status: Future Number of Occurrences: 1 Standing Exp. Date: 04/30/25     Notify your health care provider if you experience any of the following:  temperature >100.4     Notify your health care provider if you experience any of the following:  persistent nausea and vomiting or diarrhea     Notify your health care provider if you experience any of the following:  severe uncontrolled pain     Notify your health care provider if you experience any of the following:  redness, tenderness, or signs of infection (pain, swelling, redness, odor or green/yellow discharge around incision site)     Notify your health care provider if you experience any of the following:  worsening rash     Notify your health care provider if you experience any of the following:  persistent dizziness, light-headedness, or visual disturbances     EKG 12-lead   Standing Status: Future Number of Occurrences: 1 Standing Exp. Date: 03/01/25        TIME SPENT ON DISCHARGE: 10 minutes

## 2024-03-08 NOTE — ANESTHESIA POSTPROCEDURE EVALUATION
Anesthesia Post Evaluation    Patient: Kilo Lam    Procedure(s) Performed: Procedure(s) (LRB):  CIRCUMCISION (N/A)    Final Anesthesia Type: general      Patient location during evaluation: [facility]  Patient participation: Yes- Able to Participate  Level of consciousness: awake and alert  Post-procedure vital signs: reviewed and stable  Pain management: adequate  Airway patency: patent    PONV status at discharge: No PONV  Anesthetic complications: no      Cardiovascular status: blood pressure returned to baseline  Respiratory status: unassisted  Hydration status: euvolemic  Follow-up not needed.              Vitals Value Taken Time   /69 03/08/24 1101   Temp 36.5 °C (97.7 °F) 03/08/24 0955   Pulse 62 03/08/24 1101   Resp 18 03/08/24 1101   SpO2 92 % 03/08/24 1101   Vitals shown include unvalidated device data.      No case tracking events are documented in the log.      Pain/Daniel Score: Daniel Score: 9 (3/8/2024 10:15 AM)

## 2024-03-08 NOTE — TRANSFER OF CARE
"Anesthesia Transfer of Care Note    Patient: Kilo Lam    Procedure(s) Performed: Procedure(s) (LRB):  CIRCUMCISION (N/A)    Patient location: [facility]    Anesthesia Type: general    Transport from OR: Transported from OR on 6-10 L/min O2 by face mask with adequate spontaneous ventilation    Post pain: adequate analgesia    Post assessment: no apparent anesthetic complications    Post vital signs: stable    Level of consciousness: awake and oriented    Nausea/Vomiting: no nausea/vomiting    Complications: none    Transfer of care protocol was followed      Last vitals: Visit Vitals  BP (!) 158/68 (BP Location: Left arm, Patient Position: Lying)   Pulse (!) 57   Temp 36.5 °C (97.7 °F) (Temporal)   Resp 10   Ht 5' 7" (1.702 m)   Wt 104.3 kg (230 lb)   SpO2 100%   BMI 36.02 kg/m²     " Patient denies any changes in diet, medications, or health that would effect warfarin therapy.  Patient was re-educated on situations that would require placing a call to the coumadin clinic, including bleeding or unusual bruising issues, changes in health, diet or medications, upcoming procedures that require warfarin interruption, and missed coumadin dose(s). Patient expressed understanding that avoidance of consistency with these parameters could cause fluctuations in INR, leading to more frequent visits and increase risk of adverse events.

## 2024-03-08 NOTE — PROGRESS NOTES
Pt discharged per orders. AAOx'a 3. VSS. No s/s of acute distress. Resp even and unlabored. Denies complaints of pain. IV removed prior to discharge. Medication delivered at bedside.Reviewed discharge instructions, follow up care/appointments with patient and spouse. Patient and spouse verbalized understanding of discharge and follow up care/appointments. Discharged with all personal belongings.Escorted out with staff in wheelchair.Pt transported home via personal transportation.

## 2024-03-08 NOTE — LETTER
Susie Washington accompanied  their spouse to the [facility] on 03/8/2024. They may return to school on 03/11/2024.  If you have any questions or concerns, please don't hesitate to call.

## 2024-03-08 NOTE — LETTER
March 8, 2024         [address]  [place] [address]  Phone: [phone]  Fax: [phone]       Date of Visit: 03/08/2024    To Whom It May Concern:    Please be advised that under state and federal laws as it relates to patient privacy and Health Insurance Accountability Act (HIPAA), we can not release our patient(s) name without authorization. Although, we can confirm that the individual listed below did accompany a person to our facility for healthcare services to be provided.    This document confirms that Haven Newton accompanied a patient to our facility on 03/08/2024.    Sincerely,     Ade Peguero RN

## 2024-03-08 NOTE — H&P
[facility]  Urology Progress Note  2024    Subjective:       Patient ID: Kilo Lam is a 68 y.o. male.    Chief Complaint: phimosis (Pt here for phimosis related issues. )    Patient is here with phimosis he would like to have a circumcision he is  on aspirin he will need to have clearance I have asked him hold the aspirin.    Patient presents today for surgery. He received clearance from his PCP.    Past Medical History:   Diagnosis Date    Anxiety and depression 2018    Carpal tunnel syndrome, bilateral 2013    S/p Lt Carpal tunnel release 2011     DJD (degenerative joint disease) of lumbar spine 2015    Essential hypertension 2012    Fatty liver 2012    2012: US of ABD showed: Severe hepatic steatosis and  Hepatomegaly.    Mixed hyperlipidemia 2014    Morbid obesity 2013    BMI= 41.2 on 2013     Type 2 diabetes mellitus with diabetic neuropathy, without long-term current use of insulin 2014       Past Surgical History:   Procedure Laterality Date    CARPAL TUNNEL RELEASE Left 2011    no relief of hand symptoms    COLONOSCOPY  2004    normal, repeat in 7 yrs    COLONOSCOPY N/A 3/27/2019    Procedure: COLONOSCOPY;  Surgeon: Smooth German MD;  Location: [facility] ([address] [place] [address]);  Service: Endoscopy;  Laterality: N/A;    INCISIONAL HERNIA REPAIR Left 2012    inguinal    SHOULDER SURGERY Left        Family History   Problem Relation Age of Onset    Diabetes Mother     Diabetes Father     Heart failure Father     Cancer Daughter 14        Leukemia, passed    Cancer Other         niece, Lung cancer    Liver disease Neg Hx     Colon cancer Neg Hx        Social History     Socioeconomic History    Marital status:      Spouse name: Alicia    Number of children: 5   Tobacco Use    Smoking status: Former     Current packs/day: 0.00     Types: Cigarettes     Quit date: 2009     Years since quittin.9      Passive exposure: Past    Smokeless tobacco: Never   Substance and Sexual Activity    Alcohol use: No    Drug use: No    Sexual activity: Yes     Partners: Female   Social History Narrative    Retired . Now a .     to Alicia.  5 children.       Allergies:  No known drug allergies    Medications:    Current Outpatient Medications:     amLODIPine (NORVASC) 10 MG tablet, Take 1 tablet (10 mg total) by mouth once daily., Disp: 90 tablet, Rfl: 3    aspirin (ECOTRIN) 81 MG EC tablet, Take 81 mg by mouth once daily., Disp: , Rfl:     betamethasone valerate 0.1% (VALISONE) 0.1 % Oint, Apply topically 2 (two) times daily., Disp: 30 g, Rfl: 3    blood sugar diagnostic Strp, 1 strip by Misc.(Non-Drug; Combo Route) route before meals and at bedtime as needed. (Patient not taking: Reported on 1/10/2024), Disp: 200 strip, Rfl: 4    blood-glucose meter kit, To check BG 3 times daily, to use with insurance preferred meter, Disp: 1 each, Rfl: 0    blood-glucose sensor (DEXCOM G7 SENSOR) [place], 1 each by Misc.(Non-Drug; Combo Route) route every 10 days., Disp: 3 each, Rfl: 11    cetirizine (ZYRTEC) 10 MG tablet, Take 1 tablet (10 mg total) by mouth every evening., Disp: 30 tablet, Rfl: 0    empagliflozin (JARDIANCE) 10 mg tablet, Take 1 tablet (10 mg total) by mouth once daily. (Patient not taking: Reported on 1/10/2024), Disp: 30 tablet, Rfl: 2    fluticasone (FLONASE) 50 mcg/actuation nasal spray, 1 spray by Nasal route., Disp: , Rfl:     gabapentin (NEURONTIN) 300 MG capsule, Take 1 capsule (300 mg total) by mouth 3 (three) times daily., Disp: 90 capsule, Rfl: 2    glipiZIDE (GLUCOTROL) 10 MG TR24, Take 1 tablet (10 mg total) by mouth daily with breakfast., Disp: 90 tablet, Rfl: 3    hydrOXYzine HCL (ATARAX) 25 MG tablet, Take 1 tablet (25 mg total) by mouth every evening. If too sedating okay to take 1/2 a pill, Disp: 30 tablet, Rfl: 2    insulin glargine, TOUJEO, (TOUJEO SOLOSTAR U-300  "INSULIN) 300 unit/mL (1.5 mL) InPn pen, Inject 15 Units into the skin once daily., Disp: 1.5 mL, Rfl: 11    lancets (LANCETS,ULTRA THIN) Misc, 1 application by Misc.(Non-Drug; Combo Route) route before meals and at bedtime as needed. (Patient not taking: Reported on 1/10/2024), Disp: 200 each, Rfl: 4    losartan (COZAAR) 100 MG tablet, Take 1 tablet (100 mg total) by mouth once daily., Disp: 90 tablet, Rfl: 3    meloxicam (MOBIC) 15 MG tablet, Take 1 tablet (15 mg total) by mouth once daily., Disp: 30 tablet, Rfl: 0    metFORMIN (GLUCOPHAGE) 1000 MG tablet, Take 1 tablet (1,000 mg total) by mouth 2 (two) times daily with meals., Disp: 180 tablet, Rfl: 3    pen needle, diabetic (BD ULTRA-FINE KOBI PEN NEEDLE) 32 gauge x 5/32" Ndle, 1 each by Misc.(Non-Drug; Combo Route) route once daily., Disp: 100 each, Rfl: 3    rosuvastatin (CRESTOR) 10 MG tablet, Take 1 tablet (10 mg total) by mouth once daily., Disp: 90 tablet, Rfl: 3    semaglutide (OZEMPIC) 0.25 mg or 0.5 mg (2 mg/3 mL) pen injector, Inject 0.5 mg into the skin every 7 days. (Patient not taking: Reported on 1/10/2024), Disp: 3 mL, Rfl: 2    spironolactone (ALDACTONE) 25 MG tablet, Take 1 tablet (25 mg total) by mouth once daily., Disp: 90 tablet, Rfl: 3    Review of Systems   Constitutional:  Negative for activity change, appetite change, chills and fatigue.   HENT: Negative.  Negative for congestion and hearing loss.    Eyes: Negative.  Negative for discharge and visual disturbance.   Respiratory: Negative.  Negative for cough, chest tightness and shortness of breath.    Cardiovascular: Negative.  Negative for chest pain and leg swelling.   Gastrointestinal: Negative.  Negative for constipation, diarrhea, nausea and vomiting.   Endocrine: Negative.  Negative for cold intolerance and heat intolerance.   Genitourinary:  Positive for penile swelling. Negative for difficulty urinating, flank pain and hematuria.   Musculoskeletal: Negative.  Negative for " arthralgias and joint swelling.   Skin: Negative.  Negative for color change and wound.   Neurological: Negative.  Negative for dizziness, seizures and headaches.   Hematological: Negative.  Does not bruise/bleed easily.   Psychiatric/Behavioral: Negative.  Negative for agitation, behavioral problems and confusion.        Objective:      Physical Exam  Vitals and nursing note reviewed.   Constitutional:       Appearance: Normal appearance.   HENT:      Head: Atraumatic.      Nose: Nose normal.   Eyes:      Extraocular Movements: Extraocular movements intact.      Pupils: Pupils are equal, round, and reactive to light.   Cardiovascular:      Rate and Rhythm: Normal rate.   Pulmonary:      Effort: Pulmonary effort is normal.   Abdominal:      General: Abdomen is flat. There is no distension.      Tenderness: There is no abdominal tenderness. There is no right CVA tenderness or left CVA tenderness.   Genitourinary:     Comments: Severe phimosis, no rash  Musculoskeletal:         General: Normal range of motion.      Cervical back: Normal range of motion.   Skin:     Coloration: Skin is not jaundiced.   Neurological:      General: No focal deficit present.      Mental Status: He is alert and oriented to person, place, and time.   Psychiatric:         Mood and Affect: Mood normal.         Behavior: Behavior normal.         Assessment:       1. Phimosis    2. Phimosis of penis        Plan:       Kilo was seen today for phimosis.    Diagnoses and all orders for this visit:    Phimosis    Phimosis of penis  -     Ambulatory referral/consult to Urology     - To OR today for circumcision, severe phimosis  - consent obtained in preop, no further questions or concerns  - Reports holding aspirin for 7 days  - Cleared by PCP      Haja Rodriguez MD  [facility] - PGY3]

## 2024-03-08 NOTE — DISCHARGE INSTRUCTIONS
Post Circumcision or Penile Skin Surgery Instructions    No sex or masturbation for SIX weeks  Ok to remove dressing in 1 days  Do not get your incision wet for 2 days  If you had a circumcision, ok to put bacitracin/Neosporin on incision to keep it from sticking to your underwear.  Return to ER or call [phone] and ask to speak with the urology clinic if you have any excessive bleeding or swelling  Use Tylenol or Advil first then use the pain medicine we prescribe for breakthrough pain medicine, do not exceed 4000mg of Tylenol  If you see excessive bleeding or swelling, return to ER    You will follow up with dr. Mckee in 4-6 weeks.  Please take tylenol and ibuprofen for pain control. Only take oxycodone narcotic pain for breakthrough.

## 2024-03-08 NOTE — OP NOTE
[facility]  Operative Note    Date: 03/08/2024    Pre-Op Diagnosis: phimosis    Post-Op Diagnosis: same    Procedure(s) Performed:   1.  Circumcision     Specimen(s): foreskin    Staff Surgeon: Raúl Mckee MD    Assistant Surgeon: MD Ledy Eng MD    Anesthesia: General LMA anesthesia    Indications: Kilo Lam is a 68 y.o. male with phimosis who presents for a circumcision.      Findings:   Severe phimosis, circumcision performed without complication  Penile block with 0.25% marcaine    Estimated Blood Loss: min    Drains: none    Procedure in detail:  After risks, benefits and possible complications of circumcision were discussed, the patient elected to under go the procedure and informed consent was obtained.  All questions were answered in the pre-operative area. The patient was transferred to the operative suite and placed in supine position.  SCDs were applied and working.  After adequate anesthesia the patient was prepped and draped in the usual sterile fashion. Time out was preformed, aries-procedural antibiotic were confirmed.     A marking pen was used to masha our incisions, at the coronal sulcus with the foreskin in the normal anatomic position and 1 cm proximal to the glans in the retracted position.  A 15 blade was used to sharply incise our marked lines.  The foreskin was reduced and removed with electrocautery by connecting the two incisions we just made. The free edges were then re approximated in a interrupted fashion using a 3-0 chromic.     We then performed a standard penile block with 10 cc of 0.25% marcaine.    A sterile dressing was applied.  The patient tolerated the procedure well and was transferred to the PACU in stable condition    Disposition: The patient will follow up with Dr. Mckee in 4-6 weeks.  He was given prescriptions for pain meds and instructed to avoid sex/masturbation x 6 weeks.      Haja Rodriguez MD

## 2024-03-09 NOTE — PROGRESS NOTES
Sent message from peer regarding recent appointment expressing concerns of compliance regarding diabetes medication management,. Being seen in endocrine clinic. Will place referral for diabetes education.

## 2024-03-13 ENCOUNTER — CLINICAL SUPPORT (OUTPATIENT)
Dept: DIABETES | Facility: CLINIC | Age: 69
End: 2024-03-13
Payer: MEDICARE

## 2024-03-13 DIAGNOSIS — E11.40 TYPE 2 DIABETES MELLITUS WITH DIABETIC NEUROPATHY, WITHOUT LONG-TERM CURRENT USE OF INSULIN: ICD-10-CM

## 2024-03-13 DIAGNOSIS — E11.40 TYPE 2 DIABETES MELLITUS WITH DIABETIC NEUROPATHY, WITHOUT LONG-TERM CURRENT USE OF INSULIN: Primary | ICD-10-CM

## 2024-03-13 DIAGNOSIS — E11.52 TYPE 2 DIABETES MELLITUS WITH DIABETIC PERIPHERAL ANGIOPATHY WITH GANGRENE, UNSPECIFIED WHETHER LONG TERM INSULIN USE: ICD-10-CM

## 2024-03-13 LAB
FINAL PATHOLOGIC DIAGNOSIS: NORMAL
GROSS: NORMAL
Lab: NORMAL

## 2024-03-13 PROCEDURE — G0108 DIAB MANAGE TRN  PER INDIV: HCPCS | Mod: 95,,, | Performed by: INTERNAL MEDICINE

## 2024-03-13 RX ORDER — BLOOD-GLUCOSE SENSOR
1 EACH MISCELLANEOUS
Qty: 3 EACH | Refills: 11 | Status: SHIPPED | OUTPATIENT
Start: 2024-03-13 | End: 2025-03-13

## 2024-03-13 RX ORDER — BLOOD-GLUCOSE,RECEIVER,CONT
1 EACH MISCELLANEOUS CONTINUOUS
Qty: 1 EACH | Refills: 0 | Status: SHIPPED | OUTPATIENT
Start: 2024-03-13 | End: 2025-03-13

## 2024-03-13 NOTE — TELEPHONE ENCOUNTER
----- Message from Tianna Nagy RN, CDE sent at 3/13/2024  1:52 PM CDT -----  Regarding: Dexcom G7  Please call in RX for Dexcom G7 sensors and  to [facility] on [address].    Thanks, Tianna

## 2024-03-15 NOTE — PROGRESS NOTES
Diabetes Care Specialist Virtual Visit Note   The patient location is: Home in [place]  The chief complaint leading to consultation is: Diabetes  Visit type: audiovisual  Total time spent with patient: 60 min   Each patient to whom he or she provides medical services by telemedicine is:  (1) informed of the relationship between the physician and patient and the respective role of any other health care provider with respect to management of the patient; and (2) notified that he or she may decline to receive medical services by telemedicine and may withdraw from such care at any time.      Diabetes Care Specialist Progress Note  Author: Tianna Nagy RN, CDE  Date: 3/13/2024    Program Intake  Reason for Diabetes Program Visit:: Initial Diabetes Assessment  Current diabetes risk level:: high  In the last 12 months, have you:: been admitted to a hospital  Was the ER or hospital admission related to diabetes?: No  Permission to speak with others about care:: no  Continuous Glucose Monitoring  Patient has CGM: No    Lab Results   Component Value Date    HGBA1C 10.4 (H) 12/13/2023       Clinical  There is no height or weight on file to calculate BMI. Patient was seen virtually    Patient Health Rating  Compared to other people your age, how would you rate your health?: Fair    Problem Review  Reviewed Problem List with Patient: yes  Active comorbidities affecting diabetes self-care.: yes  Comorbidities: Neuropathy, Hypertension  Reviewed health maintenance: no (see comments)    Clinical Assessment  Current Diabetes Treatment: Oral Medication, Insulin (Toujeo 15 units daily; Ozempic 0.25 mg weekly; glipizide 10 mg; Metformin 1000 mg bid; not taking Jardiance)  Have you ever experienced hypoglycemia (low blood sugar)?: no  Have you ever experienced hyperglycemia (high blood sugar)?: no    Medication Information  How do you obtain your medications?: Family picks up  How many days a week do you miss your medications?:  1      Additional Social History    Support  Does anyone support you with your diabetes care?: yes  Who supports you?: spouse  Who takes you to your medical appointments?: spouse, self  Does the current support meet the patient's needs?: Yes  Is Support an area impacting ability to self-manage diabetes?: No    Access to Mass Media & Technology  Does the patient have access to any of the following devices or technologies?: Smart phone, Internet Access  Media or technology needs impacting ability to self-manage diabetes?: No    Cognitive/Behavioral Health  Alert and Oriented: Yes  Difficulty Thinking: No  Requires Prompting: No  Requires assistance for routine expression?: No  Cognitive or behavioral barriers impacting ability to self-manage diabetes?: No         Communication  Language preference: English  Hearing Problems: No  Vision Problems: No  Communication needs impacting ability to self-manage diabetes?: No    Health Literacy  Preferred Learning Method: Face to Face  How often do you need to have someone help you read instructions, pamphlets, or written material from your doctor or pharmacy?: Rarely  Health literacy needs impacting ability to self-manage diabetes?: No      Diabetes Self-Management Skills Assessment    Diabetes Disease Process/Treatment Options  Patient/caregiver able to state what happens when someone has diabetes.: somewhat  Patient/caregiver knows what type of diabetes they have.: no  Diabetes Disease Process/Treatment Options: Skills Assessment Completed: Yes  Assessment indicates:: Knowledge deficit    Nutrition/Healthy Eating  Nutrition/Healthy Eating Skills Assessment Completed:: No  Deffered due to:: Time    Physical Activity/Exercise  Patient's daily activity level:: sedentary  Patient formally exercises outside of work.: no  Reasons for not exercising:: physically unable to exercise currently  Patient can identify forms of physical activity.: no  Patient can identify reasons why  exercise/physical activity is important in diabetes management.: no  Physical Activity/Exercise Skills Assessment Completed: : Yes  Assessment indicates:: Knowledge deficit  Area of need?: Yes    Medications  Patient is able to describe current diabetes management routine.: yes  Diabetes management routine:: injectable medications, oral medications, insulin  Patient is able to identify current diabetes medications, dosages, and appropriate timing of medications.: yes  Patient understands the purpose of the medications taken for diabetes.: no  Patient reports problems or concerns with current medication regimen.: yes  Medication regimen problems/concerns:: other (see comments) (Unsure of how his meds works)  Medication Skills Assessment Completed:: Yes  Assessment indicates:: Instruction Needed, Knowledge deficit  Area of need?: Yes    Home Blood Glucose Monitoring  Patient states that blood sugar is checked at home daily.: no  Reasons for not monitoring:: needs training  Home Blood Glucose Monitoring Skills Assessment Completed: : Yes  Assessment indicates:: Knowledge deficit  Area of need?: Yes    Acute Complications  Acute Complications Skills Assessment Completed: : No  Deffered due to:: Time    Chronic Complications  Chronic Complications Skills Assessment Completed: : No  Deferred due to:: Time    Psychosocial/Coping  Psychosocial/Coping Skills Assessment Completed: : No  Deffered due to:: Time      Assessment Summary and Plan    Based on today's diabetes care assessment, the following areas of need were identified:          3/13/2024    12:01 AM   Social   Support No   Access to Mass Media/Tech No   Cognitive/Behavioral Health No   Communication No   Health Literacy No                  3/13/2024    12:01 AM   Diabetes Self-Management Skills   Physical Activity/Exercise Yes, encouraged activity as tolerated   Medication Yes, see care planning   Home Blood Glucose Monitoring Yes, Dexcom system was ordered.   Patient will call for training. Advised to use the glucometer he has at home to check his glucose          Today's interventions were provided through individual discussion, instruction, and written materials were provided.      Patient verbalized understanding of instruction and written materials.  Pt was able to return back demonstration of instructions today. Patient understood key points, needs reinforcement and further instruction.     Diabetes Self-Management Care Plan:    Today's Diabetes Self-Management Care Plan was developed with Kilo's input. Kilo has agreed to work toward the following goal(s) to improve his/her overall diabetes control.      Care Plan: Diabetes Management   Updates made since 2/14/2024 12:00 AM     Problem: Medications         Long-Range Goal: Patient Agrees to take Diabetes Medication(s) as prescribed.    Start Date: 3/13/2024   Priority: High   Barriers: Knowledge deficit   Note: Patient seen virtually for DM education.  The focus of today's visit was primary reviewing his medications.  He was confused as to what to take and when.  We spent time reviewing the MOA, timing and dosing of his diabetes medications. Advised to take all medications as instructed. He verbalized better understanding of his meds now.      Task: Reviewed with patient all current diabetes medications and provided basic review of the purpose, dosage, frequency, side effects, and storage of both oral and injectable diabetes medications. Completed 3/15/2024     Task: Discussed guidelines for preventing, detecting and treating hypoglycemia and hyperglycemia and reviewed the importance of meal and medication timing with diabetes mediations for prevention of hypoglycemia and maximum drug benefit. Completed 3/15/2024          Follow Up Plan     Will reach out when he receives his Dexcom G7 for training. Will complete assessment with next visit    Today's care plan and follow up schedule was discussed with patient.   Kilo verbalized understanding of the care plan, goals, and agrees to follow up plan.        The patient was encouraged to communicate with his/her health care provider/physician and care team regarding his/her condition(s) and treatment.  I provided the patient with my contact information today and encouraged to contact me via phone or [facility] Portal as needed.     Length of Visit   Total Time: 60 Minutes

## 2024-03-18 ENCOUNTER — TELEPHONE (OUTPATIENT)
Dept: DERMATOLOGY | Facility: CLINIC | Age: 69
End: 2024-03-18
Payer: MEDICARE

## 2024-03-18 NOTE — TELEPHONE ENCOUNTER
Spoke to pt regarding r/s appointment due to [facility] being out of clinic. Pt agreed to date and time of reschedule, appt set for 4/2/2024

## 2024-03-19 NOTE — HIM RECORD RETIREMENT NOTE
long term of Incomplete Medical Record    3/19/24    Patient Name: Kilo Lam  Contact Serial # (CSN): [id number]  Patient Medical Record # (MRN): [id number]  Date of Service: Orders Only on 12/27/2022  Physician Name: Reyes Trujillo,     This record has been reviewed and is being retired as incomplete by the approval of the  Medical Staff Operating Committee (MSOC)     On 2/8/2023., due to:  Unavailability of Provider     Missing Information/Comments:  []    Discharge Summary   []    DC Note/Short Stay Summary   []    ED Provider Note   []    Delivery Note   []    History & Physical   []   Operative Note   []     Procedure Note   []     Physician Order   [x]     Verbal Order   []       Other, specify:

## 2024-03-19 NOTE — HIM RECORD RETIREMENT NOTE
FDC of Incomplete Medical Record    3/19/24    Patient Name: Kilo Lam  Contact Serial # (CSN): [id number]  Patient Medical Record # (MRN): [id number]  Date of Service: Orders Only on 12/27/2022  Physician Name: Reyes Trujillo MD [[id number]]     This record has been reviewed and is being retired as incomplete by the approval of the  Medical Staff Operating Committee (MSOC)     On 02/08/2023., due to:  Unavailability of Provider     Missing Information/Comments:  []    Discharge Summary   []    DC Note/Short Stay Summary   []    ED Provider Note   []    Delivery Note   []    History & Physical   []   Operative Note   []     Procedure Note   []     Physician Order   [x]     Verbal Order   []       Other, specify:

## 2024-04-08 ENCOUNTER — OFFICE VISIT (OUTPATIENT)
Dept: UROLOGY | Facility: CLINIC | Age: 69
End: 2024-04-08
Payer: MEDICARE

## 2024-04-08 VITALS
DIASTOLIC BLOOD PRESSURE: 80 MMHG | WEIGHT: 229.25 LBS | SYSTOLIC BLOOD PRESSURE: 193 MMHG | HEART RATE: 80 BPM | BODY MASS INDEX: 35.91 KG/M2

## 2024-04-08 DIAGNOSIS — Z98.890 POST-OPERATIVE STATE: Primary | ICD-10-CM

## 2024-04-08 PROCEDURE — 3077F SYST BP >= 140 MM HG: CPT | Mod: CPTII,S$GLB,, | Performed by: UROLOGY

## 2024-04-08 PROCEDURE — 3072F LOW RISK FOR RETINOPATHY: CPT | Mod: CPTII,S$GLB,, | Performed by: UROLOGY

## 2024-04-08 PROCEDURE — 3288F FALL RISK ASSESSMENT DOCD: CPT | Mod: CPTII,S$GLB,, | Performed by: UROLOGY

## 2024-04-08 PROCEDURE — 1101F PT FALLS ASSESS-DOCD LE1/YR: CPT | Mod: CPTII,S$GLB,, | Performed by: UROLOGY

## 2024-04-08 PROCEDURE — 99999 PR PBB SHADOW E&M-EST. PATIENT-LVL III: CPT | Mod: PBBFAC,,, | Performed by: UROLOGY

## 2024-04-08 PROCEDURE — 1157F ADVNC CARE PLAN IN RCRD: CPT | Mod: CPTII,S$GLB,, | Performed by: UROLOGY

## 2024-04-08 PROCEDURE — 3079F DIAST BP 80-89 MM HG: CPT | Mod: CPTII,S$GLB,, | Performed by: UROLOGY

## 2024-04-08 PROCEDURE — 1159F MED LIST DOCD IN RCRD: CPT | Mod: CPTII,S$GLB,, | Performed by: UROLOGY

## 2024-04-08 PROCEDURE — 99024 POSTOP FOLLOW-UP VISIT: CPT | Mod: S$GLB,,, | Performed by: UROLOGY

## 2024-04-08 NOTE — PROGRESS NOTES
Subjective:       Patient ID: Kilo Lam is a 68 y.o. male.    Chief Complaint: Post-op Evaluation (Pt here for post op f/u after circ. )    HPI  patient is status post circumcision he is healing well he has a mild yeast infection on his inner thighs he is going to get some over-the-counter medications for that.  No fever chills nausea vomiting and his circ site is healing well    Past Medical History:   Diagnosis Date    Anxiety and depression 11/14/2018    Carpal tunnel syndrome, bilateral 4/9/2013    S/p Lt Carpal tunnel release 11/2011     DJD (degenerative joint disease) of lumbar spine 2/9/2015    Essential hypertension 11/6/2012    Fatty liver 11/26/2012    2012: US of ABD showed: Severe hepatic steatosis and  Hepatomegaly.    Mixed hyperlipidemia 12/5/2014    Morbid obesity 4/9/2013    BMI= 41.2 on 04/09/2013     Type 2 diabetes mellitus with diabetic neuropathy, without long-term current use of insulin 12/5/2014       Past Surgical History:   Procedure Laterality Date    CARPAL TUNNEL RELEASE Left 11/2011    no relief of hand symptoms    CIRCUMCISION N/A 3/8/2024    Procedure: CIRCUMCISION;  Surgeon: Raúl Mckee Jr., MD;  Location: [facility] OR [address] [place] [address];  Service: Urology;  Laterality: N/A;  1 hr    COLONOSCOPY  2004    normal, repeat in 7 yrs    COLONOSCOPY N/A 3/27/2019    Procedure: COLONOSCOPY;  Surgeon: Smooth German MD;  Location: [facility] ([facility]  Service: Endoscopy;  Laterality: N/A;    INCISIONAL HERNIA REPAIR Left 12/2012    inguinal    SHOULDER SURGERY Left 1972       Family History   Problem Relation Age of Onset    Diabetes Mother     Diabetes Father     Heart failure Father     Cancer Daughter 14        Leukemia, passed    Cancer Other         niece, Lung cancer    Liver disease Neg Hx     Colon cancer Neg Hx        Social History     Socioeconomic History    Marital status:      Spouse name: Alicia    Number of children: 5   Tobacco Use    Smoking  status: Former     Current packs/day: 0.00     Types: Cigarettes     Quit date: 1/28/2009     Years since quitting: 15.2     Passive exposure: Past    Smokeless tobacco: Never   Substance and Sexual Activity    Alcohol use: No    Drug use: No    Sexual activity: Yes     Partners: Female   Social History Narrative    Retired . Now a .     to Alicia.  5 children.     Social Determinants of Health     Financial Resource Strain: Low Risk  (3/13/2024)    Overall Financial Resource Strain (CARDIA)     Difficulty of Paying Living Expenses: Not very hard   Food Insecurity: No Food Insecurity (3/13/2024)    Hunger Vital Sign     Worried About Running Out of Food in the Last Year: Never true     Ran Out of Food in the Last Year: Never true   Transportation Needs: No Transportation Needs (3/13/2024)    PRAPARE - Transportation     Lack of Transportation (Medical): No     Lack of Transportation (Non-Medical): No   Physical Activity: Sufficiently Active (3/13/2024)    Exercise Vital Sign     Days of Exercise per Week: 5 days     Minutes of Exercise per Session: 60 min   Stress: Stress Concern Present (3/13/2024)    [employer] - Occupational Stress Questionnaire     Feeling of Stress : To some extent   Social Connections: Unknown (3/13/2024)    Social Connection and Isolation Panel [NHANES]     Frequency of Communication with Friends and Family: More than three times a week     Frequency of Social Gatherings with Friends and Family: Once a week     Active Member of Clubs or Organizations: Yes     Attends Club or Organization Meetings: More than 4 times per year     Marital Status:    Housing Stability: Low Risk  (3/13/2024)    Housing Stability Vital Sign     Unable to Pay for Housing in the Last Year: No     Number of Places Lived in the Last Year: 1     Unstable Housing in the Last Year: No       Allergies:  No known drug  allergies    Medications:    Current Outpatient Medications:     amLODIPine (NORVASC) 10 MG tablet, Take 1 tablet (10 mg total) by mouth once daily., Disp: 90 tablet, Rfl: 3    aspirin (ECOTRIN) 81 MG EC tablet, Take 81 mg by mouth once daily., Disp: , Rfl:     betamethasone valerate 0.1% (VALISONE) 0.1 % Oint, Apply topically 2 (two) times daily., Disp: 30 g, Rfl: 3    blood sugar diagnostic Strp, 1 strip by Misc.(Non-Drug; Combo Route) route before meals and at bedtime as needed., Disp: 200 strip, Rfl: 4    blood-glucose meter kit, To check BG 3 times daily, to use with insurance preferred meter, Disp: 1 each, Rfl: 0    blood-glucose meter,continuous (DEXCOM G7 ) Misc, 1 Device by Misc.(Non-Drug; Combo Route) route continuous., Disp: 1 each, Rfl: 0    blood-glucose sensor (DEXCOM G7 SENSOR) [place], 1 each by Misc.(Non-Drug; Combo Route) route every 10 days., Disp: 3 each, Rfl: 11    empagliflozin (JARDIANCE) 10 mg tablet, Take 1 tablet (10 mg total) by mouth once daily. (Patient not taking: Reported on 1/10/2024), Disp: 30 tablet, Rfl: 2    fluticasone (FLONASE) 50 mcg/actuation nasal spray, 1 spray by Nasal route., Disp: , Rfl:     glipiZIDE (GLUCOTROL) 10 MG TR24, Take 1 tablet (10 mg total) by mouth daily with breakfast., Disp: 90 tablet, Rfl: 3    hydrOXYzine HCL (ATARAX) 25 MG tablet, Take 1 tablet (25 mg total) by mouth every evening. If too sedating okay to take 1/2 a pill, Disp: 30 tablet, Rfl: 2    insulin glargine, TOUJEO, (TOUJEO SOLOSTAR U-300 INSULIN) 300 unit/mL (1.5 mL) InPn pen, Inject 15 Units into the skin once daily., Disp: 1.5 mL, Rfl: 11    lancets (LANCETS,ULTRA THIN) Misc, 1 application by Misc.(Non-Drug; Combo Route) route before meals and at bedtime as needed. (Patient not taking: Reported on 1/10/2024), Disp: 200 each, Rfl: 4    losartan (COZAAR) 100 MG tablet, Take 1 tablet (100 mg total) by mouth once daily., Disp: 90 tablet, Rfl: 3    meloxicam (MOBIC) 15 MG  "tablet, Take 1 tablet (15 mg total) by mouth once daily., Disp: 30 tablet, Rfl: 0    metFORMIN (GLUCOPHAGE) 1000 MG tablet, Take 1 tablet (1,000 mg total) by mouth 2 (two) times daily with meals., Disp: 180 tablet, Rfl: 3    oxyCODONE (ROXICODONE) 5 MG immediate release tablet, Take 1 tablet (5 mg total) by mouth every 6 (six) hours as needed for Pain., Disp: 5 tablet, Rfl: 0    pen needle, diabetic (BD ULTRA-FINE KOBI PEN NEEDLE) 32 gauge x 5/32" Ndle, 1 each by Misc.(Non-Drug; Combo Route) route once daily., Disp: 100 each, Rfl: 3    rosuvastatin (CRESTOR) 10 MG tablet, Take 1 tablet (10 mg total) by mouth once daily., Disp: 90 tablet, Rfl: 3    spironolactone (ALDACTONE) 25 MG tablet, Take 1 tablet (25 mg total) by mouth once daily., Disp: 90 tablet, Rfl: 3    Review of Systems   Constitutional:  Negative for activity change, appetite change, chills, diaphoresis, fatigue, fever and unexpected weight change.   HENT:  Negative for congestion, dental problem, hearing loss, mouth sores, postnasal drip, rhinorrhea, sinus pressure and trouble swallowing.    Eyes:  Negative for pain, discharge and itching.   Respiratory:  Negative for apnea, cough, choking, chest tightness, shortness of breath and wheezing.    Cardiovascular:  Negative for chest pain, palpitations and leg swelling.   Gastrointestinal:  Negative for abdominal distention, abdominal pain, anal bleeding, blood in stool, constipation, diarrhea, nausea, rectal pain and vomiting.   Endocrine: Negative for polydipsia and polyuria.   Genitourinary:  Negative for decreased urine volume, difficulty urinating, dysuria, enuresis, flank pain, frequency, genital sores, hematuria, penile discharge, penile pain, penile swelling, scrotal swelling, testicular pain and urgency.   Musculoskeletal:  Negative for arthralgias, back pain and myalgias.   Skin:  Negative for color change, rash and wound.   Neurological:  Negative for dizziness, syncope, speech difficulty, " light-headedness and headaches.   Hematological:  Negative for adenopathy. Does not bruise/bleed easily.   Psychiatric/Behavioral:  Negative for behavioral problems, confusion, hallucinations and sleep disturbance.        Objective:      Physical Exam  Constitutional:       Appearance: He is well-developed.   HENT:      Head: Normocephalic.   Cardiovascular:      Rate and Rhythm: Normal rate.   Pulmonary:      Effort: Pulmonary effort is normal.   Abdominal:      Palpations: Abdomen is soft.   Genitourinary:     Penis: Normal.    Musculoskeletal:      Cervical back: Tenderness: healing well.   Skin:     General: Skin is warm.   Neurological:      Mental Status: He is alert.       Assessment:       1. Post-operative state        Plan:       Kilo was seen today for post-op evaluation.    Diagnoses and all orders for this visit:    Post-operative state

## 2024-04-19 ENCOUNTER — RESEARCH ENCOUNTER (OUTPATIENT)
Dept: RESEARCH | Facility: HOSPITAL | Age: 69
End: 2024-04-19
Payer: MEDICARE

## 2024-04-19 NOTE — PROGRESS NOTES
Visit Name: NA    Sponsor: [employer]    Study Title/IRB Number: [id number]    Principle Investigator: Dr Lamin Solano    Present for Discussion: Maeve Sharpe, ES      Spoke with Mr. Lam regarding patient potential eligibility for [employer] study. Patient not interested in above study.

## 2024-06-19 DIAGNOSIS — I10 ESSENTIAL HYPERTENSION: ICD-10-CM

## 2024-06-19 RX ORDER — AMLODIPINE BESYLATE 10 MG/1
10 TABLET ORAL DAILY
Qty: 90 TABLET | Refills: 3 | Status: SHIPPED | OUTPATIENT
Start: 2024-06-19

## 2024-06-19 NOTE — TELEPHONE ENCOUNTER
----- Message from Nadya Smyth sent at 6/19/2024  2:42 PM CDT -----  Type:  RX Refill Request    Who Called: pt  Refill or New Rx:refill  RX Name and Strength:amLODIPine (NORVASC) 10 MG tablet [[id number]]  How is the patient currently taking it? (ex. 1XDay):Take 1 tablet (10 mg total) by mouth once daily. - Oral  Is this a 30 day or 90 day RX:90  Preferred Pharmacy with phone number:[address] - [address]   Phone: [phone]  Fax: [phone]  Local or Mail Order:local  Ordering Provider:Horacio Valentin, DO  Would the patient rather a call back or a response via [facility]? call  Best Call Back Number:[phone]   Additional Information:   Pt states he has been out of medications for a week. Please call pt with [facility] info and assistance.Thank you.        Type: RX Refill Request      Who Called: pt  Refill or New Rx:refill  RX Name and Strength:empagliflozin (JARDIANCE) 10 mg tablet [[id number]]  How is the patient currently taking it? (ex. 1XDay): Take 1 tablet (10 mg total) by mouth once daily. - Oral    Is this a 30 day or 90 day RX:30  Preferred Pharmacy with phone number:[address] - [address]   Phone: [phone]  Fax: [phone]  Local or Mail Order:local  Ordering Provider:Horacio Valentin, DO  Would the patient rather a call back or a response via [facility]? call  Best Call Back Number:[phone]

## 2024-06-24 NOTE — TELEPHONE ENCOUNTER
----- Message from Ramya Greenwood sent at 6/24/2024 10:45 AM CDT -----  Contact: [phone]  Requesting an RX refill or new RX.    Is this a refill or new RX: Refill    RX name and strength (copy/paste from chart):  empagliflozin (JARDIANCE) 10 mg tablet    Is this a 30 day or 90 day RX:     Pharmacy name and phone # (copy/paste from chart):    [facility] [address] [place], [place] - [address]  [address] [facility] [address]  Phone: [phone] Fax: [phone]      The doctors have asked that we provide their patients with the following 2 reminders -- prescription refills can take up to 72 hours, and a friendly reminder that in the future you can use your [facility] account to request refills: Call back     Pt said he has been out for 2 weeks and needs his meds

## 2024-06-30 RX ORDER — MECLIZINE HYDROCHLORIDE 25 MG/1
25 TABLET ORAL 3 TIMES DAILY PRN
Qty: 30 TABLET | Refills: 0 | Status: SHIPPED | OUTPATIENT
Start: 2024-06-30 | End: 2024-07-15

## 2024-09-05 ENCOUNTER — OFFICE VISIT (OUTPATIENT)
Dept: URGENT CARE | Facility: CLINIC | Age: 69
End: 2024-09-05
Payer: MEDICARE

## 2024-09-05 VITALS
HEART RATE: 73 BPM | SYSTOLIC BLOOD PRESSURE: 178 MMHG | BODY MASS INDEX: 35.94 KG/M2 | OXYGEN SATURATION: 99 % | WEIGHT: 229 LBS | HEIGHT: 67 IN | RESPIRATION RATE: 20 BRPM | DIASTOLIC BLOOD PRESSURE: 80 MMHG | TEMPERATURE: 97 F

## 2024-09-05 DIAGNOSIS — U07.1 COVID-19 VIRUS DETECTED: ICD-10-CM

## 2024-09-05 DIAGNOSIS — U07.1 COVID-19: Primary | ICD-10-CM

## 2024-09-05 DIAGNOSIS — R05.9 COUGH, UNSPECIFIED TYPE: ICD-10-CM

## 2024-09-05 DIAGNOSIS — M94.0 ACUTE COSTOCHONDRITIS: ICD-10-CM

## 2024-09-05 LAB
CTP QC/QA: YES
SARS-COV-2 AG RESP QL IA.RAPID: POSITIVE

## 2024-09-05 PROCEDURE — 87811 SARS-COV-2 COVID19 W/OPTIC: CPT | Mod: QW,S$GLB,, | Performed by: NURSE PRACTITIONER

## 2024-09-05 PROCEDURE — 99214 OFFICE O/P EST MOD 30 MIN: CPT | Mod: S$GLB,,, | Performed by: NURSE PRACTITIONER

## 2024-09-05 RX ORDER — METHOCARBAMOL 500 MG/1
500 TABLET, FILM COATED ORAL 3 TIMES DAILY
Qty: 30 TABLET | Refills: 0 | Status: SHIPPED | OUTPATIENT
Start: 2024-09-05 | End: 2024-09-15

## 2024-09-05 RX ORDER — ACETAMINOPHEN 500 MG
1000 TABLET ORAL
Status: COMPLETED | OUTPATIENT
Start: 2024-09-05 | End: 2024-09-05

## 2024-09-05 RX ORDER — FLUTICASONE PROPIONATE 50 MCG
1 SPRAY, SUSPENSION (ML) NASAL DAILY
Qty: 9.9 ML | Refills: 0 | Status: SHIPPED | OUTPATIENT
Start: 2024-09-05 | End: 2024-09-12

## 2024-09-05 RX ORDER — BENZONATATE 100 MG/1
200 CAPSULE ORAL 3 TIMES DAILY PRN
Qty: 60 CAPSULE | Refills: 0 | Status: SHIPPED | OUTPATIENT
Start: 2024-09-05 | End: 2024-09-15

## 2024-09-05 RX ADMIN — Medication 1000 MG: at 02:09

## 2024-09-05 NOTE — PATIENT INSTRUCTIONS
"Discharge instructions for Covid 19, Cough and Acute Costochondritis  Stop Rosuvastatin (Crestor) for 10 days while taking Paxlovid    COVID-19    If you test positive for COVID-19 you may return to normal activities when both are true for at least 24 hours:    Your symptoms are improved overall, AND  You have not had a fever AND are not using fever-reducing medication.    The CDC also recommends added precautions in the 5 days after return to normal activity including wearing a mask, frequent hand washing, and physical distancing.     Should you develop a fever or start to feel worse after returning to normal activities, you should return home and stay away from others for at least another 24 hours.         - Drink plenty of fluids so you won't get dehydrated.      Avoid taking Decongestants such as pseudoephedrine (ex. Sudafed) or phenylephrine (ex. Mucinex FastMax, Dayquil, Nyquil, or any combo cold meds that say "cold," "sinus" or "-D").        - Cough recommendations:   Warm tea with honey can help with cough. Honey is a natural cough suppressant.  - Dextromethorphan (DM) is a cough suppressant over the counter (ie. mucinex DM OR delsym).        - Congestion recommendations:      - Mucinex (guaifenesin) twice a day (or as directed) to help loosen mucous.       - Fever/Pain recommendations:  Alternate Tylenol or Ibuprofen as directed for fever/pain.   - Motrin/Ibuprofen every 6-8 hours for pain and inflammation. Do not take ibuprofen if you have a history of GI bleeding, kidney disease, or if you take blood thinners.    - Tylenol/acetaminophen every 6-8 hours for added pain relief.  Avoid tylenol if you have a history of liver disease.       -Sore throat recommendations: Warm fluids, warm salt water gargles, throat lozenges, tea, honey, soup, or drinking something cold or frozen.  Throat lozenges or sprays help reduce pain. Gargling with warm saltwater (1/4 teaspoon of salt in 1/2 cup of warm water) or an OTC " anesthetic gargle may be useful for irritation.      - Go to the ER if you develop new or worsening symptoms.      When to seek medical advice  Call your healthcare provider right away if any of these occur:  Fever that is poorly controlled with OTC fever reducing medication  New or worsening ear pain, sinus pain, or headache  Stiff neck  You can't swallow liquids or you can't open your mouth wide because of throat pain  Signs of dehydration. These include very dark urine or no urine, sunken eyes, and dizziness.  Trouble breathing or noisy breathing  Muffled voice  Rash

## 2024-09-05 NOTE — PROGRESS NOTES
"Subjective:      Patient ID: Kilo Lam is a 68 y.o. male.    Vitals:  height is 5' 7" (1.702 m) and weight is 103.9 kg (229 lb). His oral temperature is 97.2 °F (36.2 °C). His blood pressure is 178/80 (abnormal) and his pulse is 73. His respiration is 20 and oxygen saturation is 99%.     Chief Complaint: Cough    Male 67 yo c/o postnasal drip, cough, and sore throat. Pt states from coughing his chest is hurting and trouble breathing. Pt states he has been having these symptoms since Sunday evening. Pt states he has a dry cough.     Cough  This is a new problem. The current episode started in the past 7 days. The problem has been gradually worsening. The problem occurs constantly. The cough is Non-productive. Associated symptoms include chest pain, nasal congestion, postnasal drip and a sore throat. Pertinent negatives include no shortness of breath or wheezing. He has tried OTC cough suppressant for the symptoms. The treatment provided no relief.       HENT:  Positive for postnasal drip and sore throat.    Cardiovascular:  Positive for chest pain.   Respiratory:  Positive for cough. Negative for shortness of breath and wheezing.       Objective:     Physical Exam   Constitutional: He is oriented to person, place, and time. He appears well-developed. He is cooperative.  Non-toxic appearance. He does not appear ill. No distress. awake  HENT:   Head: Normocephalic and atraumatic.   Ears:   Right Ear: Hearing, tympanic membrane, external ear and ear canal normal. no impacted cerumen  Left Ear: Hearing, tympanic membrane, external ear and ear canal normal. no impacted cerumen  Nose: Mucosal edema and congestion present. No rhinorrhea or nasal deformity. No epistaxis. Right sinus exhibits no maxillary sinus tenderness and no frontal sinus tenderness. Left sinus exhibits no maxillary sinus tenderness and no frontal sinus tenderness.   Mouth/Throat: Uvula is midline and mucous membranes are normal. No trismus in the " jaw. Normal dentition. No uvula swelling. Posterior oropharyngeal erythema present. No oropharyngeal exudate or posterior oropharyngeal edema.   Eyes: Conjunctivae and lids are normal. Pupils are equal, round, and reactive to light. No scleral icterus. Extraocular movement intact   Neck: Trachea normal and phonation normal. Neck supple. No thyromegaly present. No edema present. No erythema present. No neck rigidity present.   Cardiovascular: Normal rate, regular rhythm, S1 normal, S2 normal, normal heart sounds and normal pulses.   Pulmonary/Chest: Effort normal and breath sounds normal. No respiratory distress. He has no decreased breath sounds. He has no wheezes. He has no rhonchi. He has no rales.   Discomfort with coughing to sternum, reproducible. Negative shortness of breath, dry non productive cough.         Comments: Discomfort with coughing to sternum, reproducible. Negative shortness of breath, dry non productive cough.    Abdominal: Normal appearance and bowel sounds are normal. There is no abdominal tenderness.      Comments: Bilateral equal breath sounds, negative for wheezing, and rales, pusle sats 99%.   Musculoskeletal: Normal range of motion.         General: No deformity. Normal range of motion.      Right lower leg: No edema.      Left lower leg: No edema.   Lymphadenopathy:     He has no cervical adenopathy.   Neurological: no focal deficit. He is alert and oriented to person, place, and time. He displays no weakness. He exhibits normal muscle tone. Coordination and gait normal.   Skin: Skin is warm, dry, intact, not diaphoretic and not pale. Capillary refill takes less than 2 seconds.   Psychiatric: His speech is normal and behavior is normal. Mood, judgment and thought content normal.   Nursing note and vitals reviewed.    Results for orders placed or performed in visit on 09/05/24   SARS Coronavirus 2 Antigen, POCT Manual Read   Result Value Ref Range    SARS Coronavirus 2 Antigen Positive (A)  Negative     Acceptable Yes        Assessment:     1. COVID-19    2. Cough, unspecified type    3. Acute costochondritis        Plan:       COVID-19  -     nirmatrelvir-ritonavir 300 mg (150 mg x 2)-100 mg copackaged tablets (EUA); Take 3 tablets by mouth 2 (two) times daily for 5 days. Each dose contains 2 nirmatrelvir (pink tablets) and 1 ritonavir (white tablet). Take all 3 tablets together  Dispense: 30 tablet; Refill: 0  -     fluticasone propionate (FLONASE) 50 mcg/actuation nasal spray; 1 spray (50 mcg total) by Each Nostril route once daily. for 7 days  Dispense: 9.9 mL; Refill: 0    Cough, unspecified type  -     SARS Coronavirus 2 Antigen, POCT Manual Read  -     benzonatate (TESSALON) 100 MG capsule; Take 2 capsules (200 mg total) by mouth 3 (three) times daily as needed for Cough.  Dispense: 60 capsule; Refill: 0    Acute costochondritis  -     acetaminophen tablet 1,000 mg  -     methocarbamoL (ROBAXIN) 500 MG Tab; Take 1 tablet (500 mg total) by mouth 3 (three) times daily. for 10 days  Dispense: 30 tablet; Refill: 0      Patient Instructions   Discharge instructions for Covid 19, Cough and Acute Costochondritis  Stop Rosuvastatin (Crestor) for 10 days while taking Paxlovid    COVID-19    If you test positive for COVID-19 you may return to normal activities when both are true for at least 24 hours:    Your symptoms are improved overall, AND  You have not had a fever AND are not using fever-reducing medication.    The CDC also recommends added precautions in the 5 days after return to normal activity including wearing a mask, frequent hand washing, and physical distancing.     Should you develop a fever or start to feel worse after returning to normal activities, you should return home and stay away from others for at least another 24 hours.         - Drink plenty of fluids so you won't get dehydrated.      Avoid taking Decongestants such as pseudoephedrine (ex. Sudafed) or phenylephrine  "(ex. Mucinex FastMax, Dayquil, Nyquil, or any combo cold meds that say "cold," "sinus" or "-D").        - Cough recommendations:   Warm tea with honey can help with cough. Honey is a natural cough suppressant.  - Dextromethorphan (DM) is a cough suppressant over the counter (ie. mucinex DM OR delsym).        - Congestion recommendations:      - Mucinex (guaifenesin) twice a day (or as directed) to help loosen mucous.       - Fever/Pain recommendations:  Alternate Tylenol or Ibuprofen as directed for fever/pain.   - Motrin/Ibuprofen every 6-8 hours for pain and inflammation. Do not take ibuprofen if you have a history of GI bleeding, kidney disease, or if you take blood thinners.    - Tylenol/acetaminophen every 6-8 hours for added pain relief.  Avoid tylenol if you have a history of liver disease.       -Sore throat recommendations: Warm fluids, warm salt water gargles, throat lozenges, tea, honey, soup, or drinking something cold or frozen.  Throat lozenges or sprays help reduce pain. Gargling with warm saltwater (1/4 teaspoon of salt in 1/2 cup of warm water) or an OTC anesthetic gargle may be useful for irritation.      - Go to the ER if you develop new or worsening symptoms.      When to seek medical advice  Call your healthcare provider right away if any of these occur:  Fever that is poorly controlled with OTC fever reducing medication  New or worsening ear pain, sinus pain, or headache  Stiff neck  You can't swallow liquids or you can't open your mouth wide because of throat pain  Signs of dehydration. These include very dark urine or no urine, sunken eyes, and dizziness.  Trouble breathing or noisy breathing  Muffled voice  Rash                  "

## 2024-09-09 ENCOUNTER — TELEPHONE (OUTPATIENT)
Dept: URGENT CARE | Facility: CLINIC | Age: 69
End: 2024-09-09
Payer: MEDICARE

## 2024-09-09 NOTE — TELEPHONE ENCOUNTER
----- Message from Carmen Mcelroy sent at 9/9/2024  9:09 AM CDT -----  Contact: [phone] pt  [email or web address]     Patient is calling for Medical Advice regarding:Requesting an extended work excuse due to being covid positive, pt would like to return to work on 09/12/2024.    How long has patient had these symptoms:    Pharmacy name and phone#:    Patient wants a call back or thru [facility]:call back    Comments::Please call and advise    Please advise patient replies from provider may take up to 48 hours.   yes

## 2024-09-09 NOTE — TELEPHONE ENCOUNTER
callled pt @9:30 AM 09/09/24 and informed pt per provider pt has to come in for a visit to be reassessed in order to extend work excuse.

## 2025-05-13 ENCOUNTER — OFFICE VISIT (OUTPATIENT)
Dept: PRIMARY CARE CLINIC | Facility: CLINIC | Age: 70
End: 2025-05-13
Payer: MEDICARE

## 2025-05-13 VITALS
DIASTOLIC BLOOD PRESSURE: 67 MMHG | HEIGHT: 67 IN | BODY MASS INDEX: 35.61 KG/M2 | HEART RATE: 70 BPM | SYSTOLIC BLOOD PRESSURE: 171 MMHG | OXYGEN SATURATION: 98 % | WEIGHT: 226.88 LBS

## 2025-05-13 DIAGNOSIS — R06.00 DYSPNEA, UNSPECIFIED TYPE: ICD-10-CM

## 2025-05-13 DIAGNOSIS — E11.59 HYPERTENSION ASSOCIATED WITH TYPE 2 DIABETES MELLITUS: ICD-10-CM

## 2025-05-13 DIAGNOSIS — M54.50 CHRONIC MIDLINE LOW BACK PAIN, UNSPECIFIED WHETHER SCIATICA PRESENT: ICD-10-CM

## 2025-05-13 DIAGNOSIS — I15.2 HYPERTENSION ASSOCIATED WITH TYPE 2 DIABETES MELLITUS: ICD-10-CM

## 2025-05-13 DIAGNOSIS — M48.9 CERVICAL SPINE DISEASE: ICD-10-CM

## 2025-05-13 DIAGNOSIS — G89.29 CHRONIC MIDLINE LOW BACK PAIN, UNSPECIFIED WHETHER SCIATICA PRESENT: ICD-10-CM

## 2025-05-13 DIAGNOSIS — E66.812 CLASS 2 SEVERE OBESITY WITH SERIOUS COMORBIDITY AND BODY MASS INDEX (BMI) OF 35.0 TO 35.9 IN ADULT, UNSPECIFIED OBESITY TYPE: ICD-10-CM

## 2025-05-13 DIAGNOSIS — E66.01 CLASS 2 SEVERE OBESITY WITH SERIOUS COMORBIDITY AND BODY MASS INDEX (BMI) OF 35.0 TO 35.9 IN ADULT, UNSPECIFIED OBESITY TYPE: ICD-10-CM

## 2025-05-13 DIAGNOSIS — G62.9 NEUROPATHY: Primary | ICD-10-CM

## 2025-05-13 PROCEDURE — 99999 PR PBB SHADOW E&M-EST. PATIENT-LVL V: CPT | Mod: PBBFAC,,, | Performed by: FAMILY MEDICINE

## 2025-05-13 PROCEDURE — 3078F DIAST BP <80 MM HG: CPT | Mod: CPTII,S$GLB,, | Performed by: FAMILY MEDICINE

## 2025-05-13 PROCEDURE — 3077F SYST BP >= 140 MM HG: CPT | Mod: CPTII,S$GLB,, | Performed by: FAMILY MEDICINE

## 2025-05-13 PROCEDURE — 1101F PT FALLS ASSESS-DOCD LE1/YR: CPT | Mod: CPTII,S$GLB,, | Performed by: FAMILY MEDICINE

## 2025-05-13 PROCEDURE — 4010F ACE/ARB THERAPY RXD/TAKEN: CPT | Mod: CPTII,S$GLB,, | Performed by: FAMILY MEDICINE

## 2025-05-13 PROCEDURE — 1159F MED LIST DOCD IN RCRD: CPT | Mod: CPTII,S$GLB,, | Performed by: FAMILY MEDICINE

## 2025-05-13 PROCEDURE — 3288F FALL RISK ASSESSMENT DOCD: CPT | Mod: CPTII,S$GLB,, | Performed by: FAMILY MEDICINE

## 2025-05-13 PROCEDURE — 99214 OFFICE O/P EST MOD 30 MIN: CPT | Mod: S$GLB,,, | Performed by: FAMILY MEDICINE

## 2025-05-13 PROCEDURE — 3008F BODY MASS INDEX DOCD: CPT | Mod: CPTII,S$GLB,, | Performed by: FAMILY MEDICINE

## 2025-05-13 PROCEDURE — 1157F ADVNC CARE PLAN IN RCRD: CPT | Mod: CPTII,S$GLB,, | Performed by: FAMILY MEDICINE

## 2025-05-13 PROCEDURE — 1125F AMNT PAIN NOTED PAIN PRSNT: CPT | Mod: CPTII,S$GLB,, | Performed by: FAMILY MEDICINE

## 2025-05-13 RX ORDER — MELOXICAM 15 MG/1
15 TABLET ORAL
COMMUNITY
Start: 2025-04-07

## 2025-05-13 RX ORDER — CETIRIZINE HYDROCHLORIDE 10 MG/1
10 TABLET ORAL
COMMUNITY

## 2025-05-13 RX ORDER — ESCITALOPRAM OXALATE 10 MG/1
10 TABLET ORAL
COMMUNITY
Start: 2024-11-11 | End: 2025-11-06

## 2025-05-13 RX ORDER — PREGABALIN 25 MG/1
25 CAPSULE ORAL 2 TIMES DAILY
Qty: 60 CAPSULE | Refills: 1 | Status: SHIPPED | OUTPATIENT
Start: 2025-05-13

## 2025-05-13 RX ORDER — METFORMIN HYDROCHLORIDE 1000 MG/1
1000 TABLET ORAL 2 TIMES DAILY WITH MEALS
COMMUNITY
Start: 2024-11-11

## 2025-05-13 RX ORDER — TRIAMTERENE AND HYDROCHLOROTHIAZIDE 37.5; 25 MG/1; MG/1
1 CAPSULE ORAL
COMMUNITY

## 2025-05-13 RX ORDER — AMLODIPINE BESYLATE 10 MG/1
10 TABLET ORAL
COMMUNITY
Start: 2024-11-11 | End: 2025-05-14

## 2025-05-13 RX ORDER — ACETAMINOPHEN 500 MG
1000 TABLET ORAL EVERY 8 HOURS PRN
COMMUNITY
Start: 2025-04-26

## 2025-05-13 RX ORDER — ALBUTEROL SULFATE 90 UG/1
2 INHALANT RESPIRATORY (INHALATION) EVERY 6 HOURS PRN
COMMUNITY

## 2025-05-13 RX ORDER — LISINOPRIL 40 MG/1
40 TABLET ORAL
COMMUNITY
Start: 2024-11-11 | End: 2025-05-14

## 2025-05-13 NOTE — PATIENT INSTRUCTIONS
Blood pressure medications:  Amlodipine 10mg  Losartan 100mg  Triamterene / Hydrochlorothiazie: 37.5mg/25mg  Stop taking the Spironolactone 25mg    Buy blood pressure machine.    Check blood pressures at home until seen by Dr. Merlos.   Goal <130/80  >140/90, will need to make changes to blood pressure medications          [facility]  [place] Office  [address], [address]  [place] [address]  Phone: [phone]

## 2025-05-13 NOTE — PROGRESS NOTES
Clinic Note  5/14/2025      Subjective:       Patient ID:  Kilo is a 69 y.o. male being seen for:      History of Present Illness    CHIEF COMPLAINT:  Kilo presents today for referral to physical therapy for back pain    MUSCULOSKELETAL PAIN:  He reports chronic back and neck pain ongoing for several years with sciatica. Previous physical therapy included massage therapy, heat therapy, and therapeutic exercises. He continues meloxicam with limited effectiveness. Previous trial of Gabapentin was unsuccessful despite dose escalation. He reports worsening hand pain over the last few months with development of bumps and nodules, suggesting possible arthritis. His hands are particularly painful today and showing signs of deformity. He also experiences foot pain which he attributes to arthritis, age, and diabetic neuropathy.    POST-COVID SYMPTOMS:  He has contracted COVID 3 times in the past year with persistent breathing difficulties since the most recent infection. He reports decreased exercise tolerance with reduction in daily steps from 7,000-8,000 to 3,000-4,000. He visited the emergency room two weeks ago due to not feeling well, his first ER visit. He has an unused Albuterol inhaler prescribed during the hospital visit.    CURRENT MEDICATIONS:  He takes metformin, Motropin, and meloxicam in the morning. For blood pressure management, he takes amlodipine 10mg, losartan 100mg, triamterene-hydrochlorothiazide, and spironolactone.      ROS:  General: -fever, -chills, -fatigue, -weight gain, -weight loss  Eyes: -vision changes, -redness, -discharge  ENT: -ear pain, -nasal congestion, -sore throat  Cardiovascular: -chest pain, -palpitations, -lower extremity edema  Respiratory: -cough, +shortness of breath, +exertional dyspnea  Gastrointestinal: -abdominal pain, -nausea, -vomiting, -diarrhea, -constipation, -blood in stool  Genitourinary: -dysuria, -hematuria, -frequency  Musculoskeletal: -joint pain, -muscle  pain, +neck pain, +back pain, +limb pain, +bone deformity, +lumps/masses  Skin: -rash, -lesion  Neurological: -headache, -dizziness, -numbness, -tingling, +nerve pain  Psychiatric: -anxiety, -depression, -sleep difficulty           Medication List with Changes/Refills   New Medications    PREGABALIN (LYRICA) 25 MG CAPSULE    Take 1 capsule (25 mg total) by mouth 2 (two) times daily. Nerve pain / pain   Current Medications    ACETAMINOPHEN (TYLENOL) 500 MG TABLET    Take 1,000 mg by mouth every 8 (eight) hours as needed.    ALBUTEROL (PROVENTIL/VENTOLIN HFA) 90 MCG/ACTUATION INHALER    2 puffs every 6 (six) hours as needed.    AMLODIPINE (NORVASC) 10 MG TABLET    Take 1 tablet (10 mg total) by mouth once daily.    ASPIRIN (ECOTRIN) 81 MG EC TABLET    Take 81 mg by mouth once daily.    BETAMETHASONE VALERATE 0.1% (VALISONE) 0.1 % OINT    Apply topically 2 (two) times daily.    BLOOD SUGAR DIAGNOSTIC STRP    1 strip by Misc.(Non-Drug; Combo Route) route before meals and at bedtime as needed.    BLOOD-GLUCOSE METER KIT    To check BG 3 times daily, to use with insurance preferred meter    BLOOD-GLUCOSE METER,CONTINUOUS (DEXCOM G7 ) MISC    1 Device by Misc.(Non-Drug; Combo Route) route continuous.    BLOOD-GLUCOSE SENSOR (DEXCOM G7 SENSOR) BRIGIDA    1 each by Misc.(Non-Drug; Combo Route) route every 10 days.    CETIRIZINE (ZYRTEC) 10 MG TABLET    Take 10 mg by mouth.    EMPAGLIFLOZIN (JARDIANCE) 10 MG TABLET    Take 1 tablet (10 mg total) by mouth once daily.    ESCITALOPRAM OXALATE (LEXAPRO) 10 MG TABLET    Take 10 mg by mouth.    FLUTICASONE (FLONASE) 50 MCG/ACTUATION NASAL SPRAY    1 spray by Nasal route.    GLIPIZIDE (GLUCOTROL) 10 MG TR24    Take 1 tablet (10 mg total) by mouth daily with breakfast.    HYDROXYZINE HCL (ATARAX) 25 MG TABLET    Take 1 tablet (25 mg total) by mouth every evening. If too sedating okay to take 1/2 a pill    LANCETS (LANCETS,ULTRA THIN) MISC    1 application by Misc.(Non-Drug;  "Combo Route) route before meals and at bedtime as needed.    LOSARTAN (COZAAR) 100 MG TABLET    Take 1 tablet (100 mg total) by mouth once daily.    MECLIZINE (ANTIVERT) 25 MG TABLET    Take 1 tablet (25 mg total) by mouth 3 (three) times daily as needed for Dizziness or Nausea.    MELOXICAM (MOBIC) 15 MG TABLET    Take 1 tablet (15 mg total) by mouth once daily.    MELOXICAM (MOBIC) 15 MG TABLET    Take 15 mg by mouth.    METFORMIN (GLUCOPHAGE) 1000 MG TABLET    Take 1,000 mg by mouth 2 (two) times daily with meals.    OXYCODONE (ROXICODONE) 5 MG IMMEDIATE RELEASE TABLET    Take 1 tablet (5 mg total) by mouth every 6 (six) hours as needed for Pain.    PEN NEEDLE, DIABETIC (BD ULTRA-FINE KOBI PEN NEEDLE) 32 GAUGE X 5/32" NDLE    1 each by Misc.(Non-Drug; Combo Route) route once daily.    ROSUVASTATIN (CRESTOR) 10 MG TABLET    Take 1 tablet (10 mg total) by mouth once daily.    TRIAMTERENE-HYDROCHLOROTHIAZIDE 37.5-25 MG (DYAZIDE) 37.5-25 MG PER CAPSULE    Take 1 capsule by mouth.   Discontinued Medications    AMLODIPINE (NORVASC) 10 MG TABLET    Take 10 mg by mouth.    LISINOPRIL (PRINIVIL,ZESTRIL) 40 MG TABLET    Take 40 mg by mouth.    METFORMIN (GLUCOPHAGE) 1000 MG TABLET    Take 1 tablet (1,000 mg total) by mouth 2 (two) times daily with meals.    SPIRONOLACTONE (ALDACTONE) 25 MG TABLET    Take 1 tablet (25 mg total) by mouth once daily.       Problem List[1]        Objective:      BP (!) 171/67   Pulse 70   Ht 5' 7" (1.702 m)   Wt 102.9 kg (226 lb 13.7 oz)   SpO2 98%   BMI 35.53 kg/m²       Physical Exam    General: No acute distress. Well-developed. Well-nourished.  Eyes: EOMI. Sclerae anicteric.  HENT: Normocephalic. Atraumatic. Nares patent. Moist oral mucosa.  Cardiovascular: Regular rate. Regular rhythm. No murmurs. No rubs. No gallops. Normal S1, S2.  Respiratory: Normal respiratory effort. Clear to auscultation bilaterally. No rales. No rhonchi. No wheezing.  Musculoskeletal: No  obvious " deformity.  Extremities: No lower extremity edema.  Neurological: Alert & oriented x3. No slurred speech. Normal gait.  Psychiatric: Normal mood. Normal affect. Good insight. Good judgment.  Skin: Warm. Dry. No rash.           Assessment and Plan:     Assessment & Plan    - Chronic pain issues, including back pain, neck pain, and hand pain, potentially related to arthritis and nerve compression.  - Started conservative management through physical therapy and medication.  - Identified and discontinued spironolactone due to contraindicated combination with triamterene in current BP medication regimen.  - Recent chest XR results normal, ruling out obvious cardiopulmonary issues.  - Considered possibility of post-COVID respiratory symptoms and their impact on exercise tolerance, but deferred further testing or treatment to upcoming appointment with Dr. Warner.  - Explained white coat syndrome as a possible cause for elevated BP readings in clinic.    POLYNEUROPATHY IN DISEASES CLASSIFIED ELSEWHERE:  - Kilo reports sciatica, neck pain, and hand pain with deformity, bumps, and nodules indicating arthritis.  - Hands are particularly severe today, with feet also hurting.  - Evaluated chronic pain likely due to arthritis and diabetes affecting nerves, contributing to pain in feet, legs, and back.  - Prescribed Lyrica 25 mg twice daily for chronic pain and nerve pain, with room for dose adjustment due to patient's age.    TYPE 2 DIABETES MELLITUS WITH OTHER CIRCULATORY COMPLICATIONS:  - Kilo has diabetes which is contributing to nerve pain and chronic pain issues.  - Has been dealing with hypertension since age 21 and is on multiple antihypertensive medications.  - Blood pressure is elevated in clinic but may be normal at home, possibly due to white coat syndrome.  - Advised patient to check blood pressure at home and bring readings to next appointment.  - Instructed to discontinue spironolactone due to duplication  with triamterene while continuing other antihypertensive medications.    LOW BACK PAIN:  - Kilo reports ongoing low back pain for a few years, currently severe and affecting daily activities.  - Ordered lumbar spine radiograph.  - Previous treatments included massages, heat pads, and exercises.  - Referred to [facility] for management.    CERVICAL PAIN (NECK PAIN):  - Kilo reports neck pain contributing to nerve issues affecting hands and feet.  - Ordered cervical spine radiograph.  - Referred to [facility] for management.    SCIATICA:  - Referred to [facility] for management.    OSTEOARTHRITIS OF HANDS:  - Kilo presents with hand deformity and nodules indicating arthritis, with severe pain affecting daily activities.  - Referred to [facility] for arthritis management.    OSTEOARTHRITIS OF FEET:  - Kilo reports foot pain with severe pain affecting daily activities.  - Referred to [facility] for arthritis management.    POST-COVID CONDITION:  - Kilo reports decreased exercise tolerance post-COVID (now only 3,000-4,000 steps compared to previous 7,000-8,000) and shortness of breath possibly due to asthma-like reaction.  - patient already has albuterol inhaler at home, advised trial of albuterol inhaler for dyspnea and instructed to review proper inhaler technique on YouTube before use.  - Considering starting daily inhaler pending further evaluation.    HYPERTENSION:  - Explained target BP goals: less than 130/80, with consistent readings above 140/90 requiring medication adjustment.  - Advised home BP monitoring with documentation until appointment with Dr. Merlos.  - Continued medications: amlodipine 10 mg, losartan 100 mg, triamterene 37.5 mg, hydrochloroquine 25 mg.  Discontinue spironolactone due to duplication of medication class with triamterene    CHRONIC PAIN MANAGEMENT:  - Will re-evaluate with radiographs of neck and back to  assess arthritis severity.    FOLLOW-UP:  - Follow up with Dr. Merlos in 2 weeks for complete workup.  - Kilo to bring recorded BP readings to next appointment.         Follow Up:   No follow-ups on file.    Other Orders Placed This Visit:  Orders Placed This Encounter    X-Ray Lumbar Complete Including Flex And Ext    X-Ray Cervical Spine Complete 5 view    Ambulatory Referral/Consult to Physical Therapy    pregabalin (LYRICA) 25 MG capsule         Ravi Patel MD        This note is dictated on M*Modal word recognition program and This note was generated with the assistance of ambient listening technology. Verbal consent was obtained by the patient and accompanying visitor(s) for the recording of patient appointment to facilitate this note. I attest to having reviewed and edited the generated note for accuracy, though some syntax or spelling errors may persist. Please contact the author of this note for any clarification.  .  There are word recognition mistakes that are occasionally missed on review.         [1]   Patient Active Problem List  Diagnosis    Hypertension associated with type 2 diabetes mellitus    NAFLD (nonalcoholic fatty liver disease)    Class 2 severe obesity with serious comorbidity and body mass index (BMI) of 35.0 to 35.9 in adult    Mixed hyperlipidemia    Type 2 diabetes mellitus with diabetic neuropathy, without long-term current use of insulin    Anxiety and depression    Microcytic anemia    Tinea cruris    BPH associated with nocturia    History of inguinal hernia repair    Vitamin D deficiency    Hyperlipidemia associated with type 2 diabetes mellitus    Neuropathy associated with cancer    Major depressive disorder, recurrent, mild    Type 2 diabetes mellitus with diabetic peripheral angiopathy with gangrene, unspecified whether long term insulin use

## 2025-05-14 PROBLEM — G63 NEUROPATHY ASSOCIATED WITH CANCER: Status: ACTIVE | Noted: 2021-10-22

## 2025-05-14 PROBLEM — C80.1 NEUROPATHY ASSOCIATED WITH CANCER: Status: ACTIVE | Noted: 2021-10-22

## 2025-05-28 ENCOUNTER — OFFICE VISIT (OUTPATIENT)
Facility: CLINIC | Age: 70
End: 2025-05-28
Payer: MEDICARE

## 2025-05-28 VITALS
SYSTOLIC BLOOD PRESSURE: 188 MMHG | BODY MASS INDEX: 36.06 KG/M2 | WEIGHT: 229.75 LBS | HEIGHT: 67 IN | DIASTOLIC BLOOD PRESSURE: 73 MMHG | HEART RATE: 66 BPM | TEMPERATURE: 98 F | OXYGEN SATURATION: 99 %

## 2025-05-28 DIAGNOSIS — M05.79 RHEUMATOID ARTHRITIS INVOLVING MULTIPLE SITES WITH POSITIVE RHEUMATOID FACTOR: ICD-10-CM

## 2025-05-28 DIAGNOSIS — G89.29 CHRONIC MIDLINE LOW BACK PAIN, UNSPECIFIED WHETHER SCIATICA PRESENT: ICD-10-CM

## 2025-05-28 DIAGNOSIS — R06.09 DOE (DYSPNEA ON EXERTION): ICD-10-CM

## 2025-05-28 DIAGNOSIS — E11.40 TYPE 2 DIABETES MELLITUS WITH DIABETIC NEUROPATHY, WITHOUT LONG-TERM CURRENT USE OF INSULIN: ICD-10-CM

## 2025-05-28 DIAGNOSIS — F33.0 MAJOR DEPRESSIVE DISORDER, RECURRENT, MILD: ICD-10-CM

## 2025-05-28 DIAGNOSIS — K76.0 NAFLD (NONALCOHOLIC FATTY LIVER DISEASE): ICD-10-CM

## 2025-05-28 DIAGNOSIS — E11.69 HYPERLIPIDEMIA ASSOCIATED WITH TYPE 2 DIABETES MELLITUS: ICD-10-CM

## 2025-05-28 DIAGNOSIS — E66.01 CLASS 2 SEVERE OBESITY DUE TO EXCESS CALORIES WITH SERIOUS COMORBIDITY AND BODY MASS INDEX (BMI) OF 35.0 TO 35.9 IN ADULT: ICD-10-CM

## 2025-05-28 DIAGNOSIS — M54.50 CHRONIC MIDLINE LOW BACK PAIN, UNSPECIFIED WHETHER SCIATICA PRESENT: ICD-10-CM

## 2025-05-28 DIAGNOSIS — E11.59 HYPERTENSION ASSOCIATED WITH TYPE 2 DIABETES MELLITUS: ICD-10-CM

## 2025-05-28 DIAGNOSIS — M48.9 CERVICAL SPINE DISEASE: ICD-10-CM

## 2025-05-28 DIAGNOSIS — E78.5 HYPERLIPIDEMIA ASSOCIATED WITH TYPE 2 DIABETES MELLITUS: ICD-10-CM

## 2025-05-28 DIAGNOSIS — I15.2 HYPERTENSION ASSOCIATED WITH TYPE 2 DIABETES MELLITUS: ICD-10-CM

## 2025-05-28 DIAGNOSIS — M19.049 OSTEOARTHRITIS OF FINGER, UNSPECIFIED LATERALITY: Primary | ICD-10-CM

## 2025-05-28 DIAGNOSIS — E66.812 CLASS 2 SEVERE OBESITY DUE TO EXCESS CALORIES WITH SERIOUS COMORBIDITY AND BODY MASS INDEX (BMI) OF 35.0 TO 35.9 IN ADULT: ICD-10-CM

## 2025-05-28 DIAGNOSIS — M54.12 CERVICAL RADICULOPATHY: ICD-10-CM

## 2025-05-28 DIAGNOSIS — D50.9 MICROCYTIC ANEMIA: ICD-10-CM

## 2025-05-28 PROCEDURE — 3288F FALL RISK ASSESSMENT DOCD: CPT | Mod: CPTII,S$GLB,, | Performed by: HOSPITALIST

## 2025-05-28 PROCEDURE — 1159F MED LIST DOCD IN RCRD: CPT | Mod: CPTII,S$GLB,, | Performed by: HOSPITALIST

## 2025-05-28 PROCEDURE — 3077F SYST BP >= 140 MM HG: CPT | Mod: CPTII,S$GLB,, | Performed by: HOSPITALIST

## 2025-05-28 PROCEDURE — 1125F AMNT PAIN NOTED PAIN PRSNT: CPT | Mod: CPTII,S$GLB,, | Performed by: HOSPITALIST

## 2025-05-28 PROCEDURE — 3078F DIAST BP <80 MM HG: CPT | Mod: CPTII,S$GLB,, | Performed by: HOSPITALIST

## 2025-05-28 PROCEDURE — 1101F PT FALLS ASSESS-DOCD LE1/YR: CPT | Mod: CPTII,S$GLB,, | Performed by: HOSPITALIST

## 2025-05-28 PROCEDURE — G2211 COMPLEX E/M VISIT ADD ON: HCPCS | Mod: S$GLB,,, | Performed by: HOSPITALIST

## 2025-05-28 PROCEDURE — 99999 PR PBB SHADOW E&M-EST. PATIENT-LVL V: CPT | Mod: PBBFAC,,, | Performed by: HOSPITALIST

## 2025-05-28 PROCEDURE — 4010F ACE/ARB THERAPY RXD/TAKEN: CPT | Mod: CPTII,S$GLB,, | Performed by: HOSPITALIST

## 2025-05-28 PROCEDURE — 99215 OFFICE O/P EST HI 40 MIN: CPT | Mod: S$GLB,,, | Performed by: HOSPITALIST

## 2025-05-28 PROCEDURE — 1157F ADVNC CARE PLAN IN RCRD: CPT | Mod: CPTII,S$GLB,, | Performed by: HOSPITALIST

## 2025-05-28 PROCEDURE — 3008F BODY MASS INDEX DOCD: CPT | Mod: CPTII,S$GLB,, | Performed by: HOSPITALIST

## 2025-05-28 RX ORDER — PREGABALIN 25 MG/1
50 CAPSULE ORAL 2 TIMES DAILY
Qty: 120 CAPSULE | Refills: 11 | Status: SHIPPED | OUTPATIENT
Start: 2025-05-28

## 2025-05-28 RX ORDER — CELECOXIB 100 MG/1
100 CAPSULE ORAL 2 TIMES DAILY
Qty: 60 CAPSULE | Refills: 11 | Status: SHIPPED | OUTPATIENT
Start: 2025-05-28

## 2025-06-01 PROBLEM — R06.09 DOE (DYSPNEA ON EXERTION): Status: ACTIVE | Noted: 2025-06-01

## 2025-06-01 PROBLEM — G89.29 CHRONIC MIDLINE LOW BACK PAIN: Status: ACTIVE | Noted: 2025-06-01

## 2025-06-01 PROBLEM — M05.79 RHEUMATOID ARTHRITIS INVOLVING MULTIPLE SITES WITH POSITIVE RHEUMATOID FACTOR: Status: ACTIVE | Noted: 2025-06-01

## 2025-06-01 PROBLEM — M54.12 CERVICAL RADICULOPATHY: Status: ACTIVE | Noted: 2025-06-01

## 2025-06-01 PROBLEM — G62.9 NEUROPATHY: Status: ACTIVE | Noted: 2025-06-01

## 2025-06-01 PROBLEM — M54.50 CHRONIC MIDLINE LOW BACK PAIN: Status: ACTIVE | Noted: 2025-06-01

## 2025-06-01 PROBLEM — M19.049 OSTEOARTHRITIS OF FINGER: Status: ACTIVE | Noted: 2025-06-01

## 2025-06-04 ENCOUNTER — HOSPITAL ENCOUNTER (OUTPATIENT)
Dept: RADIOLOGY | Facility: OTHER | Age: 70
Discharge: HOME | End: 2025-06-04
Attending: FAMILY MEDICINE
Payer: MEDICARE

## 2025-06-04 ENCOUNTER — HOSPITAL ENCOUNTER (OUTPATIENT)
Dept: CARDIOLOGY | Facility: OTHER | Age: 70
Discharge: HOME | End: 2025-06-04
Attending: HOSPITALIST
Payer: MEDICARE

## 2025-06-04 DIAGNOSIS — R06.09 DOE (DYSPNEA ON EXERTION): ICD-10-CM

## 2025-06-04 DIAGNOSIS — M54.50 CHRONIC MIDLINE LOW BACK PAIN, UNSPECIFIED WHETHER SCIATICA PRESENT: ICD-10-CM

## 2025-06-04 DIAGNOSIS — M48.9 CERVICAL SPINE DISEASE: ICD-10-CM

## 2025-06-04 DIAGNOSIS — G89.29 CHRONIC MIDLINE LOW BACK PAIN, UNSPECIFIED WHETHER SCIATICA PRESENT: ICD-10-CM

## 2025-06-04 PROCEDURE — 72050 X-RAY EXAM NECK SPINE 4/5VWS: CPT | Mod: 26,,, | Performed by: RADIOLOGY

## 2025-06-04 PROCEDURE — 72114 X-RAY EXAM L-S SPINE BENDING: CPT | Mod: 26,,, | Performed by: RADIOLOGY

## 2025-06-04 PROCEDURE — 72050 X-RAY EXAM NECK SPINE 4/5VWS: CPT | Mod: TC,FY

## 2025-06-04 PROCEDURE — 72114 X-RAY EXAM L-S SPINE BENDING: CPT | Mod: TC,FY

## 2025-06-18 ENCOUNTER — RESULTS FOLLOW-UP (OUTPATIENT)
Dept: PRIMARY CARE CLINIC | Facility: CLINIC | Age: 70
End: 2025-06-18

## 2025-06-26 ENCOUNTER — OFFICE VISIT (OUTPATIENT)
Facility: CLINIC | Age: 70
End: 2025-06-26
Payer: MEDICARE

## 2025-06-26 VITALS
BODY MASS INDEX: 35.38 KG/M2 | TEMPERATURE: 98 F | HEART RATE: 75 BPM | HEIGHT: 67 IN | DIASTOLIC BLOOD PRESSURE: 71 MMHG | SYSTOLIC BLOOD PRESSURE: 172 MMHG | WEIGHT: 225.44 LBS | OXYGEN SATURATION: 99 %

## 2025-06-26 DIAGNOSIS — M54.12 CERVICAL RADICULOPATHY: ICD-10-CM

## 2025-06-26 DIAGNOSIS — I15.2 HYPERTENSION ASSOCIATED WITH TYPE 2 DIABETES MELLITUS: Primary | ICD-10-CM

## 2025-06-26 DIAGNOSIS — M54.50 CHRONIC MIDLINE LOW BACK PAIN, UNSPECIFIED WHETHER SCIATICA PRESENT: ICD-10-CM

## 2025-06-26 DIAGNOSIS — E11.69 HYPERLIPIDEMIA ASSOCIATED WITH TYPE 2 DIABETES MELLITUS: ICD-10-CM

## 2025-06-26 DIAGNOSIS — E78.5 HYPERLIPIDEMIA ASSOCIATED WITH TYPE 2 DIABETES MELLITUS: ICD-10-CM

## 2025-06-26 DIAGNOSIS — E11.40 TYPE 2 DIABETES MELLITUS WITH DIABETIC NEUROPATHY, WITHOUT LONG-TERM CURRENT USE OF INSULIN: ICD-10-CM

## 2025-06-26 DIAGNOSIS — E11.59 HYPERTENSION ASSOCIATED WITH TYPE 2 DIABETES MELLITUS: Primary | ICD-10-CM

## 2025-06-26 DIAGNOSIS — G89.29 CHRONIC MIDLINE LOW BACK PAIN, UNSPECIFIED WHETHER SCIATICA PRESENT: ICD-10-CM

## 2025-06-26 DIAGNOSIS — R06.09 DOE (DYSPNEA ON EXERTION): ICD-10-CM

## 2025-06-26 DIAGNOSIS — M05.79 RHEUMATOID ARTHRITIS INVOLVING MULTIPLE SITES WITH POSITIVE RHEUMATOID FACTOR: ICD-10-CM

## 2025-06-26 DIAGNOSIS — M48.9 CERVICAL SPINE DISEASE: ICD-10-CM

## 2025-06-26 DIAGNOSIS — M19.049 OSTEOARTHRITIS OF FINGER, UNSPECIFIED LATERALITY: ICD-10-CM

## 2025-06-26 PROCEDURE — 1125F AMNT PAIN NOTED PAIN PRSNT: CPT | Mod: CPTII,S$GLB,, | Performed by: HOSPITALIST

## 2025-06-26 PROCEDURE — 4010F ACE/ARB THERAPY RXD/TAKEN: CPT | Mod: CPTII,S$GLB,, | Performed by: HOSPITALIST

## 2025-06-26 PROCEDURE — 3077F SYST BP >= 140 MM HG: CPT | Mod: CPTII,S$GLB,, | Performed by: HOSPITALIST

## 2025-06-26 PROCEDURE — 3288F FALL RISK ASSESSMENT DOCD: CPT | Mod: CPTII,S$GLB,, | Performed by: HOSPITALIST

## 2025-06-26 PROCEDURE — 3078F DIAST BP <80 MM HG: CPT | Mod: CPTII,S$GLB,, | Performed by: HOSPITALIST

## 2025-06-26 PROCEDURE — 99999 PR PBB SHADOW E&M-EST. PATIENT-LVL IV: CPT | Mod: PBBFAC,,, | Performed by: HOSPITALIST

## 2025-06-26 PROCEDURE — 1159F MED LIST DOCD IN RCRD: CPT | Mod: CPTII,S$GLB,, | Performed by: HOSPITALIST

## 2025-06-26 PROCEDURE — 1101F PT FALLS ASSESS-DOCD LE1/YR: CPT | Mod: CPTII,S$GLB,, | Performed by: HOSPITALIST

## 2025-06-26 PROCEDURE — 3008F BODY MASS INDEX DOCD: CPT | Mod: CPTII,S$GLB,, | Performed by: HOSPITALIST

## 2025-06-26 PROCEDURE — 1157F ADVNC CARE PLAN IN RCRD: CPT | Mod: CPTII,S$GLB,, | Performed by: HOSPITALIST

## 2025-06-26 PROCEDURE — 99215 OFFICE O/P EST HI 40 MIN: CPT | Mod: S$GLB,,, | Performed by: HOSPITALIST

## 2025-06-26 PROCEDURE — G2211 COMPLEX E/M VISIT ADD ON: HCPCS | Mod: S$GLB,,, | Performed by: HOSPITALIST

## 2025-06-26 RX ORDER — OLMESARTAN MEDOXOMIL 20 MG/1
20 TABLET ORAL DAILY
Qty: 30 TABLET | Refills: 11 | Status: SHIPPED | OUTPATIENT
Start: 2025-06-26 | End: 2026-06-26

## 2025-06-26 RX ORDER — CELECOXIB 200 MG/1
200 CAPSULE ORAL 2 TIMES DAILY
Qty: 180 CAPSULE | Refills: 3 | Status: SHIPPED | OUTPATIENT
Start: 2025-06-26

## 2025-06-26 RX ORDER — NIFEDIPINE 30 MG/1
30 TABLET, EXTENDED RELEASE ORAL DAILY
Qty: 30 TABLET | Refills: 11 | Status: SHIPPED | OUTPATIENT
Start: 2025-06-26 | End: 2026-06-26

## 2025-06-26 RX ORDER — PREGABALIN 25 MG/1
75 CAPSULE ORAL 2 TIMES DAILY
Qty: 180 CAPSULE | Refills: 11 | Status: SHIPPED | OUTPATIENT
Start: 2025-06-26

## 2025-06-26 NOTE — ASSESSMENT & PLAN NOTE
W/ known h/o systolic murmur and valvular disease seen on prior echo; will get repeat echo to eval for progression of valve disease or change in systolic or diastolic function.  Due to scheduling snafu, he was scheduled at 2 different facilities unable to do contrast echo; rescheduled.
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
## 2025-06-26 NOTE — ASSESSMENT & PLAN NOTE
Has been poorly controlled for years; currently on metformin 1000mg BID and glipizide 10mg.  Hasn't been on Jardiance for >1y, and was on Ozempic for about 3 mos at the 0.5mg dose prior to shortages preventing access to the drug.  Due to update A1c and urine protein screen; based on results will determine how to tailor management.

## 2025-06-26 NOTE — ASSESSMENT & PLAN NOTE
Participating in PT and some improvement in sx with medication adjustments.  Will increase pregabalin to 75mg BID.

## 2025-06-26 NOTE — ASSESSMENT & PLAN NOTE
Improving with OT and medication adjustments.  Will increase celebrex to 200mg BID and pregabalin to 75mg BID.

## 2025-06-26 NOTE — ASSESSMENT & PLAN NOTE
Pt's home BP readings being similar to office readings suggests poor control with current regimen which is likely also being exacerbated by pain.  Will change amlodipine 10mg to nifedipine 30mg and losartan 100mg to olmesartan 20mg.

## 2025-06-26 NOTE — ASSESSMENT & PLAN NOTE
Discussed with pt how this never was completely evaluated b/c if RA is active it may be contributing to his pain and decreased mobility of hands.  He has had elevated RF and inflammatory markers in the past with normal CCP Abs.  Will recheck these and based on results will try to get back in with Rheumatology to complete evaluation and start tx if indicated, which should lead to significant sx improvement and retard joint destruction.

## 2025-07-21 ENCOUNTER — OCHSNER VIRTUAL EMERGENCY DEPARTMENT (OUTPATIENT)
Facility: CLINIC | Age: 70
End: 2025-07-21
Payer: MEDICARE

## 2025-07-21 ENCOUNTER — NURSE TRIAGE (OUTPATIENT)
Dept: ADMINISTRATIVE | Facility: CLINIC | Age: 70
End: 2025-07-21
Payer: MEDICARE

## 2025-07-21 ENCOUNTER — PATIENT OUTREACH (OUTPATIENT)
Facility: OTHER | Age: 70
End: 2025-07-21
Payer: MEDICARE

## 2025-07-21 NOTE — PLAN OF CARE-OVED
[facility] Emergency Department Plan of Care Note  Referral Source: Nurse On-Call                               Chief Complaint   Patient presents with    Medication Problem     Patient reports increase in saliva after having some medications changed last month. Denies facial droop, denies water spilling out of his mouth when trying to drink something. No slurred speech.        Recommendation: Primary Care    Offered same day appointment which he declined, scheduled in clinic for tomorrow.                         No diagnosis found.        Isela Abraham MD   10:47 AM

## 2025-07-21 NOTE — TELEPHONE ENCOUNTER
Pt states that he believes he is having a reaction to medication change. C/o having drooling. Denies difficulty swallowing and denies difficulty breathing. Pt also c/o sinus congestion. C/o discomfort in left ear. Advise to be seen today per protocol. Pt states that he is unable to be seen today and requesting appt for tomorrow. Secure chat sent to Alan Dr. Isela Abraham MD per protocol. Pt confirmed No facial droop or slurred speech. Pt states that he is able to drink but has increase in saliva. Dr. Abraham advised okay to be seen tomorrow. Appt scheduled per protocol. VU. Encounter routed to provider.         Reason for Disposition   Earache    Additional Information   Negative: Sounds like a life-threatening emergency to the triager   Negative: Difficulty breathing, and not from stuffy nose (e.g., not relieved by cleaning out the nose)   Negative: SEVERE headache and has fever   Negative: Patient sounds very sick or weak to the triager   Negative: SEVERE sinus pain (e.g., excruciating)   Negative: Severe headache   Negative: Redness or swelling on the cheek, forehead, or around the eye   Negative: Fever > 103 F (39.4 C)   Negative: Fever > 101 F (38.3 C) and over 60 years of age   Negative: Fever > 100 F (37.8 C) and has diabetes mellitus or a weak immune system (e.g., HIV positive, cancer chemotherapy, organ transplant, splenectomy, chronic steroids)   Negative: Fever > 100 F (37.8 C) and bedridden (e.g., CVA, chronic illness, recovering from surgery)   Negative: Fever present > 3 days (72 hours)   Negative: Fever returns after gone for over 24 hours and symptoms worse or not improved   Negative: Sinus pain (not just congestion) and fever    Protocols used: Sinus Pain or Congestion-A-[place]

## 2025-07-21 NOTE — PROGRESS NOTES
"Patient spoke to [facility] RN on call nurse to report the following, "Pt states that he believes he is having a reaction to medication change. C/o he is having drooling. Denies difficulty swallowing and difficulty breathing. Pt also c/o sinus congestion. C/o discomfort in left ear. Advise to be seen today per protocol. Pt states that he is unable to be seen today and requesting appt for tomorrow."    [facility] RN on call nurse consulted with AlanKAJAL LIANG on call, Dr. Isela Abraham, and disposition is primary care. Appointment scheduled on 7/22/25 per patient request. Follow up scheduled on 07/23/2025 to outreach to assess for any additional needs/concerns.     "

## 2025-07-26 ENCOUNTER — PATIENT MESSAGE (OUTPATIENT)
Dept: ADMINISTRATIVE | Facility: HOSPITAL | Age: 70
End: 2025-07-26
Payer: MEDICARE

## 2025-07-29 ENCOUNTER — TELEPHONE (OUTPATIENT)
Dept: PRIMARY CARE CLINIC | Facility: CLINIC | Age: 70
End: 2025-07-29
Payer: MEDICARE

## 2025-07-29 NOTE — TELEPHONE ENCOUNTER
Copied from CRM #[id number]. Topic: Medications - Medication Authorization  >> Jul 29, 2025  3:34 PM Katelyn wrote:  Type: Rx Clarification/ Additional Information/ Questions    Medication: pregabalin (LYRICA) 25 MG capsule    What questions do you have about the medication, if any? Calling to speak with the nurse regarding additional questions for the prior authorization.     What information is needed?    Pharmacy number:    Pharmacy Contact Name and phone number:     Comments:ZEYAD with [facility], [phone] Ref# [id number]

## 2025-08-12 ENCOUNTER — TELEPHONE (OUTPATIENT)
Dept: PRIMARY CARE CLINIC | Facility: CLINIC | Age: 70
End: 2025-08-12
Payer: MEDICARE

## 2025-08-12 ENCOUNTER — OFFICE VISIT (OUTPATIENT)
Dept: PRIMARY CARE CLINIC | Facility: CLINIC | Age: 70
End: 2025-08-12
Payer: MEDICARE

## 2025-08-12 VITALS
SYSTOLIC BLOOD PRESSURE: 187 MMHG | DIASTOLIC BLOOD PRESSURE: 78 MMHG | HEIGHT: 67 IN | HEART RATE: 80 BPM | WEIGHT: 225.63 LBS | RESPIRATION RATE: 16 BRPM | BODY MASS INDEX: 35.41 KG/M2 | TEMPERATURE: 98 F | OXYGEN SATURATION: 100 %

## 2025-08-12 DIAGNOSIS — B02.9 ACUTE PAIN ASSOCIATED WITH HERPES ZOSTER: ICD-10-CM

## 2025-08-12 DIAGNOSIS — I15.2 HYPERTENSION ASSOCIATED WITH TYPE 2 DIABETES MELLITUS: ICD-10-CM

## 2025-08-12 DIAGNOSIS — B02.9 HERPES ZOSTER WITHOUT COMPLICATION: Primary | ICD-10-CM

## 2025-08-12 DIAGNOSIS — E11.59 HYPERTENSION ASSOCIATED WITH TYPE 2 DIABETES MELLITUS: ICD-10-CM

## 2025-08-12 PROCEDURE — 99999 PR PBB SHADOW E&M-EST. PATIENT-LVL V: CPT | Mod: PBBFAC,,,

## 2025-08-12 PROCEDURE — 99214 OFFICE O/P EST MOD 30 MIN: CPT | Mod: S$GLB,,,

## 2025-08-12 PROCEDURE — 4010F ACE/ARB THERAPY RXD/TAKEN: CPT | Mod: CPTII,S$GLB,,

## 2025-08-12 PROCEDURE — 1101F PT FALLS ASSESS-DOCD LE1/YR: CPT | Mod: CPTII,S$GLB,,

## 2025-08-12 PROCEDURE — 1125F AMNT PAIN NOTED PAIN PRSNT: CPT | Mod: CPTII,S$GLB,,

## 2025-08-12 PROCEDURE — 1157F ADVNC CARE PLAN IN RCRD: CPT | Mod: CPTII,S$GLB,,

## 2025-08-12 PROCEDURE — 3288F FALL RISK ASSESSMENT DOCD: CPT | Mod: CPTII,S$GLB,,

## 2025-08-12 PROCEDURE — 3077F SYST BP >= 140 MM HG: CPT | Mod: CPTII,S$GLB,,

## 2025-08-12 PROCEDURE — 3078F DIAST BP <80 MM HG: CPT | Mod: CPTII,S$GLB,,

## 2025-08-12 PROCEDURE — 3008F BODY MASS INDEX DOCD: CPT | Mod: CPTII,S$GLB,,

## 2025-08-12 RX ORDER — ACYCLOVIR 400 MG/1
400 TABLET ORAL EVERY 12 HOURS
Qty: 14 TABLET | Refills: 0 | Status: SHIPPED | OUTPATIENT
Start: 2025-08-12 | End: 2025-08-19

## 2025-08-12 RX ORDER — MELOXICAM 15 MG/1
15 TABLET ORAL
COMMUNITY
Start: 2025-07-05

## 2025-08-15 ENCOUNTER — TELEPHONE (OUTPATIENT)
Dept: PRIMARY CARE CLINIC | Facility: CLINIC | Age: 70
End: 2025-08-15
Payer: MEDICARE

## 2025-08-20 ENCOUNTER — PATIENT MESSAGE (OUTPATIENT)
Dept: PRIMARY CARE CLINIC | Facility: CLINIC | Age: 70
End: 2025-08-20
Payer: MEDICARE

## 2025-08-22 ENCOUNTER — CLINICAL SUPPORT (OUTPATIENT)
Dept: PRIMARY CARE CLINIC | Facility: CLINIC | Age: 70
End: 2025-08-22
Payer: MEDICARE

## 2025-08-22 DIAGNOSIS — E11.40 TYPE 2 DIABETES MELLITUS WITH DIABETIC NEUROPATHY, WITHOUT LONG-TERM CURRENT USE OF INSULIN: ICD-10-CM

## 2025-08-29 ENCOUNTER — TELEPHONE (OUTPATIENT)
Dept: OPTOMETRY | Facility: CLINIC | Age: 70
End: 2025-08-29
Payer: MEDICARE

## 2025-08-29 ENCOUNTER — TELEPHONE (OUTPATIENT)
Dept: OPHTHALMOLOGY | Facility: CLINIC | Age: 70
End: 2025-08-29
Payer: MEDICARE

## 2025-09-02 ENCOUNTER — TELEPHONE (OUTPATIENT)
Dept: PRIMARY CARE CLINIC | Facility: CLINIC | Age: 70
End: 2025-09-02
Payer: MEDICARE

## (undated) DEVICE — CONTAINER SPECIMEN OR STER 4OZ

## (undated) DEVICE — ELECTRODE REM PLYHSV RETURN 9

## (undated) DEVICE — TRAY MINOR GEN SURG OMC

## (undated) DEVICE — BNDG COFLEX FOAM LF2 ST 4X5YD

## (undated) DEVICE — DRAPE LAP T SHT W/ INSTR PAD

## (undated) DEVICE — SUT CHROMIC 3-0 SH 27IN GUT

## (undated) DEVICE — NDL HYPO REG 25G X 1 1/2